# Patient Record
Sex: MALE | Race: BLACK OR AFRICAN AMERICAN | Employment: UNEMPLOYED | URBAN - METROPOLITAN AREA
[De-identification: names, ages, dates, MRNs, and addresses within clinical notes are randomized per-mention and may not be internally consistent; named-entity substitution may affect disease eponyms.]

---

## 2017-01-31 ENCOUNTER — TELEPHONE (OUTPATIENT)
Dept: FAMILY MEDICINE CLINIC | Age: 27
End: 2017-01-31

## 2017-01-31 NOTE — TELEPHONE ENCOUNTER
----- Message from [employer] sent at 1/31/2017  2:25 PM EST -----  Regarding: FAM Thacker/telephone  Pt's caregiver, Pinky Villar from [address] [place] [address] [phone].  Would like to know if Mr Marci Kwon and and his other client  patient  Mr Danae Bullard who is scheduled for his CPE on 2/20/17 can come in together for their CPE, like they have done in the past

## 2017-02-22 ENCOUNTER — OFFICE VISIT (OUTPATIENT)
Dept: FAMILY MEDICINE CLINIC | Age: 27
End: 2017-02-22

## 2017-02-22 VITALS
RESPIRATION RATE: 12 BRPM | WEIGHT: 286 LBS | HEIGHT: 71 IN | BODY MASS INDEX: 40.04 KG/M2 | TEMPERATURE: 97.6 F | HEART RATE: 54 BPM | DIASTOLIC BLOOD PRESSURE: 56 MMHG | SYSTOLIC BLOOD PRESSURE: 97 MMHG

## 2017-02-22 DIAGNOSIS — Z11.1 SCREENING-PULMONARY TB: ICD-10-CM

## 2017-02-22 DIAGNOSIS — E78.5 HYPERLIPIDEMIA, UNSPECIFIED HYPERLIPIDEMIA TYPE: ICD-10-CM

## 2017-02-22 DIAGNOSIS — E66.9 OBESITY (BMI 30-39.9): ICD-10-CM

## 2017-02-22 DIAGNOSIS — R73.02 IGT (IMPAIRED GLUCOSE TOLERANCE): ICD-10-CM

## 2017-02-22 DIAGNOSIS — I95.2 HYPOTENSION DUE TO DRUGS: ICD-10-CM

## 2017-02-22 DIAGNOSIS — Z00.00 ROUTINE GENERAL MEDICAL EXAMINATION AT A HEALTH CARE FACILITY: Primary | ICD-10-CM

## 2017-02-22 LAB — HBA1C MFR BLD HPLC: 5.2 %

## 2017-02-22 NOTE — PATIENT INSTRUCTIONS

## 2017-02-22 NOTE — PROGRESS NOTES
1. Have you been to the ER, urgent care clinic since your last visit? Hospitalized since your last visit? No    2. Have you seen or consulted any other health care providers outside of the [address] [place] [address] since your last visit? Include any pap smears or colon screening.  No     Chief Complaint   Patient presents with    Complete Physical

## 2017-02-22 NOTE — MR AVS SNAPSHOT
Visit Information Date & Time Provider Department Dept. Phone Encounter #  
 2/22/2017  2:00 PM Karen Lewis  [facility] [phone] [id number] Follow-up Instructions Return in about 6 months (around 8/22/2017) for routine labs. Upcoming Health Maintenance Date Due DTaP/Tdap/Td series (2 - Td) 9/3/2023 Allergies as of 2/22/2017  Review Complete On: 2/22/2017 By: Karen Lewis NP No Known Allergies Current Immunizations  Reviewed on 9/24/2014 Name Date Influenza Vaccine 12/3/2013 Influenza Vaccine (Quad) PF 10/7/2016 Influenza Vaccine Split 10/16/2012 TB Skin Test (PPD) Intradermal  Incomplete, 12/11/2015, 9/22/2014 Tdap 9/3/2013 Not reviewed this visit You Were Diagnosed With   
  
 Codes Comments Routine general medical examination at a health care facility    -  Primary ICD-10-CM: Z00.00 ICD-9-CM: V70.0 IGT (impaired glucose tolerance)     ICD-10-CM: R73.02 
ICD-9-CM: 790.22 Obesity (BMI 30-39. 9)     ICD-10-CM: E66.9 ICD-9-CM: 278.00 Hyperlipidemia, unspecified hyperlipidemia type     ICD-10-CM: E78.5 ICD-9-CM: 272.4 Screening-pulmonary TB     ICD-10-CM: Z11.1 ICD-9-CM: V74.1 Vitals BP  
  
  
  
  
  
 97/56 (BP 1 Location: Right arm, BP Patient Position: Sitting) Vitals History BMI and BSA Data Body Mass Index Body Surface Area  
 39.89 kg/m 2 2.55 m 2 [facility] [phone] Your Updated Medication List  
  
   
This list is accurate as of: 2/22/17  2:26 PM.  Always use your most recent med list.  
  
  
  
  
 aspirin delayed-release 81 mg tablet TAKE ONE TABLET BY MOUTH DAILY. cloNIDine HCl 0.2 mg tablet Commonly known as:  CATAPRES  
TAKE ONE TABLET BY MOUTH TWICE DAILY. divalproex  mg tablet Commonly known as:  DEPAKOTE Take  by mouth three (3) times daily. glucose blood VI test strips strip Commonly known as:  ACCU-CHEK ACTIVE TEST For glucose testing once daily. Dx: E11.9  
  
 hydrOXYzine pamoate 50 mg capsule Commonly known as:  VISTARIL Take 25 mg by mouth three (3) times daily. lancets 30 gauge Misc Commonly known as:  EASY TOUCH TWIST LANCETS For glucose testing once daily. Dx: E11.9 LORazepam 1 mg tablet Commonly known as:  ATIVAN Take 1 mg by mouth three (3) times daily. metFORMIN 500 mg tablet Commonly known as:  GLUCOPHAGE Take 1 Tab by mouth daily (with breakfast). RisperDAL 4 mg tablet Generic drug:  risperiDONE Take  by mouth. simvastatin 20 mg tablet Commonly known as:  ZOCOR  
TAKE 1 TABLET EACH EVENING FOR CHOLESTEROL. topiramate 100 mg tablet Commonly known as:  TOPAMAX Take  by mouth two (2) times a day. traZODone 100 mg tablet Commonly known as:  Cornelious Haymaker Take 100 mg by mouth nightly. ziprasidone 80 mg capsule Commonly known as:  Link Heir Take 80 mg by mouth two (2) times daily (with meals). We Performed the Following AMB POC HEMOGLOBIN A1C [46353 CPT(R)] AMB POC TUBERCULOSIS, INTRADERMAL (SKIN TEST) [35498 CPT(R)] Follow-up Instructions Return in about 6 months (around 8/22/2017) for routine labs. Patient Instructions Well Visit, Ages 25 to 48: Care Instructions Your Care Instructions Physical exams can help you stay healthy. Your doctor has checked your overall health and may have suggested ways to take good care of yourself. He or she also may have recommended tests. At home, you can help prevent illness with healthy eating, regular exercise, and other steps. Follow-up care is a key part of your treatment and safety.  Be sure to make and go to all appointments, and call your doctor if you are having problems. It's also a good idea to know your test results and keep a list of the medicines you take. How can you care for yourself at home? · Reach and stay at a healthy weight. This will lower your risk for many problems, such as obesity, diabetes, heart disease, and high blood pressure. · Get at least 30 minutes of physical activity on most days of the week. Walking is a good choice. You also may want to do other activities, such as running, swimming, cycling, or playing tennis or team sports. Discuss any changes in your exercise program with your doctor. · Do not smoke or allow others to smoke around you. If you need help quitting, talk to your doctor about stop-smoking programs and medicines. These can increase your chances of quitting for good. · Talk to your doctor about whether you have any risk factors for sexually transmitted infections (STIs). Having one sex partner (who does not have STIs and does not have sex with anyone else) is a good way to avoid these infections. · Use birth control if you do not want to have children at this time. Talk with your doctor about the choices available and what might be best for you. · Protect your skin from too much sun. When you're outdoors from 10 a.m. to 4 p.m., stay in the shade or cover up with clothing and a hat with a wide brim. Wear sunglasses that block UV rays. Even when it's cloudy, put broad-spectrum sunscreen (SPF 30 or higher) on any exposed skin. · See a dentist one or two times a year for checkups and to have your teeth cleaned. · Wear a seat belt in the car. · Drink alcohol in moderation, if at all. That means no more than 2 drinks a day for men and 1 drink a day for women. Follow your doctor's advice about when to have certain tests. These tests can spot problems early. For everyone · Cholesterol. Have the fat (cholesterol) in your blood tested after age 21.  Your doctor will tell you how often to have this done based on your age, family history, or other things that can increase your risk for heart disease. · Blood pressure. Have your blood pressure checked during a routine doctor visit. Your doctor will tell you how often to check your blood pressure based on your age, your blood pressure results, and other factors. · Vision. Talk with your doctor about how often to have a glaucoma test. 
· Diabetes. Ask your doctor whether you should have tests for diabetes. · Colon cancer. Have a test for colon cancer at age 48. You may have one of several tests. If you are younger than 48, you may need a test earlier if you have any risk factors. Risk factors include whether you already had a precancerous polyp removed from your colon or whether your parent, brother, sister, or child has had colon cancer. For women · Breast exam and mammogram. Talk to your doctor about when you should have a clinical breast exam and a mammogram. Medical experts differ on whether and how often women under 50 should have these tests. Your doctor can help you decide what is right for you. · Pap test and pelvic exam. Begin Pap tests at age 24. A Pap test is the best way to find cervical cancer. The test often is part of a pelvic exam. Ask how often to have this test. 
· Tests for sexually transmitted infections (STIs). Ask whether you should have tests for STIs. You may be at risk if you have sex with more than one person, especially if your partners do not wear condoms. For men · Tests for sexually transmitted infections (STIs). Ask whether you should have tests for STIs. You may be at risk if you have sex with more than one person, especially if you do not wear a condom. · Testicular cancer exam. Ask your doctor whether you should check your testicles regularly. · Prostate exam. Talk to your doctor about whether you should have a blood test (called a PSA test) for prostate cancer.  Experts differ on whether and when men should have this test. Some experts suggest it if you are older than 39 and are -American or have a father or brother who got prostate cancer when he was younger than 72. When should you call for help? Watch closely for changes in your health, and be sure to contact your doctor if you have any problems or symptoms that concern you. Where can you learn more? Go to http://virginia-rylan.info/. Enter P072 in the search box to learn more about \"Well Visit, Ages 25 to 48: Care Instructions. \" Current as of: July 19, 2016 Content Version: 11.1 © 6302-7459 [employer]. Care instructions adapted under license by [employer] (which disclaims liability or warranty for this information). If you have questions about a medical condition or this instruction, always ask your healthcare professional. [address] any warranty or liability for your use of this information. Introducing [facility]! [employer] introduces MoboTap patient portal. Now you can access parts of your medical record, email your doctor's office, and request medication refills online. 1. In your internet browser, go to [email or web address] 2. Click on the First Time User? Click Here link in the Sign In box. You will see the New Member Sign Up page. 3. Enter your MoboTap Access Code exactly as it appears below. You will not need to use this code after youve completed the sign-up process. If you do not sign up before the expiration date, you must request a new code. · MoboTap Access Code: [email or web address] Expires: 5/23/2017  2:26 PM 
 
4. Enter the last four digits of your Social Security Number (xxxx) and Date of Birth (mm/dd/yyyy) as indicated and click Submit. You will be taken to the next sign-up page. 5. Create a MoboTap ID.  This will be your MoboTap login ID and cannot be changed, so think of one that is secure and easy to remember. 6. Create a [employer] password. You can change your password at any time. 7. Enter your Password Reset Question and Answer. This can be used at a later time if you forget your password. 8. Enter your e-mail address. You will receive e-mail notification when new information is available in [address] 9. Click Sign Up. You can now view and download portions of your medical record. 10. Click the Download Summary menu link to download a portable copy of your medical information. If you have questions, please visit the Frequently Asked Questions section of the [employer] website. Remember, [employer] is NOT to be used for urgent needs. For medical emergencies, dial 911. Now available from your iPhone and Android! Please provide this summary of care documentation to your next provider. Your primary care clinician is listed as Gavino Andrade. If you have any questions after today's visit, please call [phone].

## 2017-02-22 NOTE — PROGRESS NOTES
HISTORY OF PRESENT ILLNESS  Margaux Ojeda is a 31 y.o. male. HPI  Cardiovascular Review:  The patient has pre-diabetes and hypertension. Diet and Lifestyle: generally follows a low fat low cholesterol diet, generally follows a low sodium diet, exercises sporadically, nonsmoker  Home BP Monitoring: is not measured at home. Pertinent ROS: taking medications as instructed, no medication side effects noted, no TIA's, no chest pain on exertion, no dyspnea on exertion, no swelling of ankles. Review of records indicates patient has lost 60 lbs over last 3 years. Diabetes Mellitus:  He has pre-diabetes. Patient taking Metformin BID. Diabetic ROS - medication compliance: compliant all of the time, diabetic diet compliance: noncompliant some of the time, home glucose monitoring: is performed sporadically, nonfasting values range . Psychiatry Review:  Patient is treated for bipolar disorder, MRAlberta Current treatment includes Geodon, Topamax, Ativan, Trazodone, Hydroxyzine, Clonidine and Depakote. Patient seen routinely by psychiatry, Dr Adriano Godoy. Ongoing symptoms include: none. Patient denies: psychomotor agitation, feelings of losing control, behavior disturbances. Reported side effects from the treatment: weight gain, fatigue. Current Outpatient Prescriptions   Medication Sig Dispense Refill    aspirin delayed-release 81 mg tablet TAKE ONE TABLET BY MOUTH DAILY. 30 Tab PRN    cloNIDine HCl (CATAPRES) 0.2 mg tablet TAKE ONE TABLET BY MOUTH TWICE DAILY. 60 Tab 5    simvastatin (ZOCOR) 20 mg tablet TAKE 1 TABLET EACH EVENING FOR CHOLESTEROL. 30 Tab 11    divalproex DR (DEPAKOTE) 500 mg tablet Take  by mouth three (3) times daily.  traZODone (DESYREL) 100 mg tablet Take 100 mg by mouth nightly.  lancets (EASY TOUCH TWIST LANCETS) 30 gauge misc For glucose testing once daily.  Dx: E11.9 100 Package 3    glucose blood VI test strips (ACCU-CHEK ACTIVE TEST) strip For glucose testing once daily. Dx: E11.9 50 Strip 11    risperiDONE (RISPERDAL) 4 mg tablet Take  by mouth.  LORazepam (ATIVAN) 1 mg tablet Take 1 mg by mouth three (3) times daily.  hydrOXYzine (VISTARIL) 50 mg capsule Take 25 mg by mouth three (3) times daily.  topiramate (TOPAMAX) 100 mg tablet Take  by mouth two (2) times a day.  ziprasidone (GEODON) 80 mg capsule Take 80 mg by mouth two (2) times daily (with meals). Past Medical History:   Diagnosis Date    Hyperlipidemia     Mental retardation     Obesity (BMI 30-39. 9)     Type II or unspecified type diabetes mellitus without mention of complication, not stated as uncontrolled       Lab Results   Component Value Date/Time    Hemoglobin A1c 5.5 10/07/2016 11:06 AM    Hemoglobin A1c 5.6 06/10/2016 11:23 AM    Hemoglobin A1c 5.4 12/11/2015 10:53 AM    Glucose 82 10/07/2016 11:06 AM    Microalb/Creat ratio (ug/mg creat.) 1.3 06/03/2013 11:48 AM    LDL, calculated 64 06/10/2016 11:23 AM    Creatinine 0.86 10/07/2016 11:06 AM      Lab Results   Component Value Date/Time    Cholesterol, total 119 06/10/2016 11:23 AM    HDL Cholesterol 42 06/10/2016 11:23 AM    LDL, calculated 64 06/10/2016 11:23 AM    Triglyceride 64 06/10/2016 11:23 AM      Review of Systems   Unable to perform ROS: mental acuity       Physical Exam   Constitutional: He appears well-developed and well-nourished. No distress. HENT:   Right Ear: Tympanic membrane and ear canal normal.   Left Ear: Tympanic membrane and ear canal normal.   Nose: No mucosal edema. Right sinus exhibits no maxillary sinus tenderness and no frontal sinus tenderness. Left sinus exhibits no maxillary sinus tenderness and no frontal sinus tenderness. Mouth/Throat: Oropharynx is clear and moist.   Eyes: Conjunctivae and EOM are normal. Pupils are equal, round, and reactive to light. Neck: Normal range of motion. Neck supple. No JVD present. Carotid bruit is not present. No thyromegaly present. Cardiovascular: Normal rate, regular rhythm, normal heart sounds and intact distal pulses. Exam reveals no gallop and no friction rub. No murmur heard. Pulmonary/Chest: Effort normal and breath sounds normal. No respiratory distress. He has no decreased breath sounds. He has no wheezes. He has no rhonchi. Abdominal: Normal appearance and bowel sounds are normal. There is no tenderness. There is no rigidity and no guarding. Hernia confirmed negative in the right inguinal area and confirmed negative in the left inguinal area. Genitourinary: Testes normal and penis normal.   Musculoskeletal: He exhibits no edema. Lymphadenopathy:     He has no cervical adenopathy. Neurological: He is alert. He has normal strength. No cranial nerve deficit. Gait normal.   Skin: No rash noted. Psychiatric: He has a normal mood and affect. His behavior is normal.   Nursing note and vitals reviewed. ASSESSMENT and PLAN  Luis was seen today for complete physical.    Diagnoses and all orders for this visit:    Routine general medical examination at a health care facility    IGT (impaired glucose tolerance)  AMB POC HEMOGLOBIN A1C: 5.2%. Stop Metformin. Will monitor. Hypotension due to drugs  Advised caretaker to discuss Clonidine with psychiatry. Obesity (BMI 30-39. 9)  Discussed need for weight loss through diet and exercise. Reviewed decreased caloric intake and increased activity. Hyperlipidemia, unspecified hyperlipidemia type  Total and LDL cholesterol to goal, HDL >40. No medication changes. Screening-pulmonary TB  -     AMB POC TUBERCULOSIS, INTRADERMAL (SKIN TEST)        I have discussed the diagnosis with the patient and the intended plan as seen in the above orders. The patient has received an after-visit summary along with patient information handout. I have discussed medication side effects and warnings with the patient as well.     Follow-up Disposition:  Return in about 6 months (around 8/22/2017) for routine labs.

## 2017-02-24 LAB
MM INDURATION POC: 0 MM (ref 0–5)
PPD POC: NEGATIVE NEGATIVE

## 2017-02-27 RX ORDER — CLONIDINE HYDROCHLORIDE 0.2 MG/1
TABLET ORAL
Qty: 60 TAB | Refills: 5 | OUTPATIENT
Start: 2017-02-27

## 2017-03-21 RX ORDER — SIMVASTATIN 20 MG/1
TABLET, FILM COATED ORAL
Qty: 30 TAB | Refills: 5 | Status: SHIPPED | OUTPATIENT
Start: 2017-03-21 | End: 2017-11-14 | Stop reason: SDUPTHER

## 2017-03-30 NOTE — TELEPHONE ENCOUNTER
Abby Sawant called and wanted to confirm how often [facility] be [facility] stopped. I informed him that per the order it says once daily but that I would confirm that with FAM Thacker and call back. He voiced understanding.

## 2017-03-31 DIAGNOSIS — E11.9 DIABETES MELLITUS TYPE II, CONTROLLED (HCC): ICD-10-CM

## 2017-04-02 RX ORDER — CALCIUM CITRATE/VITAMIN D3 200MG-6.25
TABLET ORAL
Qty: 50 STRIP | Refills: 5 | Status: ON HOLD | OUTPATIENT
Start: 2017-04-02 | End: 2020-09-04

## 2017-04-03 NOTE — TELEPHONE ENCOUNTER
[id number] [facility] notified patient need to check blood sugar 3 times per week     Per Wanita Leventhal needs new rx for lancets and test strips with 3 times a week

## 2017-04-05 RX ORDER — BLOOD-GLUCOSE CONTROL, NORMAL
EACH MISCELLANEOUS
Qty: 100 PACKAGE | Refills: 3 | Status: ON HOLD | OUTPATIENT
Start: 2017-04-05 | End: 2020-09-04

## 2017-11-14 ENCOUNTER — OFFICE VISIT (OUTPATIENT)
Dept: FAMILY MEDICINE CLINIC | Age: 27
End: 2017-11-14

## 2017-11-14 VITALS
TEMPERATURE: 97.4 F | RESPIRATION RATE: 16 BRPM | OXYGEN SATURATION: 99 % | HEART RATE: 62 BPM | HEIGHT: 71 IN | WEIGHT: 268.2 LBS | SYSTOLIC BLOOD PRESSURE: 108 MMHG | BODY MASS INDEX: 37.55 KG/M2 | DIASTOLIC BLOOD PRESSURE: 60 MMHG

## 2017-11-14 DIAGNOSIS — F79 MENTAL RETARDATION: ICD-10-CM

## 2017-11-14 DIAGNOSIS — R73.02 IGT (IMPAIRED GLUCOSE TOLERANCE): ICD-10-CM

## 2017-11-14 DIAGNOSIS — Z23 ENCOUNTER FOR IMMUNIZATION: ICD-10-CM

## 2017-11-14 DIAGNOSIS — E78.5 HYPERLIPIDEMIA, UNSPECIFIED HYPERLIPIDEMIA TYPE: ICD-10-CM

## 2017-11-14 DIAGNOSIS — E66.9 OBESITY (BMI 30-39.9): Primary | ICD-10-CM

## 2017-11-14 RX ORDER — LISINOPRIL 5 MG/1
TABLET ORAL
COMMUNITY
Start: 2017-10-17 | End: 2017-11-14 | Stop reason: ALTCHOICE

## 2017-11-14 RX ORDER — CLONIDINE HYDROCHLORIDE 0.1 MG/1
TABLET ORAL
COMMUNITY
Start: 2017-10-17 | End: 2019-11-15 | Stop reason: SDUPTHER

## 2017-11-14 NOTE — PROGRESS NOTES
Chief Complaint   Patient presents with    Cholesterol Problem     follow up     1. Have you been to the ER, urgent care clinic since your last visit? Hospitalized since your last visit? No    2. Have you seen or consulted any other health care providers outside of the [facility] since your last visit? Include any pap smears or colon screening. No       Received Verbal order per NP Imelda Crawford to administer Flu vaccine. Vaccine was given in Right Deltoid. Patient tolerated injection well with no adverse reactions while here in the office.

## 2017-11-14 NOTE — PROGRESS NOTES
Christiana Allen is a 28 y.o. male who was seen in clinic today (11/14/2017). Subjective:  Cardiovascular Review:  The patient has pre-diabetes and hypertension. Diet and Lifestyle: generally follows a low fat low cholesterol diet, generally follows a low sodium diet, exercises sporadically, nonsmoker  Home BP Monitoring: is not measured at home. Pertinent ROS: taking medications as instructed, no medication side effects noted, no TIA's, no chest pain on exertion, no dyspnea on exertion, no swelling of ankles. Review of records indicates patient has lost 75 lbs over last 4 years with diet changes. Diabetes Mellitus:  Last A1c: 5.2% (2/2017) Metformin and Lisinopril have been discontinued due to improved A1c. Home glucose monitoring: is performed sporadically, nonfasting values range . Psychiatry Review:  Patient is treated for bipolar disorder, MR. Current treatment includes Geodon, Topamax, Ativan, Trazodone, Hydroxyzine, Clonidine and Depakote. Patient seen routinely by psychiatry, Dr Karthik Wilson. Ongoing symptoms include: none. Patient denies: psychomotor agitation, feelings of losing control, behavior disturbances. Reported side effects from the treatment: weight gain, fatigue. Prior to Admission medications    Medication Sig Start Date End Date Taking? Authorizing Provider   cloNIDine HCl (CATAPRES) 0.1 mg tablet  10/17/17  Yes Historical Provider   glucose blood VI test strips (ACCU-CHEK ACTIVE TEST) strip For glucose testing 3 times a week . Dx: E11.9 4/5/17  Yes Dipti Solano NP   lancets (EASY TOUCH TWIST LANCETS) 30 gauge misc For glucose testing 3 times a week Dx: E11.9 4/5/17  Yes Dipti Solano NP   TRUE METRIX GLUCOSE TEST STRIP strip USE TO CHECK SUGAR DAILY. 4/2/17  Yes Dipti Solano NP   simvastatin (ZOCOR) 20 mg tablet TAKE 1 TABLET EACH EVENING FOR CHOLESTEROL.  3/21/17  Yes Dipti Solano NP   aspirin delayed-release 81 mg tablet TAKE ONE TABLET BY MOUTH DAILY. 9/29/16  Yes Maxim Coughlin, FAM   divalproex DR (DEPAKOTE) 500 mg tablet Take  by mouth three (3) times daily. Yes Historical Provider   traZODone (DESYREL) 100 mg tablet Take 100 mg by mouth nightly. Yes Historical Provider   risperiDONE (RISPERDAL) 4 mg tablet Take  by mouth. Yes Historical Provider   LORazepam (ATIVAN) 1 mg tablet Take 1 mg by mouth three (3) times daily. Yes Historical Provider   hydrOXYzine (VISTARIL) 50 mg capsule Take 25 mg by mouth three (3) times daily. Yes Historical Provider   topiramate (TOPAMAX) 100 mg tablet Take  by mouth two (2) times a day. Yes Historical Provider   ziprasidone (GEODON) 80 mg capsule Take 80 mg by mouth two (2) times daily (with meals). Yes Historical Provider          No Known Allergies        ROS  See HPI    Objective:   Physical Exam   Constitutional: He appears well-developed and well-nourished. Neck: Normal range of motion. Neck supple. No JVD present. Carotid bruit is not present. No thyromegaly present. Cardiovascular: Normal rate, regular rhythm and intact distal pulses. Exam reveals no gallop and no friction rub. No murmur heard. Pulmonary/Chest: Effort normal and breath sounds normal. No respiratory distress. Musculoskeletal: He exhibits no edema. Lymphadenopathy:     He has no cervical adenopathy. Neurological: He is alert. Psychiatric: He has a normal mood and affect. His behavior is normal.   Nursing note and vitals reviewed. Visit Vitals    /60 (BP 1 Location: Right arm, BP Patient Position: Sitting)    Pulse 62    Temp 97.4 °F (36.3 °C) (Oral)    Resp 16    Ht 5' 11\" (1.803 m)    Wt 268 lb 3.2 oz (121.7 kg)    SpO2 99%    BMI 37.41 kg/m2       Assessment & Plan:  Diagnoses and all orders for this visit:    1. Obesity (BMI 30-39. 9)  Encouraged continued weight loss through diet and exercise. Reviewed decreased caloric intake and increased activity.   -     CBC W/O DIFF  - TSH AND FREE T4    2. Hyperlipidemia, unspecified hyperlipidemia type  Total and LDL cholesterol to goal, HDL >40. No medication changes.  -     METABOLIC PANEL, COMPREHENSIVE  -     LIPID PANEL    3. IGT (impaired glucose tolerance)  No longer diabetic but will continue to monitor A1c.   -     HEMOGLOBIN A1C W/O EAG    4. Mental retardation  Stable, no changes to current therapy    5. Encounter for immunization  -     Influenza virus vaccine (QUADRIVALENT PRES FREE SYRINGE) IM ([id number])  -     NH IMMUNIZ ADMIN,1 SINGLE/COMB VAC/TOXOID      I have discussed the diagnosis with the patient and the intended plan as seen in the above orders. The patient has received an after-visit summary along with patient information handout. I have discussed medication side effects and warnings with the patient as well. Follow-up Disposition:  Return in about 6 months (around 5/14/2018) for disease management.         Lora Burkitt, NP

## 2017-11-15 LAB
ALBUMIN SERPL-MCNC: 4.1 G/DL (ref 3.5–5.5)
ALBUMIN/GLOB SERPL: 1.4 {RATIO} (ref 1.2–2.2)
ALP SERPL-CCNC: 48 IU/L (ref 39–117)
ALT SERPL-CCNC: 19 IU/L (ref 0–44)
AST SERPL-CCNC: 15 IU/L (ref 0–40)
BILIRUB SERPL-MCNC: 0.5 MG/DL (ref 0–1.2)
BUN SERPL-MCNC: 9 MG/DL (ref 6–20)
BUN/CREAT SERPL: 10 (ref 9–20)
CALCIUM SERPL-MCNC: 9.2 MG/DL (ref 8.7–10.2)
CHLORIDE SERPL-SCNC: 105 MMOL/L (ref 96–106)
CHOLEST SERPL-MCNC: 131 MG/DL (ref 100–199)
CO2 SERPL-SCNC: 24 MMOL/L (ref 18–29)
CREAT SERPL-MCNC: 0.92 MG/DL (ref 0.76–1.27)
ERYTHROCYTE [DISTWIDTH] IN BLOOD BY AUTOMATED COUNT: 14.5 % (ref 12.3–15.4)
GFR SERPLBLD CREATININE-BSD FMLA CKD-EPI: 113 ML/MIN/1.73
GFR SERPLBLD CREATININE-BSD FMLA CKD-EPI: 130 ML/MIN/1.73
GLOBULIN SER CALC-MCNC: 3 G/DL (ref 1.5–4.5)
GLUCOSE SERPL-MCNC: 79 MG/DL (ref 65–99)
HBA1C MFR BLD: 5.2 % (ref 4.8–5.6)
HCT VFR BLD AUTO: 39.3 % (ref 37.5–51)
HDLC SERPL-MCNC: 44 MG/DL
HGB BLD-MCNC: 13.2 G/DL (ref 12.6–17.7)
INTERPRETATION, 910389: NORMAL
LDLC SERPL CALC-MCNC: 74 MG/DL (ref 0–99)
MCH RBC QN AUTO: 28.6 PG (ref 26.6–33)
MCHC RBC AUTO-ENTMCNC: 33.6 G/DL (ref 31.5–35.7)
MCV RBC AUTO: 85 FL (ref 79–97)
PLATELET # BLD AUTO: 175 X10E3/UL (ref 150–379)
POTASSIUM SERPL-SCNC: 4.1 MMOL/L (ref 3.5–5.2)
PROT SERPL-MCNC: 7.1 G/DL (ref 6–8.5)
RBC # BLD AUTO: 4.61 X10E6/UL (ref 4.14–5.8)
SODIUM SERPL-SCNC: 142 MMOL/L (ref 134–144)
T4 FREE SERPL-MCNC: 1.42 NG/DL (ref 0.82–1.77)
TRIGL SERPL-MCNC: 64 MG/DL (ref 0–149)
TSH SERPL DL<=0.005 MIU/L-ACNC: 0.54 UIU/ML (ref 0.45–4.5)
VLDLC SERPL CALC-MCNC: 13 MG/DL (ref 5–40)
WBC # BLD AUTO: 4 X10E3/UL (ref 3.4–10.8)

## 2018-01-15 ENCOUNTER — TELEPHONE (OUTPATIENT)
Dept: FAMILY MEDICINE CLINIC | Age: 28
End: 2018-01-15

## 2018-05-15 ENCOUNTER — OFFICE VISIT (OUTPATIENT)
Dept: FAMILY MEDICINE CLINIC | Age: 28
End: 2018-05-15

## 2018-05-15 VITALS
BODY MASS INDEX: 36.4 KG/M2 | OXYGEN SATURATION: 100 % | WEIGHT: 260 LBS | HEIGHT: 71 IN | DIASTOLIC BLOOD PRESSURE: 72 MMHG | RESPIRATION RATE: 18 BRPM | SYSTOLIC BLOOD PRESSURE: 113 MMHG | TEMPERATURE: 97.3 F | HEART RATE: 53 BPM

## 2018-05-15 DIAGNOSIS — E78.5 HYPERLIPIDEMIA, UNSPECIFIED HYPERLIPIDEMIA TYPE: ICD-10-CM

## 2018-05-15 DIAGNOSIS — E66.9 OBESITY (BMI 30-39.9): Primary | ICD-10-CM

## 2018-05-15 DIAGNOSIS — F99 PSYCHIATRIC DIAGNOSIS: ICD-10-CM

## 2018-05-15 DIAGNOSIS — R73.02 IGT (IMPAIRED GLUCOSE TOLERANCE): ICD-10-CM

## 2018-05-15 PROBLEM — E66.01 SEVERE OBESITY (BMI 35.0-39.9) WITH COMORBIDITY (HCC): Status: ACTIVE | Noted: 2018-05-15

## 2018-05-15 RX ORDER — DIVALPROEX SODIUM 500 MG/1
500 TABLET, EXTENDED RELEASE ORAL 2 TIMES DAILY
Status: ON HOLD | COMMUNITY
Start: 2018-04-17 | End: 2020-09-04

## 2018-05-15 RX ORDER — HYDROXYZINE PAMOATE 25 MG/1
CAPSULE ORAL
Status: ON HOLD | COMMUNITY
Start: 2018-04-17 | End: 2020-09-04

## 2018-05-15 NOTE — MR AVS SNAPSHOT
[address] Christina Bragg 13 
[phone] Patient: Moses Logan MRN: [id number] LZE:[id number] Visit Information Date & Time Provider Department Dept. Phone Encounter #  
 5/15/2018 10:45 AM Constance Jesus  [facility] [phone] [id number] Upcoming Health Maintenance Date Due Influenza Age 5 to Adult 8/1/2018 DTaP/Tdap/Td series (2 - Td) 9/3/2023 Allergies as of 5/15/2018  Review Complete On: 5/15/2018 By: Sindi Ring LPN No Known Allergies Current Immunizations  Reviewed on 9/24/2014 Name Date Influenza Vaccine 12/3/2013 Influenza Vaccine (Quad) PF 11/14/2017, 10/7/2016 Influenza Vaccine Split 10/16/2012 TB Skin Test (PPD) Intradermal 2/22/2017, 12/11/2015, 9/22/2014 Tdap 9/3/2013 Not reviewed this visit You Were Diagnosed With   
  
 Codes Comments Obesity (BMI 30-39.9)    -  Primary ICD-10-CM: E39.7 ICD-9-CM: 278.00 Hyperlipidemia, unspecified hyperlipidemia type     ICD-10-CM: E78.5 ICD-9-CM: 272.4 Psychiatric diagnosis     ICD-10-CM: F99 
ICD-9-CM: 300.9 IGT (impaired glucose tolerance)     ICD-10-CM: R73.02 
ICD-9-CM: 790.22 Vitals BP Pulse Temp Resp Height(growth percentile) Weight(growth percentile) 113/72 (BP 1 Location: Right arm, BP Patient Position: Sitting) (!) 53 97.3 °F (36.3 °C) (Oral) 18 5' 11\" (1.803 m) 260 lb (117.9 kg) SpO2 BMI Smoking Status 100% 36.26 kg/m2 Never Smoker Vitals History BMI and BSA Data Body Mass Index Body Surface Area  
 36.26 kg/m 2 2.43 m 2 Preferred Pharmacy Pharmacy Name Phone [phone] Your Updated Medication List  
  
   
This list is accurate as of 5/15/18 11:27 AM.  Always use your most recent med list.  
  
  
  
  
 aspirin delayed-release 81 mg tablet TAKE ONE TABLET BY MOUTH DAILY. cloNIDine HCl 0.1 mg tablet Commonly known as:  CATAPRES  
  
 divalproex  mg ER tablet Commonly known as:  DEPAKOTE ER Take 500 mg by mouth two (2) times a day.  
  
 hydrOXYzine pamoate 25 mg capsule Commonly known as:  VISTARIL  
  
 lancets 30 gauge Misc Commonly known as:  EASY TOUCH TWIST LANCETS For glucose testing 3 times a week Dx: E11.9 LORazepam 1 mg tablet Commonly known as:  ATIVAN Take 1 mg by mouth three (3) times daily. RisperDAL 4 mg tablet Generic drug:  risperiDONE Take  by mouth. simvastatin 20 mg tablet Commonly known as:  ZOCOR  
TAKE 1 TABLET BY MOUTH AT BEDTIME FOR CHOLESTEROL. topiramate 100 mg tablet Commonly known as:  TOPAMAX Take  by mouth two (2) times a day. traZODone 100 mg tablet Commonly known as:  [place] Take 100 mg by mouth nightly. * TRUE METRIX GLUCOSE TEST STRIP strip Generic drug:  glucose blood VI test strips USE TO CHECK SUGAR DAILY. * glucose blood VI test strips strip Commonly known as:  ACCU-CHEK ACTIVE TEST For glucose testing 3 times a week . Dx: E11.9  
  
 ziprasidone 80 mg capsule Commonly known as:  Nel Batsheva Take 80 mg by mouth two (2) times daily (with meals). * Notice: This list has 2 medication(s) that are the same as other medications prescribed for you. Read the directions carefully, and ask your doctor or other care provider to review them with you. We Performed the Following CBC W/O DIFF [92150 CPT(R)] HEMOGLOBIN A1C W/O EAG [15150 CPT(R)] LIPID PANEL [93376 CPT(R)] METABOLIC PANEL, COMPREHENSIVE [92079 CPT(R)] TSH AND FREE T4 [98823 CPT(R)] Introducing [facility] & HEALTH SERVICES! [facility] introduces ServiceTitan patient portal. Now you can access parts of your medical record, email your doctor's office, and request medication refills online. 1. In your internet browser, go to [email or web address] 2. Click on the First Time User? Click Here link in the Sign In box. You will see the New Member Sign Up page. 3. Enter your Dental Kidz Access Code exactly as it appears below. You will not need to use this code after youve completed the sign-up process. If you do not sign up before the expiration date, you must request a new code. · Dental Kidz Access Code: [email or web address] Expires: 8/13/2018 11:27 AM 
 
4. Enter the last four digits of your Social Security Number (xxxx) and Date of Birth (mm/dd/yyyy) as indicated and click Submit. You will be taken to the next sign-up page. 5. Create a GiftMet ID. This will be your Dental Kidz login ID and cannot be changed, so think of one that is secure and easy to remember. 6. Create a Dental Kidz password. You can change your password at any time. 7. Enter your Password Reset Question and Answer. This can be used at a later time if you forget your password. 8. Enter your e-mail address. You will receive e-mail notification when new information is available in [address] 9. Click Sign Up. You can now view and download portions of your medical record. 10. Click the Download Summary menu link to download a portable copy of your medical information. If you have questions, please visit the Frequently Asked Questions section of the Dental Kidz website. Remember, Dental Kidz is NOT to be used for urgent needs. For medical emergencies, dial 911. Now available from your iPhone and Android! Please provide this summary of care documentation to your next provider. Your primary care clinician is listed as Tee Licona. If you have any questions after today's visit, please call [phone].

## 2018-05-15 NOTE — PROGRESS NOTES
Chief Complaint   Patient presents with    Follow-up     6 month follw up for disease mangement     1. Have you been to the ER, urgent care clinic since your last visit? Hospitalized since your last visit? No    2. Have you seen or consulted any other health care providers outside of the [address] [place] [address] since your last visit? Include any pap smears or colon screening.  No

## 2018-05-15 NOTE — PROGRESS NOTES
[facility] Note    Sejal Kaye is a 33 y.o. male who was seen in clinic today (5/15/2018). Subjective:  Cardiovascular Review:  The patient has hypertension and obesity. Diet and Lifestyle: generally follows a low fat low cholesterol diet, generally follows a low sodium diet, exercises sporadically, nonsmoker  Home BP Monitoring: is not measured at home. Pertinent ROS: taking medications as instructed, no medication side effects noted, no TIA's, no chest pain on exertion, no dyspnea on exertion, no swelling of ankles. Review of records indicates patient has lost 75 lbs over last 4 years with diet changes. Last A1c: 5.2% (11/2017). Diabetic medication have been discontinued. Psychiatry Review:  Patient is treated for bipolar disorder, MR. Patient seen routinely by psychiatry, Dr Vickey Gannon. Prior to Admission medications    Medication Sig Start Date End Date Taking? Authorizing Provider   divalproex ER (DEPAKOTE ER) 500 mg ER tablet Take 500 mg by mouth two (2) times a day. 4/17/18  Yes Historical Provider   hydrOXYzine pamoate (VISTARIL) 25 mg capsule  4/17/18  Yes Historical Provider   cloNIDine HCl (CATAPRES) 0.1 mg tablet  10/17/17  Yes Historical Provider   aspirin delayed-release 81 mg tablet TAKE ONE TABLET BY MOUTH DAILY. 11/14/17  Yes Choco Cotter NP   simvastatin (ZOCOR) 20 mg tablet TAKE 1 TABLET BY MOUTH AT BEDTIME FOR CHOLESTEROL. 11/14/17  Yes Choco Cotter NP   glucose blood VI test strips (ACCU-CHEK ACTIVE TEST) strip For glucose testing 3 times a week . Dx: E11.9 4/5/17  Yes Choco Cotter NP   lancets (EASY TOUCH TWIST LANCETS) 30 gauge misc For glucose testing 3 times a week Dx: E11.9 4/5/17  Yes Choco Cotter NP   TRUE METRIX GLUCOSE TEST STRIP strip USE TO CHECK SUGAR DAILY. 4/2/17  Yes Choco Cotter NP   traZODone (DESYREL) 100 mg tablet Take 100 mg by mouth nightly.    Yes Historical Provider   risperiDONE (RISPERDAL) 4 mg tablet Take  by mouth. Yes Historical Provider   LORazepam (ATIVAN) 1 mg tablet Take 1 mg by mouth three (3) times daily. Yes Historical Provider   topiramate (TOPAMAX) 100 mg tablet Take  by mouth two (2) times a day. Yes Historical Provider   ziprasidone (GEODON) 80 mg capsule Take 80 mg by mouth two (2) times daily (with meals). Yes Historical Provider          No Known Allergies        ROS  See HPI    Objective:   Physical Exam   Constitutional: He appears well-developed and well-nourished. Neck: Normal range of motion. Neck supple. No JVD present. Carotid bruit is not present. No thyromegaly present. Cardiovascular: Normal rate, regular rhythm and intact distal pulses. Exam reveals no gallop and no friction rub. No murmur heard. Pulmonary/Chest: Effort normal and breath sounds normal. No respiratory distress. Musculoskeletal: He exhibits no edema. Lymphadenopathy:     He has no cervical adenopathy. Neurological: He is alert. Psychiatric: He has a normal mood and affect. His behavior is normal.   Nursing note and vitals reviewed. Visit Vitals    /72 (BP 1 Location: Right arm, BP Patient Position: Sitting)    Pulse (!) 53    Temp 97.3 °F (36.3 °C) (Oral)    Resp 18    Ht 5' 11\" (1.803 m)    Wt 260 lb (117.9 kg)    SpO2 100%    BMI 36.26 kg/m2       Assessment & Plan:  Diagnoses and all orders for this visit:    1. Obesity (BMI 30-39. 9)  Encouraged continued weight loss through diet and exercise. Reviewed decreased caloric intake and increased activity.  -     TSH AND FREE T4    2. Hyperlipidemia, unspecified hyperlipidemia type  Total and LDL cholesterol to goal, HDL >40. No medication changes. -     CBC W/O DIFF  -     METABOLIC PANEL, COMPREHENSIVE  -     LIPID PANEL    3. Psychiatric diagnosis  Stable, managed by psychiatry. No changes to current therapy    4.  IGT (impaired glucose tolerance)  -     HEMOGLOBIN A1C W/O EAG      I have discussed the diagnosis with the patient and the intended plan as seen in the above orders. The patient has received an after-visit summary along with patient information handout. I have discussed medication side effects and warnings with the patient as well. Follow-up Disposition:  Return in about 6 months (around 11/15/2018).         Pascual Means NP

## 2018-05-16 LAB
ALBUMIN SERPL-MCNC: 4.2 G/DL (ref 3.5–5.5)
ALBUMIN/GLOB SERPL: 1.4 {RATIO} (ref 1.2–2.2)
ALP SERPL-CCNC: 49 IU/L (ref 39–117)
ALT SERPL-CCNC: 15 IU/L (ref 0–44)
AST SERPL-CCNC: 15 IU/L (ref 0–40)
BILIRUB SERPL-MCNC: 0.5 MG/DL (ref 0–1.2)
BUN SERPL-MCNC: 11 MG/DL (ref 6–20)
BUN/CREAT SERPL: 13 (ref 9–20)
CALCIUM SERPL-MCNC: 9.1 MG/DL (ref 8.7–10.2)
CHLORIDE SERPL-SCNC: 102 MMOL/L (ref 96–106)
CHOLEST SERPL-MCNC: 130 MG/DL (ref 100–199)
CO2 SERPL-SCNC: 24 MMOL/L (ref 18–29)
CREAT SERPL-MCNC: 0.87 MG/DL (ref 0.76–1.27)
ERYTHROCYTE [DISTWIDTH] IN BLOOD BY AUTOMATED COUNT: 13.6 % (ref 12.3–15.4)
GFR SERPLBLD CREATININE-BSD FMLA CKD-EPI: 117 ML/MIN/1.73
GFR SERPLBLD CREATININE-BSD FMLA CKD-EPI: 135 ML/MIN/1.73
GLOBULIN SER CALC-MCNC: 3 G/DL (ref 1.5–4.5)
GLUCOSE SERPL-MCNC: 73 MG/DL (ref 65–99)
HBA1C MFR BLD: 4.9 % (ref 4.8–5.6)
HCT VFR BLD AUTO: 42.3 % (ref 37.5–51)
HDLC SERPL-MCNC: 39 MG/DL
HGB BLD-MCNC: 13.8 G/DL (ref 13–17.7)
INTERPRETATION, 910389: NORMAL
LDLC SERPL CALC-MCNC: 80 MG/DL (ref 0–99)
MCH RBC QN AUTO: 28.3 PG (ref 26.6–33)
MCHC RBC AUTO-ENTMCNC: 32.6 G/DL (ref 31.5–35.7)
MCV RBC AUTO: 87 FL (ref 79–97)
PLATELET # BLD AUTO: 215 X10E3/UL (ref 150–379)
POTASSIUM SERPL-SCNC: 4.4 MMOL/L (ref 3.5–5.2)
PROT SERPL-MCNC: 7.2 G/DL (ref 6–8.5)
RBC # BLD AUTO: 4.87 X10E6/UL (ref 4.14–5.8)
SODIUM SERPL-SCNC: 142 MMOL/L (ref 134–144)
T4 FREE SERPL-MCNC: 1.44 NG/DL (ref 0.82–1.77)
TRIGL SERPL-MCNC: 56 MG/DL (ref 0–149)
TSH SERPL DL<=0.005 MIU/L-ACNC: 0.59 UIU/ML (ref 0.45–4.5)
VLDLC SERPL CALC-MCNC: 11 MG/DL (ref 5–40)
WBC # BLD AUTO: 3.5 X10E3/UL (ref 3.4–10.8)

## 2019-01-14 NOTE — MR AVS SNAPSHOT
Called and spoke to Meron at [facility] and she informed me that a new prescription was needed for the patients diagnostic strips. I could not give a verbal due to medicare requirements. Informed I would send to another provider for approval.    Visit Information Date & Time Provider Department Dept. Phone Encounter #  
 11/14/2017 10:00 AM Governor Alvarado  [facility] [phone] [id number] Follow-up Instructions Return in about 6 months (around 5/14/2018) for disease management. Upcoming Health Maintenance Date Due Influenza Age 5 to Adult 8/1/2017 DTaP/Tdap/Td series (2 - Td) 9/3/2023 Allergies as of 11/14/2017  Review Complete On: 11/14/2017 By: Governor Alvarado NP No Known Allergies Current Immunizations  Reviewed on 9/24/2014 Name Date Influenza Vaccine 12/3/2013 Influenza Vaccine (Quad) PF  Incomplete, 10/7/2016 Influenza Vaccine Split 10/16/2012 TB Skin Test (PPD) Intradermal 2/22/2017, 12/11/2015, 9/22/2014 Tdap 9/3/2013 Not reviewed this visit You Were Diagnosed With   
  
 Codes Comments Obesity (BMI 30-39.9)    -  Primary ICD-10-CM: M35.4 ICD-9-CM: 278.00 Hyperlipidemia, unspecified hyperlipidemia type     ICD-10-CM: E78.5 ICD-9-CM: 272.4 IGT (impaired glucose tolerance)     ICD-10-CM: R73.02 
ICD-9-CM: 790.22 Mental retardation     ICD-10-CM: F79 
ICD-9-CM: 270 Encounter for immunization     ICD-10-CM: M45 ICD-9-CM: V03.89 Vitals BP Pulse Temp Resp Height(growth percentile) Weight(growth percentile) 108/60 (BP 1 Location: Right arm, BP Patient Position: Sitting) 62 97.4 °F (36.3 °C) (Oral) 16 5' 11\" (1.803 m) 268 lb 3.2 oz (121.7 kg) SpO2 BMI Smoking Status 99% 37.41 kg/m2 Never Smoker Vitals History BMI and BSA Data Body Mass Index Body Surface Area  
 37.41 kg/m 2 2.47 m 2 Preferred Pharmacy Pharmacy Name Phone [facility] [phone] Your Updated Medication List  
  
   
This list is accurate as of: 11/14/17 10:46 AM.  Always use your most recent med list.  
  
  
  
  
 aspirin delayed-release 81 mg tablet TAKE ONE TABLET BY MOUTH DAILY. cloNIDine HCl 0.1 mg tablet Commonly known as:  CATAPRES  
  
 divalproex  mg tablet Commonly known as:  DEPAKOTE Take  by mouth three (3) times daily. hydrOXYzine pamoate 50 mg capsule Commonly known as:  VISTARIL Take 25 mg by mouth three (3) times daily. lancets 30 gauge Misc Commonly known as:  EASY TOUCH TWIST LANCETS For glucose testing 3 times a week Dx: E11.9  
  
 lisinopril 5 mg tablet Commonly known as:  PRINIVIL, ZESTRIL  
  
 LORazepam 1 mg tablet Commonly known as:  ATIVAN Take 1 mg by mouth three (3) times daily. RisperDAL 4 mg tablet Generic drug:  risperiDONE Take  by mouth. simvastatin 20 mg tablet Commonly known as:  ZOCOR  
TAKE 1 TABLET EACH EVENING FOR CHOLESTEROL. topiramate 100 mg tablet Commonly known as:  TOPAMAX Take  by mouth two (2) times a day. traZODone 100 mg tablet Commonly known as:  [place] Take 100 mg by mouth nightly. * TRUE METRIX GLUCOSE TEST STRIP strip Generic drug:  glucose blood VI test strips USE TO CHECK SUGAR DAILY. * glucose blood VI test strips strip Commonly known as:  ACCU-CHEK ACTIVE TEST For glucose testing 3 times a week . Dx: E11.9  
  
 ziprasidone 80 mg capsule Commonly known as:  Ismael Torres Take 80 mg by mouth two (2) times daily (with meals). * Notice: This list has 2 medication(s) that are the same as other medications prescribed for you. Read the directions carefully, and ask your doctor or other care provider to review them with you. We Performed the Following CBC W/O DIFF [25005 CPT(R)] HEMOGLOBIN A1C W/O EAG [93483 CPT(R)] INFLUENZA VIRUS VAC QUAD,SPLIT,PRESV FREE SYRINGE IM [id number] CPT(R)] LIPID PANEL [22418 CPT(R)] METABOLIC PANEL, COMPREHENSIVE [15046 CPT(R)] AL IMMUNIZ ADMIN,1 SINGLE/COMB VAC/TOXOID [id number] CPT(R)] TSH AND FREE T4 [26974 CPT(R)] Follow-up Instructions Return in about 6 months (around 5/14/2018) for disease management. Patient Instructions Prediabetes: Care Instructions Your Care Instructions Prediabetes is a warning sign that you are at risk for getting type 2 diabetes. It means that your blood sugar is higher than it should be. The food you eat turns into sugar, which your body uses for energy. Normally, an organ called the pancreas makes insulin, which allows the sugar in your blood to get into your body's cells. But when your body can't use insulin the right way, the sugar doesn't move into cells. It stays in your blood instead. This is called insulin resistance. The buildup of sugar in the blood causes prediabetes. The good news is that lifestyle changes may help you get your blood sugar back to normal and help you avoid or delay diabetes. Follow-up care is a key part of your treatment and safety. Be sure to make and go to all appointments, and call your doctor if you are having problems. It's also a good idea to know your test results and keep a list of the medicines you take. How can you care for yourself at home? · Watch your weight. A healthy weight helps your body use insulin properly. · Limit the amount of calories, sweets, and unhealthy fat you eat. Ask your doctor if you should see a dietitian. A registered dietitian can help you create meal plans that fit your lifestyle. · Get at least 30 minutes of exercise on most days of the week. Exercise helps control your blood sugar. It also helps you maintain a healthy weight. Walking is a good choice. You also may want to do other activities, such as running, swimming, cycling, or playing tennis or team sports. · Do not smoke. Smoking can make prediabetes worse. If you need help quitting, talk to your doctor about stop-smoking programs and medicines. These can increase your chances of quitting for good. · If your doctor prescribed medicines, take them exactly as prescribed. Call your doctor if you think you are having a problem with your medicine. You will get more details on the specific medicines your doctor prescribes. When should you call for help? Watch closely for changes in your health, and be sure to contact your doctor if: 
? · You have any symptoms of diabetes. These may include: ¨ Being thirsty more often. ¨ Urinating more. ¨ Being hungrier. ¨ Losing weight. ¨ Being very tired. ¨ Having blurry vision. ? · You have a wound that will not heal.  
? · You have an infection that will not go away. ? · You have problems with your blood pressure. ? · You want more information about diabetes and how you can keep from getting it. Where can you learn more? Go to http://virginia-rylan.info/. Enter I222 in the search box to learn more about \"Prediabetes: Care Instructions. \" Current as of: March 13, 2017 Content Version: 11.4 © 9369-3709 [employer]. Care instructions adapted under license by [employer] (which disclaims liability or warranty for this information). If you have questions about a medical condition or this instruction, always ask your healthcare professional. [address] any warranty or liability for your use of this information. Introducing [facility] & HEALTH [facility]! Yonny Van introduces "[employer]" patient portal. Now you can access parts of your medical record, email your doctor's office, and request medication refills online. 1. In your internet browser, go to [email or web address] 2. Click on the First Time User? Click Here link in the Sign In box. You will see the New Member Sign Up page. 3. Enter your "[employer]" Access Code exactly as it appears below. You will not need to use this code after youve completed the sign-up process.  If you do not sign up before the expiration date, you must request a new code. · Pins Access Code: [email or web address] Expires: 2/12/2018 10:46 AM 
 
4. Enter the last four digits of your Social Security Number (xxxx) and Date of Birth (mm/dd/yyyy) as indicated and click Submit. You will be taken to the next sign-up page. 5. Create a Pins ID. This will be your Pins login ID and cannot be changed, so think of one that is secure and easy to remember. 6. Create a Pins password. You can change your password at any time. 7. Enter your Password Reset Question and Answer. This can be used at a later time if you forget your password. 8. Enter your e-mail address. You will receive e-mail notification when new information is available in [address] 9. Click Sign Up. You can now view and download portions of your medical record. 10. Click the Download Summary menu link to download a portable copy of your medical information. If you have questions, please visit the Frequently Asked Questions section of the Pins website. Remember, Pins is NOT to be used for urgent needs. For medical emergencies, dial 911. Now available from your iPhone and Android! Please provide this summary of care documentation to your next provider. Your primary care clinician is listed as Maxim Coughlin. If you have any questions after today's visit, please call [phone].

## 2019-03-12 RX ORDER — ASPIRIN 81 MG/1
TABLET ORAL
Qty: 30 TAB | Refills: 5 | Status: SHIPPED | OUTPATIENT
Start: 2019-03-12 | End: 2019-11-15 | Stop reason: SDUPTHER

## 2019-03-12 NOTE — TELEPHONE ENCOUNTER
Pharmacy requesting medication refill     Requested Prescriptions     Pending Prescriptions Disp Refills    aspirin delayed-release 81 mg tablet 30 Tab 11     Pharmacy on file verified  [facility] [phone])    [address] [place] [address]  Tuesday, May 15, 2018 10:45 AM  Follow-up Disposition:  Return in about 6 months (around 11/15/2018).

## 2019-05-24 ENCOUNTER — HOSPITAL ENCOUNTER (EMERGENCY)
Age: 29
Discharge: OTHER INSTITUTION | End: 2019-05-25
Attending: EMERGENCY MEDICINE | Admitting: EMERGENCY MEDICINE
Payer: MEDICAID

## 2019-05-24 DIAGNOSIS — F79 INTELLECTUAL DISABILITY: ICD-10-CM

## 2019-05-24 DIAGNOSIS — R46.89 AGGRESSIVE BEHAVIOR: ICD-10-CM

## 2019-05-24 DIAGNOSIS — F39 MOOD DISORDER (HCC): Primary | ICD-10-CM

## 2019-05-24 LAB
AMPHET UR QL SCN: NEGATIVE
BARBITURATES UR QL SCN: NEGATIVE
BENZODIAZ UR QL: NEGATIVE
CANNABINOIDS UR QL SCN: NEGATIVE
COCAINE UR QL SCN: NEGATIVE
DRUG SCRN COMMENT,DRGCM: NORMAL
ETHANOL SERPL-MCNC: <10 MG/DL
METHADONE UR QL: NEGATIVE
OPIATES UR QL: NEGATIVE
PCP UR QL: NEGATIVE

## 2019-05-24 PROCEDURE — 74011250636 HC RX REV CODE- 250/636: Performed by: EMERGENCY MEDICINE

## 2019-05-24 PROCEDURE — 96372 THER/PROPH/DIAG INJ SC/IM: CPT

## 2019-05-24 PROCEDURE — 99283 EMERGENCY DEPT VISIT LOW MDM: CPT

## 2019-05-24 PROCEDURE — 80307 DRUG TEST PRSMV CHEM ANLYZR: CPT

## 2019-05-24 PROCEDURE — 36415 COLL VENOUS BLD VENIPUNCTURE: CPT

## 2019-05-24 RX ORDER — HALOPERIDOL 5 MG/ML
5 INJECTION INTRAMUSCULAR
Status: COMPLETED | OUTPATIENT
Start: 2019-05-24 | End: 2019-05-24

## 2019-05-24 RX ADMIN — HALOPERIDOL LACTATE 5 MG: 5 INJECTION, SOLUTION INTRAMUSCULAR at 22:44
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
## 2019-05-24 NOTE — ED NOTES
Pt is at his baseline. Pt has history of intellectual disability. Pt is presently cooperative, smiling and pleasant.

## 2019-05-25 VITALS
DIASTOLIC BLOOD PRESSURE: 88 MMHG | HEART RATE: 83 BPM | WEIGHT: 260 LBS | OXYGEN SATURATION: 98 % | SYSTOLIC BLOOD PRESSURE: 145 MMHG | RESPIRATION RATE: 20 BRPM | BODY MASS INDEX: 32.33 KG/M2 | TEMPERATURE: 97.6 F | HEIGHT: 75 IN
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
## 2019-05-25 NOTE — ED NOTES
Attempted to leave a message for legal [facility] [facility] unable to leave message voice mail not set up [phone]

## 2019-05-25 NOTE — ED NOTES
TRANSFER - OUT REPORT:    Verbal report given to Hemal RN(name) on [facility]  being transferred to [facility]) for routine progression of care       Report consisted of patients Situation, Background, Assessment and   Recommendations(SBAR). Information from the following report(s) SBAR, Kardex, [facility] and Recent Results was reviewed with the receiving nurse. Lines:       Opportunity for questions and clarification was provided.       Patient transported with:   bárbara, ronald, tdo, [place]

## 2019-11-21 ENCOUNTER — OFFICE VISIT (OUTPATIENT)
Dept: FAMILY MEDICINE CLINIC | Age: 29
End: 2019-11-21

## 2019-11-21 VITALS
OXYGEN SATURATION: 98 % | HEIGHT: 75 IN | WEIGHT: 269 LBS | TEMPERATURE: 97.8 F | HEART RATE: 77 BPM | BODY MASS INDEX: 33.45 KG/M2 | RESPIRATION RATE: 18 BRPM | SYSTOLIC BLOOD PRESSURE: 119 MMHG | DIASTOLIC BLOOD PRESSURE: 77 MMHG

## 2019-11-21 DIAGNOSIS — Z23 ENCOUNTER FOR IMMUNIZATION: ICD-10-CM

## 2019-11-21 DIAGNOSIS — E66.9 OBESITY (BMI 30-39.9): Primary | ICD-10-CM

## 2019-11-21 DIAGNOSIS — F99 PSYCHIATRIC DIAGNOSIS: ICD-10-CM

## 2019-11-21 DIAGNOSIS — R73.02 IGT (IMPAIRED GLUCOSE TOLERANCE): ICD-10-CM

## 2019-11-21 DIAGNOSIS — Z11.1 SCREENING-PULMONARY TB: ICD-10-CM

## 2019-11-21 DIAGNOSIS — F79 INTELLECTUAL DISABILITY: ICD-10-CM

## 2019-11-21 DIAGNOSIS — E78.2 MIXED HYPERLIPIDEMIA: ICD-10-CM

## 2019-11-21 DIAGNOSIS — I10 ESSENTIAL HYPERTENSION: ICD-10-CM

## 2019-11-21 RX ORDER — QUETIAPINE FUMARATE 200 MG/1
200 TABLET, FILM COATED ORAL 3 TIMES DAILY
COMMUNITY

## 2019-11-21 RX ORDER — DIPHENHYDRAMINE HCL 50 MG
50 CAPSULE ORAL
COMMUNITY
End: 2020-09-14

## 2019-11-21 NOTE — PROGRESS NOTES
No chief complaint on file. Reviewed Record in preparation for visit and have obtained necessary documentation. Identified pt with two pt identifiers (Name @ )    Health Maintenance Due   Topic    Influenza Age 5 to Adult          1. Have you been to the ER, urgent care clinic since your last visit? Hospitalized since your last visit? No      2. Have you seen or consulted any other health care providers outside of the [address] [place] [address] since your last visit? Include any pap smears or colon screening.  no

## 2019-11-21 NOTE — PATIENT INSTRUCTIONS
Starting a Weight Loss Plan: Care Instructions Your Care Instructions If you are thinking about losing weight, it can be hard to know where to start. Your doctor can help you set up a weight loss plan that best meets your needs. You may want to take a class on nutrition or exercise, or join a weight loss support group. If you have questions about how to make changes to your eating or exercise habits, ask your doctor about seeing a registered dietitian or an exercise specialist. 
It can be a big challenge to lose weight. But you do not have to make huge changes at once. Make small changes, and stick with them. When those changes become habit, add a few more changes. If you do not think you are ready to make changes right now, try to pick a date in the future. Make an appointment to see your doctor to discuss whether the time is right for you to start a plan. Follow-up care is a key part of your treatment and safety. Be sure to make and go to all appointments, and call your doctor if you are having problems. It's also a good idea to know your test results and keep a list of the medicines you take. How can you care for yourself at home? · Set realistic goals. Many people expect to lose much more weight than is likely. A weight loss of 5% to 10% of your body weight may be enough to improve your health. · Get family and friends involved to provide support. Talk to them about why you are trying to lose weight, and ask them to help. They can help by participating in exercise and having meals with you, even if they may be eating something different. · Find what works best for you. If you do not have time or do not like to cook, a program that offers meal replacement bars or shakes may be better for you. Or if you like to prepare meals, finding a plan that includes daily menus and recipes may be best. 
· Ask your doctor about other health professionals who can help you achieve your weight loss goals. ? A dietitian can help you make healthy changes in your diet. ? An exercise specialist or  can help you develop a safe and effective exercise program. 
? A counselor or psychiatrist can help you cope with issues such as depression, anxiety, or family problems that can make it hard to focus on weight loss. · Consider joining a support group for people who are trying to lose weight. Your doctor can suggest groups in your area. Where can you learn more? Go to http://virginia-rylan.info/. Enter D992 in the search box to learn more about \"Starting a Weight Loss Plan: Care Instructions. \" Current as of: March 28, 2019 Content Version: 12.2 © 8932-1116 Immun[employer]. Care instructions adapted under license by [employer] (which disclaims liability or warranty for this information). If you have questions about a medical condition or this instruction, always ask your healthcare professional. [place] [address] any warranty or liability for your use of this information. Vaccine Information Statement Influenza (Flu) Vaccine (Inactivated or Recombinant): What You Need to Know Many Vaccine Information Statements are available in [place] and other languages. See www.immunize.org/vis Hojas de información sobre vacunas están disponibles en español y en muchos otros idiomas. Visite www.immunize.org/vis 1. Why get vaccinated? Influenza vaccine can prevent influenza (flu). Flu is a contagious disease that spreads around the [place] every year, usually between October and May. Anyone can get the flu, but it is more dangerous for some people. Infants and young children, people 72years of age and older, pregnant women, and people with certain health conditions or a weakened immune system are at greatest risk of flu complications.  
 
Pneumonia, bronchitis, sinus infections and ear infections are examples of flu-related complications. If you have a medical condition, such as heart disease, cancer or diabetes, flu can make it worse. Flu can cause fever and chills, sore throat, muscle aches, fatigue, cough, headache, and runny or stuffy nose. Some people may have vomiting and diarrhea, though this is more common in children than adults. Each year thousands of people in the [facility] die from flu, and many more are hospitalized. Flu vaccine prevents millions of illnesses and flu-related visits to the doctor each year. 2. Influenza vaccines CDC recommends everyone 10months of age and older get vaccinated every flu season. Children 6 months through 6years of age may need 2 doses during a single flu season. Everyone else needs only 1 dose each flu season. It takes about 2 weeks for protection to develop after vaccination. There are many flu viruses, and they are always changing. Each year a new flu vaccine is made to protect against three or four viruses that are likely to cause disease in the upcoming flu season. Even when the vaccine doesnt exactly match these viruses, it may still provide some protection. Influenza vaccine does not cause flu. Influenza vaccine may be given at the same time as other vaccines. 3. Talk with your health care provider Tell your vaccine provider if the person getting the vaccine: 
 Has had an allergic reaction after a previous dose of influenza vaccine, or has any severe, life-threatening allergies.  Has ever had Guillain-Barré Syndrome (also called GBS). In some cases, your health care provider may decide to postpone influenza vaccination to a future visit. People with minor illnesses, such as a cold, may be vaccinated. People who are moderately or severely ill should usually wait until they recover before getting influenza vaccine. Your health care provider can give you more information. 4. Risks of a reaction  Soreness, redness, and swelling where shot is given, fever, muscle aches, and headache can happen after influenza vaccine.  There may be a very small increased risk of Guillain-Barré Syndrome (GBS) after inactivated influenza vaccine (the flu shot). Suleman Kicks children who get the flu shot along with pneumococcal vaccine (PCV13), and/or DTaP vaccine at the same time might be slightly more likely to have a seizure caused by fever. Tell your health care provider if a child who is getting flu vaccine has ever had a seizure. People sometimes faint after medical procedures, including vaccination. Tell your provider if you feel dizzy or have vision changes or ringing in the ears. As with any medicine, there is a very remote chance of a vaccine causing a severe allergic reaction, other serious injury, or death. 5. What if there is a serious problem? An allergic reaction could occur after the vaccinated person leaves the clinic. If you see signs of a severe allergic reaction (hives, swelling of the face and throat, difficulty breathing, a fast heartbeat, dizziness, or weakness), call 9-1-1 and get the person to the nearest hospital. 
 
For other signs that concern you, call your health care provider. Adverse reactions should be reported to the Vaccine Adverse Event Reporting System (VAERS). Your health care provider will usually file this report, or you can do it yourself. Visit the VAERS website at www.vaers. hhs.gov or call 8-834.320.7213. VAERS is only for reporting reactions, and VAERS staff do not give medical advice. 6. The National Vaccine Injury Compensation Program 
 
The [facility] Vaccine Injury Compensation Program (VICP) is a federal program that was created to compensate people who may have been injured by certain vaccines.  Visit the VICP website at www.hrsa.gov/vaccinecompensation or call 1-249.438.6889 to learn about the program and about filing a claim. There is a time limit to file a claim for compensation. 7. How can I learn more?  Ask your health care provider.  Call your local or state health department.  Contact the Centers for Disease Control and Prevention (CDC): 
- Call 3-462.318.3367 (1-800-CDC-INFO) or 
- Visit CDCs influenza website at www.cdc.gov/flu Vaccine Information Statement (Interim) Inactivated Influenza Vaccine 8/15/2019 
42 TELLO Villanueva [id number]-[id number] Department of Health and [employer] Centers for Disease Control and Prevention Office Use Only

## 2019-11-21 NOTE — PROGRESS NOTES
[facility] Note    Julian Coleman is a 26 y.o. male who was seen in clinic today (11/21/2019). Subjective:  Cardiovascular Review:  The patient has hypertension and obesity. Diet and Lifestyle: generally follows a low fat low cholesterol diet, generally follows a low sodium diet, exercises sporadically, nonsmoker  Home BP Monitoring: is not measured at home. Pertinent ROS: taking medications as instructed, no medication side effects noted, no TIA's, no chest pain on exertion, no dyspnea on exertion, no swelling of ankles. Last Hemoglobin A1c: 4.9% (5/2018). Diabetic medication have been discontinued. Psychiatry Review:  Patient is treated for bipolar disorder, MR. Patient seen routinely by psychiatry, Dr Dar Paula. Prior to Admission medications    Medication Sig Start Date End Date Taking? Authorizing Provider   lactulose (CHRONULAC) 10 gram/15 mL solution Take  by mouth daily. Yes Provider, Historical   QUEtiapine (SEROQUEL) 200 mg tablet Take 200 mg by mouth two (2) times a day. Yes Provider, Historical   diphenhydrAMINE (BANOPHEN) 50 mg capsule Take 25 mg by mouth three (3) times daily as needed. Yes Provider, Historical   aspirin delayed-release 81 mg tablet TAKE 1 TABLET DAILY. 11/15/19  Yes Talbert, Duane Lundborg, NP   cloNIDine HCl (CATAPRES) 0.1 mg tablet TAKE 1 TABLET BY MOUTH TWICE A DAY 11/15/19  Yes Talbert, Duane Lundborg, NP   hydroCHLOROthiazide (HYDRODIURIL) 25 mg tablet TAKE 1/2 TO 1 TABLET BY MOUTH DAILY 11/15/19  Yes Talbert, Duane Lundborg, NP   potassium chloride SR (KLOR-CON 10) 10 mEq tablet TAKE 1 TABLET BY MOUTH DAILY 11/15/19  Yes Talbert, Duane Lundborg, NP   divalproex ER (DEPAKOTE ER) 500 mg ER tablet Take 500 mg by mouth two (2) times a day. 4/17/18  Yes Provider, Historical   risperiDONE (RISPERDAL) 4 mg tablet Take 2 mg by mouth. Yes Provider, Historical   LORazepam (ATIVAN) 1 mg tablet Take 1 mg by mouth three (3) times daily.      Yes Provider, Historical   topiramate (TOPAMAX) 100 mg tablet Take  by mouth two (2) times a day. Yes Provider, Historical   simvastatin (ZOCOR) 20 mg tablet TAKE 1 TABLET BY MOUTH AT BEDTIME FOR CHOLESTEROL. 12/11/18   Rebecca Thompson NP   hydrOXYzine pamoate (VISTARIL) 25 mg capsule  4/17/18   Provider, Historical   glucose blood VI test strips (ACCU-CHEK ACTIVE TEST) strip For glucose testing 3 times a week . Dx: E11.9 4/5/17   Rebecca Thompson NP   lancets (EASY TOUCH TWIST LANCETS) 30 gauge misc For glucose testing 3 times a week Dx: E11.9 4/5/17   Rebecca Thompson NP   TRUE METRIX GLUCOSE TEST STRIP strip USE TO CHECK SUGAR DAILY. 4/2/17   Rebecca Thompson NP   traZODone (DESYREL) 100 mg tablet Take 100 mg by mouth nightly. Provider, Historical   ziprasidone (GEODON) 80 mg capsule Take 80 mg by mouth two (2) times daily (with meals). Provider, Historical          No Known Allergies        Review of Systems   Unable to perform ROS: Mental acuity         Objective:   Physical Exam  Vitals signs and nursing note reviewed. Constitutional:       Appearance: He is well-developed. Neck:      Musculoskeletal: Normal range of motion and neck supple. Thyroid: No thyromegaly. Vascular: No carotid bruit or JVD. Cardiovascular:      Rate and Rhythm: Normal rate and regular rhythm. Heart sounds: No murmur. No friction rub. No gallop. Pulmonary:      Effort: Pulmonary effort is normal. No respiratory distress. Breath sounds: Normal breath sounds. Lymphadenopathy:      Cervical: No cervical adenopathy. Neurological:      Mental Status: He is alert and oriented to person, place, and time.    Psychiatric:         Behavior: Behavior normal.           Visit Vitals  /77 (BP 1 Location: Left arm, BP Patient Position: Sitting)   Pulse 77   Temp 97.8 °F (36.6 °C) (Oral)   Resp 18   Ht 6' 3\" (1.905 m)   Wt 269 lb (122 kg)   SpO2 98%   BMI 33.62 kg/m²       Assessment & Plan:  Diagnoses and all orders for this visit:    1. Obesity (BMI 30-39. 9)  Discussed need for weight loss through diet and exercise. Reviewed decreased caloric intake and increased activity.  -     TSH AND FREE T4    2. Essential hypertension  Well controlled, no medication changes. -     CBC W/O DIFF  -     METABOLIC PANEL, COMPREHENSIVE  -     TSH AND FREE T4    3. Mixed hyperlipidemia  Recheck labs - add statin as needed. -     LIPID PANEL    4. IGT (impaired glucose tolerance)  -     Recheck HEMOGLOBIN A1C W/O EAG    5. Intellectual disability  Managed by psychiatry. 6. Psychiatric diagnosis  Managed by psychiatry. 7. Screening-pulmonary TB  -     QUANTIFERON-TB GOLD PLUS    8. Encounter for immunization  -     INFLUENZA VIRUS VAC QUAD,SPLIT,PRESV FREE SYRINGE IM  -     NC IMMUNIZ ADMIN,1 SINGLE/COMB VAC/TOXOID      I have discussed the diagnosis with the patient and the intended plan as seen in the above orders. The patient has received an after-visit summary along with patient information handout. I have discussed medication side effects and warnings with the patient as well. Follow-up and Dispositions    · Return in about 6 months (around 5/21/2020) for disease management, weight management.            Emanuel Licea, FAM

## 2019-11-26 LAB
ALBUMIN SERPL-MCNC: 3.8 G/DL (ref 3.5–5.5)
ALBUMIN/GLOB SERPL: 1.2 {RATIO} (ref 1.2–2.2)
ALP SERPL-CCNC: 49 IU/L (ref 39–117)
ALT SERPL-CCNC: 22 IU/L (ref 0–44)
AST SERPL-CCNC: 41 IU/L (ref 0–40)
BILIRUB SERPL-MCNC: 0.3 MG/DL (ref 0–1.2)
BUN SERPL-MCNC: 14 MG/DL (ref 6–20)
BUN/CREAT SERPL: 16 (ref 9–20)
CALCIUM SERPL-MCNC: 8.9 MG/DL (ref 8.7–10.2)
CHLORIDE SERPL-SCNC: 107 MMOL/L (ref 96–106)
CHOLEST SERPL-MCNC: 157 MG/DL (ref 100–199)
CO2 SERPL-SCNC: 26 MMOL/L (ref 20–29)
CREAT SERPL-MCNC: 0.89 MG/DL (ref 0.76–1.27)
ERYTHROCYTE [DISTWIDTH] IN BLOOD BY AUTOMATED COUNT: 12.3 % (ref 12.3–15.4)
GAMMA INTERFERON BACKGROUND BLD IA-ACNC: 0.04 IU/ML
GLOBULIN SER CALC-MCNC: 3.1 G/DL (ref 1.5–4.5)
GLUCOSE SERPL-MCNC: 77 MG/DL (ref 65–99)
HBA1C MFR BLD: 4.4 % (ref 4.8–5.6)
HCT VFR BLD AUTO: 38.2 % (ref 37.5–51)
HDLC SERPL-MCNC: 44 MG/DL
HGB BLD-MCNC: 12.5 G/DL (ref 13–17.7)
INTERPRETATION, 910389: NORMAL
LDLC SERPL CALC-MCNC: 97 MG/DL (ref 0–99)
M TB IFN-G BLD-IMP: NEGATIVE
M TB IFN-G CD4+ BCKGRND COR BLD-ACNC: 0.05 IU/ML
MCH RBC QN AUTO: 31.6 PG (ref 26.6–33)
MCHC RBC AUTO-ENTMCNC: 32.7 G/DL (ref 31.5–35.7)
MCV RBC AUTO: 97 FL (ref 79–97)
MITOGEN IGNF BLD-ACNC: >10 IU/ML
PLATELET # BLD AUTO: 207 X10E3/UL (ref 150–450)
POTASSIUM SERPL-SCNC: 4.5 MMOL/L (ref 3.5–5.2)
PROT SERPL-MCNC: 6.9 G/DL (ref 6–8.5)
QUANTIFERON INCUBATION, QF1T: NORMAL
QUANTIFERON TB2 AG: 0.04 IU/ML
RBC # BLD AUTO: 3.96 X10E6/UL (ref 4.14–5.8)
SERVICE CMNT-IMP: NORMAL
SODIUM SERPL-SCNC: 145 MMOL/L (ref 134–144)
T4 FREE SERPL-MCNC: 1.03 NG/DL (ref 0.82–1.77)
TRIGL SERPL-MCNC: 79 MG/DL (ref 0–149)
TSH SERPL DL<=0.005 MIU/L-ACNC: 1.83 UIU/ML (ref 0.45–4.5)
VLDLC SERPL CALC-MCNC: 16 MG/DL (ref 5–40)
WBC # BLD AUTO: 4.2 X10E3/UL (ref 3.4–10.8)

## 2019-11-29 ENCOUNTER — TELEPHONE (OUTPATIENT)
Dept: FAMILY MEDICINE CLINIC | Age: 29
End: 2019-11-29

## 2019-11-29 NOTE — TELEPHONE ENCOUNTER
[phone] spoke to [facility] notified we have not received the fax and if she can refax it

## 2019-11-29 NOTE — TELEPHONE ENCOUNTER
----- Message from Zoilanatachaselvin Deanna sent at 11/29/2019 11:29 AM EST -----  Regarding: Kirstie/ Telephone  Contact: [phone]  Caller's first and last name: Mercy Reyna with [facility]  Reason for call: Needed to know if we received some faxes for this pt for incontinence supplies that were faxed 11/11/19.  Please verify   Callback required yes/no and why: yes   Best contact number(s): [phone]  Details to clarify the request: n/a

## 2019-12-03 RX ORDER — POTASSIUM CHLORIDE 750 MG/1
TABLET, FILM COATED, EXTENDED RELEASE ORAL
Qty: 30 TAB | Status: SHIPPED | OUTPATIENT
Start: 2019-12-03 | End: 2020-10-13 | Stop reason: SDUPTHER

## 2019-12-03 RX ORDER — HYDROCHLOROTHIAZIDE 25 MG/1
25 TABLET ORAL DAILY
Qty: 30 TAB | OUTPATIENT
Start: 2019-12-03

## 2019-12-03 RX ORDER — HYDROCHLOROTHIAZIDE 12.5 MG/1
12.5 TABLET ORAL DAILY
Qty: 90 TAB | Refills: 1 | Status: SHIPPED | OUTPATIENT
Start: 2019-12-03 | End: 2020-06-02

## 2019-12-05 ENCOUNTER — TELEPHONE (OUTPATIENT)
Dept: FAMILY MEDICINE CLINIC | Age: 29
End: 2019-12-05

## 2019-12-05 NOTE — TELEPHONE ENCOUNTER
----- Message from Faizan Jorge sent at 12/5/2019 12:25 PM EST -----  Regarding: FAM Thacker/telephone  Contact: [phone]  Caller's first and last name: [facility] with [facility]   Reason for call: Form Follow up   Callback required yes/no and why: Yes, rep is needing to check the status of the medical necessity form sent for pt's incontinence supplies on 11/29/19.   Best contact number(s):  [phone]  Details to clarify the request:

## 2020-01-07 RX ORDER — ASPIRIN 81 MG/1
TABLET ORAL
Qty: 31 TAB | Refills: 0 | OUTPATIENT
Start: 2020-01-07

## 2020-01-13 ENCOUNTER — TELEPHONE (OUTPATIENT)
Dept: FAMILY MEDICINE CLINIC | Age: 30
End: 2020-01-13

## 2020-01-21 ENCOUNTER — TELEPHONE (OUTPATIENT)
Dept: FAMILY MEDICINE CLINIC | Age: 30
End: 2020-01-21

## 2020-01-21 NOTE — TELEPHONE ENCOUNTER
----- Message from Emily Connor sent at 1/21/2020  2:30 PM EST -----  Regarding: FAM Ortiz / Telephone  General Message/Vendor Calls    Caller's first and last name: Marzena Fox with [employer]       Reason for call: fall risk protocol      Callback required yes/no and why:yes      Best contact number(s): [phone]      Details to clarify the request: Marzena Fox with [employer] requested the fall risk protocol faxed to [phone].       Emily Connor

## 2020-01-22 NOTE — TELEPHONE ENCOUNTER
Called and spoke to Raina العراقي. Patient has had no recent falls. Walks on own , at times gets little of balance. He states Director of facility needs something in writing regarding what to do if falls.

## 2020-01-28 ENCOUNTER — OFFICE VISIT (OUTPATIENT)
Dept: FAMILY MEDICINE CLINIC | Age: 30
End: 2020-01-28

## 2020-01-28 VITALS
SYSTOLIC BLOOD PRESSURE: 137 MMHG | HEIGHT: 75 IN | DIASTOLIC BLOOD PRESSURE: 75 MMHG | RESPIRATION RATE: 18 BRPM | WEIGHT: 291 LBS | BODY MASS INDEX: 36.18 KG/M2 | HEART RATE: 84 BPM | TEMPERATURE: 97.9 F | OXYGEN SATURATION: 99 %

## 2020-01-28 DIAGNOSIS — E66.01 SEVERE OBESITY (BMI 35.0-39.9) WITH COMORBIDITY (HCC): Primary | ICD-10-CM

## 2020-01-28 DIAGNOSIS — J06.9 VIRAL UPPER RESPIRATORY TRACT INFECTION: ICD-10-CM

## 2020-01-28 NOTE — PROGRESS NOTES
[facility] Note    Jenn Ribera is a 26 y.o. male who was seen in clinic today (1/28/2020). Subjective:  Cardiovascular Review:  The patient has hypertension and obesity. Diet and Lifestyle: generally follows a low fat low cholesterol diet, generally follows a low sodium diet, exercises sporadically, nonsmoker  Home BP Monitoring: is not measured at home. Pertinent ROS: taking medications as instructed, no medication side effects noted, no TIA's, no chest pain on exertion, no dyspnea on exertion, no swelling of ankles. Last Hemoglobin A1c: 4.4 (11/2019). Diabetic medications have been discontinued. Weight up to 291 lbs from 267 lbs. Caretaker reports patient is drinking a lot of juice. Psychiatry Review:  Patient is treated for bipolar disorder, MR. Patient seen routinely by psychiatry, Dr Maria Ines Arriaza. Upper Respiratory Infection  Patient complains of symptoms of a URI. Symptoms include congestion and cough. Onset of symptoms was 1 week ago, gradually improving since that time. He is drinking plenty of fluids. . Evaluation to date: none. Treatment to date: none. Prior to Admission medications    Medication Sig Start Date End Date Taking? Authorizing Provider   potassium chloride SR (KLOR-CON 10) 10 mEq tablet TAKE 1 TABLET BY MOUTH DAILY 12/3/19  Yes Chanda Thacker NP   hydroCHLOROthiazide (HYDRODIURIL) 12.5 mg tablet Take 1 Tab by mouth daily. For blood pressure 12/3/19  Yes Chanda Thacker NP   lactulose (CHRONULAC) 10 gram/15 mL solution Take  by mouth daily. Yes Provider, Historical   QUEtiapine (SEROQUEL) 200 mg tablet Take 200 mg by mouth two (2) times a day. Yes Provider, Historical   diphenhydrAMINE (BANOPHEN) 50 mg capsule Take 25 mg by mouth three (3) times daily as needed. Yes Provider, Historical   aspirin delayed-release 81 mg tablet TAKE 1 TABLET DAILY.  11/15/19  Yes Enriqueta Lucero NP   cloNIDine HCl (CATAPRES) 0.1 mg tablet TAKE 1 TABLET BY MOUTH TWICE A DAY 11/15/19  Yes Lyle Thacker NP   simvastatin (ZOCOR) 20 mg tablet TAKE 1 TABLET BY MOUTH AT BEDTIME FOR CHOLESTEROL. 12/11/18  Yes Lyle Thacker NP   divalproex ER (DEPAKOTE ER) 500 mg ER tablet Take 500 mg by mouth two (2) times a day. 4/17/18  Yes Provider, Historical   hydrOXYzine pamoate (VISTARIL) 25 mg capsule  4/17/18  Yes Provider, Historical   glucose blood VI test strips (ACCU-CHEK ACTIVE TEST) strip For glucose testing 3 times a week . Dx: E11.9 4/5/17  Yes Quinton Hinton NP   lancets (EASY TOUCH TWIST LANCETS) 30 gauge misc For glucose testing 3 times a week Dx: E11.9 4/5/17  Yes Lyle Thacker NP   TRUE METRIX GLUCOSE TEST STRIP strip USE TO CHECK SUGAR DAILY. 4/2/17  Yes Lyle Thacker NP   traZODone (DESYREL) 100 mg tablet Take 100 mg by mouth nightly. Yes Provider, Historical   risperiDONE (RISPERDAL) 4 mg tablet Take 2 mg by mouth. Yes Provider, Historical   LORazepam (ATIVAN) 1 mg tablet Take 1 mg by mouth three (3) times daily. Yes Provider, Historical   topiramate (TOPAMAX) 100 mg tablet Take  by mouth two (2) times a day. Yes Provider, Historical   ziprasidone (GEODON) 80 mg capsule Take 80 mg by mouth two (2) times daily (with meals). Yes Provider, Historical   hydroCHLOROthiazide (HYDRODIURIL) 25 mg tablet TAKE 1/2 TO 1 TABLET BY MOUTH DAILY 11/15/19   Quinton Hinton NP          No Known Allergies        ROS  See HPI    Objective:   Physical Exam  Vitals signs and nursing note reviewed. Constitutional:       General: He is not in acute distress. Appearance: He is well-developed. HENT:      Right Ear: Tympanic membrane and ear canal normal.      Left Ear: Tympanic membrane and ear canal normal.      Nose: Mucosal edema present. Right Sinus: No maxillary sinus tenderness or frontal sinus tenderness. Left Sinus: No maxillary sinus tenderness or frontal sinus tenderness.    Cardiovascular: Rate and Rhythm: Normal rate and regular rhythm. Heart sounds: Normal heart sounds. No murmur. Pulmonary:      Effort: Pulmonary effort is normal.      Breath sounds: Normal breath sounds. No decreased breath sounds, wheezing or rhonchi. Lymphadenopathy:      Cervical: No cervical adenopathy. Neurological:      Mental Status: He is alert and oriented to person, place, and time. Psychiatric:         Behavior: Behavior normal.           Visit Vitals  /75 (BP 1 Location: Left arm, BP Patient Position: Sitting)   Pulse 84   Temp 97.9 °F (36.6 °C) (Oral)   Resp 18   Ht 6' 3\" (1.905 m)   Wt 291 lb (132 kg)   SpO2 99%   BMI 36.37 kg/m²       Assessment & Plan:  Diagnoses and all orders for this visit:    1. Severe obesity (BMI 35.0-39. 9) with comorbidity ([address]  Discussed need for weight loss through diet and exercise. Reviewed decreased caloric intake and increased activity. 2. Viral upper respiratory tract infection  Discussed that symptoms were viral and recommended treating symptoms. Increase fluids and rest. Reviewed OTC products that patient could take. I have discussed the diagnosis with the patient and the intended plan as seen in the above orders. The patient has received an after-visit summary along with patient information handout. I have discussed medication side effects and warnings with the patient as well. Follow-up and Dispositions    · Return in about 6 months (around 7/28/2020) for weight management.            Arnulfo Meyer NP

## 2020-01-28 NOTE — PROGRESS NOTES
Anna Walsh is a 26 y.o. male      Chief Complaint   Patient presents with    Cold       1. Have you been to the ER, urgent care clinic since your last visit? Hospitalized since your last visit? No      2. Have you seen or consulted any other health care providers outside of the [address] [place] [address] since your last visit? Include any pap smears or colon screening.   No

## 2020-01-28 NOTE — PATIENT INSTRUCTIONS
Starting a Weight Loss Plan: Care Instructions Your Care Instructions If you are thinking about losing weight, it can be hard to know where to start. Your doctor can help you set up a weight loss plan that best meets your needs. You may want to take a class on nutrition or exercise, or join a weight loss support group. If you have questions about how to make changes to your eating or exercise habits, ask your doctor about seeing a registered dietitian or an exercise specialist. 
It can be a big challenge to lose weight. But you do not have to make huge changes at once. Make small changes, and stick with them. When those changes become habit, add a few more changes. If you do not think you are ready to make changes right now, try to pick a date in the future. Make an appointment to see your doctor to discuss whether the time is right for you to start a plan. Follow-up care is a key part of your treatment and safety. Be sure to make and go to all appointments, and call your doctor if you are having problems. It's also a good idea to know your test results and keep a list of the medicines you take. How can you care for yourself at home? · Set realistic goals. Many people expect to lose much more weight than is likely. A weight loss of 5% to 10% of your body weight may be enough to improve your health. · Get family and friends involved to provide support. Talk to them about why you are trying to lose weight, and ask them to help. They can help by participating in exercise and having meals with you, even if they may be eating something different. · Find what works best for you. If you do not have time or do not like to cook, a program that offers meal replacement bars or shakes may be better for you. Or if you like to prepare meals, finding a plan that includes daily menus and recipes may be best. 
· Ask your doctor about other health professionals who can help you achieve your weight loss goals. ? A dietitian can help you make healthy changes in your diet. ? An exercise specialist or  can help you develop a safe and effective exercise program. 
? A counselor or psychiatrist can help you cope with issues such as depression, anxiety, or family problems that can make it hard to focus on weight loss. · Consider joining a support group for people who are trying to lose weight. Your doctor can suggest groups in your area. Where can you learn more? Go to http://[place]-rylan.info/. Enter J922 in the search box to learn more about \"Starting a Weight Loss Plan: Care Instructions. \" Current as of: March 28, 2019 Content Version: 12.2 © 6110-3510 [employer]. Care instructions adapted under license by [employer] (which disclaims liability or warranty for this information). If you have questions about a medical condition or this instruction, always ask your healthcare professional. [address] any warranty or liability for your use of this information.

## 2020-01-28 NOTE — LETTER
NOTIFICATION RETURN TO WORK / SCHOOL 
 
1/28/2020 10:28 AM 
 
Mr. Robert Mckinney 
[address] [facility] [address] To Whom It May Concern: 
 
Robert Mckinney is currently under the care of EVAN Lawrence. He will return to work/school on: 1/28/20 If there are questions or concerns please have the patient contact our office. Sincerely, Raleigh Richardson NP

## 2020-04-23 ENCOUNTER — TELEPHONE (OUTPATIENT)
Dept: FAMILY MEDICINE CLINIC | Age: 30
End: 2020-04-23

## 2020-04-23 NOTE — TELEPHONE ENCOUNTER
----- Message from Marlene Jacobsen sent at 4/23/2020  3:35 PM EDT -----  Regarding: NP stephanie/larry  Contact: [phone]  Caller: Agustin Dupont ([employer])    Reason for call: Celso Donovan is faxing a seizure protocol form that he would like completed and returned.      Fax Number: [phone]

## 2020-04-24 NOTE — TELEPHONE ENCOUNTER
Call placed to [facility]. Patients name and  verified. Advised I have not received the fax provided with direct fax line.

## 2020-05-08 ENCOUNTER — DOCUMENTATION ONLY (OUTPATIENT)
Dept: FAMILY MEDICINE CLINIC | Age: 30
End: 2020-05-08

## 2020-05-08 NOTE — PROGRESS NOTES
Forms received from [address] [address] [facility]  for seizure protocol. Forms completed by provider and signed. Faxed to [phone]. Received confirmation.

## 2020-05-18 ENCOUNTER — TELEPHONE (OUTPATIENT)
Dept: FAMILY MEDICINE CLINIC | Age: 30
End: 2020-05-18

## 2020-05-18 NOTE — TELEPHONE ENCOUNTER
Call placed to Mr. Zunilda Soto. He stated that he is no longer the care giver for patient. Requested that I delete his information from his chart. Called [employer]. Was provided with number for Mr Dimple Solis [phone] called this number which went straight to voicemail and requested to enter password. Patients appt need to be rescheduled.

## 2020-05-26 ENCOUNTER — VIRTUAL VISIT (OUTPATIENT)
Dept: FAMILY MEDICINE CLINIC | Age: 30
End: 2020-05-26

## 2020-05-26 DIAGNOSIS — E66.01 SEVERE OBESITY (BMI 35.0-39.9) WITH COMORBIDITY (HCC): Primary | ICD-10-CM

## 2020-05-26 DIAGNOSIS — F79 INTELLECTUAL DISABILITY: ICD-10-CM

## 2020-05-26 NOTE — PROGRESS NOTES
Lucian Leiva is a 31 y.o. male who was seen by synchronous (real-time) audio-video technology on 5/26/2020. Consent: Lucian Leiva, who was seen by synchronous (real-time) audio-video technology, and/or his healthcare decision maker, is aware that this patient-initiated, Telehealth encounter on 5/26/2020 is a billable service, with coverage as determined by his insurance carrier. He is aware that he may receive a bill and has provided verbal consent to proceed: Yes. Assessment & Plan:   Diagnoses and all orders for this visit:    1. Severe obesity (BMI 35.0-39. 9) with comorbidity ([address]  Discussed need for weight loss through diet and exercise. Reviewed decreased caloric intake and increased activity. 2. Intellectual disability  Discussed that patient's psychiatric medications could be causing him to be hungry. Recommended he discuss theses symptoms with psychiatry. I spent at least 23 minutes on this visit with this established patient. [phone]  Subjective:   Lucian Leiva is a 31 y.o. male who was seen for No chief complaint on file. Cardiovascular Review:  The patient has hypertension and obesity. Diet and Lifestyle: generally follows a low fat low cholesterol diet, generally follows a low sodium diet, exercises sporadically, nonsmoker  Home BP Monitoring: is not measured at home. Pertinent ROS: taking medications as instructed, no medication side effects noted, no TIA's, no chest pain on exertion, no dyspnea on exertion, no swelling of ankles.      Last Hemoglobin A1c: 4.4 (11/2019). Diabetic medications have been discontinued.      Caretaker reports patient frequently requests to eat and has behavior issues if he is not given food. Weight up to 291 lbs from 267 lbs. .      Psychiatry Review:  Patient is treated for bipolar disorder, MR. Patient seen routinely by psychiatry, Dr Nj Landin. Prior to Admission medications    Medication Sig Start Date End Date Taking?  Authorizing Provider   potassium chloride SR (KLOR-CON 10) 10 mEq tablet TAKE 1 TABLET BY MOUTH DAILY 12/3/19   Kassandra Johnson NP   hydroCHLOROthiazide (HYDRODIURIL) 12.5 mg tablet Take 1 Tab by mouth daily. For blood pressure 12/3/19   Kassandra Johnson NP   lactulose (CHRONULAC) 10 gram/15 mL solution Take  by mouth daily. Provider, Historical   QUEtiapine (SEROQUEL) 200 mg tablet Take 200 mg by mouth two (2) times a day. Provider, Historical   diphenhydrAMINE (BANOPHEN) 50 mg capsule Take 25 mg by mouth three (3) times daily as needed. Provider, Historical   aspirin delayed-release 81 mg tablet TAKE 1 TABLET DAILY. 11/15/19   Kassandra Johnson NP   cloNIDine HCl (CATAPRES) 0.1 mg tablet TAKE 1 TABLET BY MOUTH TWICE A DAY 11/15/19   Kassandra Johnson NP   hydroCHLOROthiazide (HYDRODIURIL) 25 mg tablet TAKE 1/2 TO 1 TABLET BY MOUTH DAILY 11/15/19   Kassandra Johnson NP   simvastatin (ZOCOR) 20 mg tablet TAKE 1 TABLET BY MOUTH AT BEDTIME FOR CHOLESTEROL. 12/11/18   Kassandra Johnson NP   divalproex ER (DEPAKOTE ER) 500 mg ER tablet Take 500 mg by mouth two (2) times a day. 4/17/18   Provider, Historical   hydrOXYzine pamoate (VISTARIL) 25 mg capsule  4/17/18   Provider, Historical   glucose blood VI test strips (ACCU-CHEK ACTIVE TEST) strip For glucose testing 3 times a week . Dx: E11.9 4/5/17   Kassandra Johnson NP   lancets (EASY TOUCH TWIST LANCETS) 30 gauge misc For glucose testing 3 times a week Dx: E11.9 4/5/17   Kassandra Johnson NP   TRUE METRIX GLUCOSE TEST STRIP strip USE TO CHECK SUGAR DAILY. 4/2/17   Kassandra Johnson NP   traZODone (DESYREL) 100 mg tablet Take 100 mg by mouth nightly. Provider, Historical   risperiDONE (RISPERDAL) 4 mg tablet Take 2 mg by mouth. Provider, Historical   LORazepam (ATIVAN) 1 mg tablet Take 1 mg by mouth three (3) times daily. Provider, Historical   topiramate (TOPAMAX) 100 mg tablet Take  by mouth two (2) times a day.       Provider, Historical ziprasidone (GEODON) 80 mg capsule Take 80 mg by mouth two (2) times daily (with meals). Provider, Historical     No Known Allergies    Past Medical History:   Diagnosis Date    Hyperlipidemia     Mental retardation     Obesity (BMI 30-39. 9)     Type II or unspecified type diabetes mellitus without mention of complication, not stated as uncontrolled      No past surgical history on file. ROS  See HPI    Objective: There were no vitals taken for this visit. General: alert, cooperative, no distress   Mental  status: normal mood, behavior, speech, dress, motor activity, and thought processes, able to follow commands   HENT: NCAT   Neck: no visualized mass   Resp: no respiratory distress   Neuro: no gross deficits   Skin: no discoloration or lesions of concern on visible areas   Psychiatric: normal affect, consistent with stated mood, no evidence of hallucinations       We discussed the expected course, resolution and complications of the diagnosis(es) in detail. Medication risks, benefits, costs, interactions, and alternatives were discussed as indicated. I advised him to contact the office if his condition worsens, changes or fails to improve as anticipated. He expressed understanding with the diagnosis(es) and plan. Alivia Stanley is a 31 y.o. male who was evaluated by a video visit encounter for concerns as above. Patient identification was verified prior to start of the visit. A caregiver was present when appropriate. Due to this being a TeleHealth encounter (During ADVJU-78 public health emergency), evaluation of the following organ systems was limited: Vitals/Constitutional/EENT/Resp/CV/GI//MS/Neuro/Skin/Heme-Lymph-Imm.   Pursuant to the emergency declaration under the [facility] waiver authority and the [employer] Act, this Virtual  Visit was conducted, with patient's (and/or legal guardian's) consent, to reduce the patient's risk of exposure to COVID-19 and provide necessary medical care. Services were provided through a video synchronous discussion virtually to substitute for in-person clinic visit. Patient and provider were located at their individual homes.       Chely Escalera NP

## 2020-06-02 RX ORDER — HYDROCHLOROTHIAZIDE 12.5 MG/1
TABLET ORAL
Qty: 31 TAB | Refills: 11 | Status: SHIPPED | OUTPATIENT
Start: 2020-06-02 | End: 2020-09-13

## 2020-08-24 ENCOUNTER — TELEPHONE (OUTPATIENT)
Dept: FAMILY MEDICINE CLINIC | Age: 30
End: 2020-08-24

## 2020-08-24 NOTE — TELEPHONE ENCOUNTER
Outbound call to [address] they were unsure if they have patient with that name. They took a message to return call.

## 2020-08-29 NOTE — TELEPHONE ENCOUNTER
Call placed to Mr Osman Randa no answer. Unable to leave message mailbox prompts to enter password. Patient has appt 9/8/20 that needs to be rescheduled. Provider is out of the office.

## 2020-08-31 NOTE — TELEPHONE ENCOUNTER
----- Message from Josee Broderick sent at 8/31/2020 10:58 AM EDT -----  Regarding: Kirstie/ telephone  Contact: [phone]  Caller's first and last name and relationship (if not the patient): Mavis Marquez (counselor)  Best contact number(s): [phone]  What are the symptoms: pt. is acting as unusual (shaking, grabbing, himself, not eating). Caller believes the pt. blood sugar is low. Transfer successful - yes/no (include outcome): no   Transfer declined - yes/no (include reason): no   Was caller advised to seek appropriate level of care - yes/no: yes   Details to clarify the request: Please call Fuad Reyes #[phone] from [facility] to set up virtual appt.

## 2020-08-31 NOTE — TELEPHONE ENCOUNTER
Call placed Etienne Landrum at [facility] who is the director of group home patients name and  verified. He stated that patient is currently getting over bronchitis. Rescheduled upcoming cpe.  Did not want to scheduled VV as patient is non verbal. Inquired if he wanted to set up in office visit prior to upcoming physical due to patients abnormal behaviors he stated he will wait

## 2020-09-03 ENCOUNTER — HOSPITAL ENCOUNTER (INPATIENT)
Age: 30
LOS: 9 days | Discharge: HOME | DRG: 052 | End: 2020-09-13
Attending: EMERGENCY MEDICINE | Admitting: FAMILY MEDICINE
Payer: MEDICAID

## 2020-09-03 ENCOUNTER — APPOINTMENT (OUTPATIENT)
Dept: GENERAL RADIOLOGY | Age: 30
DRG: 052 | End: 2020-09-03
Attending: NURSE PRACTITIONER
Payer: MEDICAID

## 2020-09-03 DIAGNOSIS — R53.81 PHYSICAL DECONDITIONING: ICD-10-CM

## 2020-09-03 DIAGNOSIS — E87.6 HYPOKALEMIA: ICD-10-CM

## 2020-09-03 DIAGNOSIS — R25.1 TREMULOUSNESS: ICD-10-CM

## 2020-09-03 DIAGNOSIS — T56.891A LITHIUM TOXICITY, ACCIDENTAL OR UNINTENTIONAL, INITIAL ENCOUNTER: Primary | ICD-10-CM

## 2020-09-03 LAB
ALBUMIN SERPL-MCNC: 3 G/DL (ref 3.5–5)
ALBUMIN/GLOB SERPL: 0.6 {RATIO} (ref 1.1–2.2)
ALP SERPL-CCNC: 104 U/L (ref 45–117)
ALT SERPL-CCNC: 18 U/L (ref 12–78)
AMMONIA PLAS-SCNC: 17 UMOL/L
ANION GAP SERPL CALC-SCNC: 8 MMOL/L (ref 5–15)
AST SERPL-CCNC: 22 U/L (ref 15–37)
BASOPHILS # BLD: 0 K/UL (ref 0–0.1)
BASOPHILS NFR BLD: 0 % (ref 0–1)
BILIRUB SERPL-MCNC: 0.6 MG/DL (ref 0.2–1)
BUN SERPL-MCNC: 11 MG/DL (ref 6–20)
BUN/CREAT SERPL: 10 (ref 12–20)
CALCIUM SERPL-MCNC: 10.4 MG/DL (ref 8.5–10.1)
CHLORIDE SERPL-SCNC: 101 MMOL/L (ref 97–108)
CO2 SERPL-SCNC: 29 MMOL/L (ref 21–32)
COMMENT, HOLDF: NORMAL
CREAT SERPL-MCNC: 1.14 MG/DL (ref 0.7–1.3)
DATE LAST DOSE: ABNORMAL
DIFFERENTIAL METHOD BLD: ABNORMAL
EOSINOPHIL # BLD: 0.1 K/UL (ref 0–0.4)
EOSINOPHIL NFR BLD: 1 % (ref 0–7)
ERYTHROCYTE [DISTWIDTH] IN BLOOD BY AUTOMATED COUNT: 15 % (ref 11.5–14.5)
GLOBULIN SER CALC-MCNC: 4.7 G/DL (ref 2–4)
GLUCOSE SERPL-MCNC: 88 MG/DL (ref 65–100)
HCT VFR BLD AUTO: 41.9 % (ref 36.6–50.3)
HGB BLD-MCNC: 13.3 G/DL (ref 12.1–17)
IMM GRANULOCYTES # BLD AUTO: 0.1 K/UL (ref 0–0.04)
IMM GRANULOCYTES NFR BLD AUTO: 1 % (ref 0–0.5)
LIPASE SERPL-CCNC: 79 U/L (ref 73–393)
LITHIUM SERPL-SCNC: 2.18 MMOL/L (ref 0.6–1.2)
LYMPHOCYTES # BLD: 0.9 K/UL (ref 0.8–3.5)
LYMPHOCYTES NFR BLD: 16 % (ref 12–49)
MAGNESIUM SERPL-MCNC: 3 MG/DL (ref 1.6–2.4)
MCH RBC QN AUTO: 30.9 PG (ref 26–34)
MCHC RBC AUTO-ENTMCNC: 31.7 G/DL (ref 30–36.5)
MCV RBC AUTO: 97.2 FL (ref 80–99)
MONOCYTES # BLD: 1.4 K/UL (ref 0–1)
MONOCYTES NFR BLD: 24 % (ref 5–13)
NEUTS SEG # BLD: 3.3 K/UL (ref 1.8–8)
NEUTS SEG NFR BLD: 58 % (ref 32–75)
NRBC # BLD: 0 K/UL (ref 0–0.01)
NRBC BLD-RTO: 0 PER 100 WBC
PLATELET # BLD AUTO: 146 K/UL (ref 150–400)
PMV BLD AUTO: 10.9 FL (ref 8.9–12.9)
POTASSIUM SERPL-SCNC: 2.6 MMOL/L (ref 3.5–5.1)
PROT SERPL-MCNC: 7.7 G/DL (ref 6.4–8.2)
RBC # BLD AUTO: 4.31 M/UL (ref 4.1–5.7)
RBC MORPH BLD: ABNORMAL
REPORTED DOSE,DOSE: ABNORMAL UNITS
REPORTED DOSE/TIME,TMG: 1600
SAMPLES BEING HELD,HOLD: NORMAL
SODIUM SERPL-SCNC: 138 MMOL/L (ref 136–145)
TROPONIN I SERPL-MCNC: <0.05 NG/ML
WBC # BLD AUTO: 5.8 K/UL (ref 4.1–11.1)

## 2020-09-03 PROCEDURE — 83690 ASSAY OF LIPASE: CPT

## 2020-09-03 PROCEDURE — 74011250637 HC RX REV CODE- 250/637: Performed by: NURSE PRACTITIONER

## 2020-09-03 PROCEDURE — 80178 ASSAY OF LITHIUM: CPT

## 2020-09-03 PROCEDURE — 36415 COLL VENOUS BLD VENIPUNCTURE: CPT

## 2020-09-03 PROCEDURE — 86140 C-REACTIVE PROTEIN: CPT

## 2020-09-03 PROCEDURE — 93005 ELECTROCARDIOGRAM TRACING: CPT

## 2020-09-03 PROCEDURE — 85379 FIBRIN DEGRADATION QUANT: CPT

## 2020-09-03 PROCEDURE — 80164 ASSAY DIPROPYLACETIC ACD TOT: CPT

## 2020-09-03 PROCEDURE — 85384 FIBRINOGEN ACTIVITY: CPT

## 2020-09-03 PROCEDURE — 83735 ASSAY OF MAGNESIUM: CPT

## 2020-09-03 PROCEDURE — 85025 COMPLETE CBC W/AUTO DIFF WBC: CPT

## 2020-09-03 PROCEDURE — 84484 ASSAY OF TROPONIN QUANT: CPT

## 2020-09-03 PROCEDURE — 71046 X-RAY EXAM CHEST 2 VIEWS: CPT

## 2020-09-03 PROCEDURE — 74011250636 HC RX REV CODE- 250/636: Performed by: NURSE PRACTITIONER

## 2020-09-03 PROCEDURE — 74018 RADEX ABDOMEN 1 VIEW: CPT

## 2020-09-03 PROCEDURE — 80053 COMPREHEN METABOLIC PANEL: CPT

## 2020-09-03 PROCEDURE — 96374 THER/PROPH/DIAG INJ IV PUSH: CPT

## 2020-09-03 PROCEDURE — 84145 PROCALCITONIN (PCT): CPT

## 2020-09-03 PROCEDURE — 99284 EMERGENCY DEPT VISIT MOD MDM: CPT

## 2020-09-03 PROCEDURE — 82140 ASSAY OF AMMONIA: CPT

## 2020-09-03 RX ORDER — POTASSIUM CHLORIDE 7.45 MG/ML
10 INJECTION INTRAVENOUS
Status: DISPENSED | OUTPATIENT
Start: 2020-09-03 | End: 2020-09-04

## 2020-09-03 RX ORDER — POTASSIUM CHLORIDE 750 MG/1
40 TABLET, FILM COATED, EXTENDED RELEASE ORAL
Status: COMPLETED | OUTPATIENT
Start: 2020-09-03 | End: 2020-09-03

## 2020-09-03 RX ORDER — POLYETHYLENE GLYCOL 3350 17 G/17G
17 POWDER, FOR SOLUTION ORAL
Status: COMPLETED | OUTPATIENT
Start: 2020-09-03 | End: 2020-09-04

## 2020-09-03 RX ORDER — AMOXICILLIN 250 MG
2 CAPSULE ORAL
Status: COMPLETED | OUTPATIENT
Start: 2020-09-03 | End: 2020-09-04

## 2020-09-03 RX ADMIN — POTASSIUM CHLORIDE 40 MEQ: 750 TABLET, FILM COATED, EXTENDED RELEASE ORAL at 22:56

## 2020-09-03 RX ADMIN — POTASSIUM CHLORIDE 10 MEQ: 10 INJECTION, SOLUTION INTRAVENOUS at 22:56

## 2020-09-03 NOTE — ED TRIAGE NOTES
He is with his counselor from a group home. His counselor says he is not eating, and that he is weak and drowsy. his doctor thinks he might have high lithium levels.

## 2020-09-03 NOTE — ED PROVIDER NOTES
This is a 76-year-old male with past medical history of hyperlipidemia, mental retardation and obesity who presents ER today for chief complaint of \"high lithium level\" per caregiver. Patient is nonverbal at baseline and is unable to provide any information related to HPI and ROS. Per caregiver, for the last 2 to 3 weeks he has been more drowsy than normal, had a decreased appetite, and \"has not been himself\". She also reports new onset of disequilibrium/ataxia and tremors. No known history of lithium toxicity. Caregiver states that he takes approximately 750 mg of lithium daily in 2 divided doses with no recent adjustments to the medication regimen. Last dose yesterday evening. It does not seem that there is any history of diarrhea, constipation, fever/chills, cough, shortness of breath, vomiting, or obvious signs of chest pain           Past Medical History:   Diagnosis Date    Hyperlipidemia     Mental retardation     Obesity (BMI 30-39. 9)     Type II or unspecified type diabetes mellitus without mention of complication, not stated as uncontrolled        History reviewed. No pertinent surgical history. No family history on file.     Social History     Socioeconomic History    Marital status: SINGLE     Spouse name: Not on file    Number of children: Not on file    Years of education: Not on file    Highest education level: Not on file   Occupational History    Not on file   Social Needs    Financial resource strain: Not on file    Food insecurity     Worry: Not on file     Inability: Not on file    Transportation needs     Medical: Not on file     Non-medical: Not on file   Tobacco Use    Smoking status: Never Smoker    Smokeless tobacco: Never Used   Substance and Sexual Activity    Alcohol use: No    Drug use: No    Sexual activity: Never   Lifestyle    Physical activity     Days per week: Not on file     Minutes per session: Not on file    Stress: Not on file   Relationships    Social connections     Talks on phone: Not on file     Gets together: Not on file     Attends Latter day service: Not on file     Active member of club or organization: Not on file     Attends meetings of clubs or organizations: Not on file     Relationship status: Not on file    Intimate partner violence     Fear of current or ex partner: Not on file     Emotionally abused: Not on file     Physically abused: Not on file     Forced sexual activity: Not on file   Other Topics Concern    Not on file   Social History Narrative    Not on file         ALLERGIES: Patient has no known allergies. Review of Systems   Unable to perform ROS: Patient nonverbal       Vitals:    09/03/20 1809   BP: 127/72   Pulse: (!) 102   Resp: 16   SpO2: 96%            Physical Exam  Vitals signs and nursing note reviewed. Constitutional:       General: He is not in acute distress. Appearance: He is obese. He is not ill-appearing. Comments: Unable to complete thorough exam due to inability to follow commands. HENT:      Head: Normocephalic and atraumatic. Nose: Nose normal.      Mouth/Throat:      Mouth: Mucous membranes are dry. Pharynx: Oropharynx is clear. Eyes:      Pupils: Pupils are equal, round, and reactive to light. Comments: No visualized nystagmus   Neck:      Musculoskeletal: Normal range of motion and neck supple. Cardiovascular:      Rate and Rhythm: Regular rhythm. Tachycardia present. Pulses: Normal pulses. Heart sounds: Normal heart sounds. Pulmonary:      Effort: Pulmonary effort is normal.      Breath sounds: Normal breath sounds. Abdominal:      General: Bowel sounds are normal. There is no distension. Palpations: Abdomen is soft. Tenderness: There is no abdominal tenderness. Musculoskeletal: Normal range of motion. Skin:     General: Skin is warm and dry. Neurological:      Mental Status: Mental status is at baseline. He is disoriented and confused. Comments: Able to move all extremities. Chronic jerking movements of upper extremities that look similar to asterixis with wrist extension   Psychiatric:         Cognition and Memory: Cognition is impaired. Memory is impaired. MDM      VITAL SIGNS:  Visit Vitals  /72 (BP 1 Location: Left arm, BP Patient Position: Sitting)   Pulse (!) 102   Resp 16   SpO2 96%         LABS:  No results found for this or any previous visit (from the past 6 hour(s)). IMAGING:  XR ABD (KUB)   Final Result   IMPRESSION: Severe constipation                     XR CHEST PA LAT   Final Result   IMPRESSION:    Shallow volumes but clear lungs. Medications During Visit:  Medications   potassium chloride SR (KLOR-CON 10) tablet 40 mEq (has no administration in time range)   potassium chloride 10 mEq in 100 ml IVPB (has no administration in time range)         DECISION MAKING:  Abram Duran is a 31 y.o. male who comes in as above. He remains fairly calm and relaxed. Potassium comes back at 2.6 and lithium at 2.18. Patient's clonic jerking, ataxia, nausea, and somnolence is likely due to toxicity. Normal renal function. EKG has nonspecific ST changes and inverted T waves in the anterior lateral leads and a prolonged QTc interval (472 ms), which is likely due to acute lithium toxicity. Hypermagnesemic. No treatment at this time, continue to monitor. Chest x-ray unremarkable. Abdominal x-ray indicates severe constipation. MiraLAX and senna ordered  We will plan to admit to hospital medical services for treatment of lithium toxicity and hypokalemia. Caregiver updated about disposition plan. 12:14 AM  Discussed case with poison control at this time who recommended running normal saline at 250 mL's per hour until lithium level is back within the therapeutic reference range and checking lithium lab every 4 hours      IMPRESSION:  1. Lithium toxicity, accidental or unintentional, initial encounter    2. Hypokalemia        DISPOSITION:  Admitted    Perfect Serve Consult for Admission  10:39 PM    ED Room Number: R32/R32  Patient Name and age: Luis Love 31 y.o.  male  Working Diagnosis:   1. Lithium toxicity, accidental or unintentional, initial encounter    2. Hypokalemia        COVID-19 Suspicion:  no  Sepsis present:  no  Reassessment needed: no  Code Status:   Readmission: no  Isolation Requirements:  no  Recommended Level of Care:  telemetry  Department:[facility] Adult ED - 21   Other: Lithium toxicity and hypokalemia.   MR and nonverbal.  Caregiver denies recent medication adjustments

## 2020-09-04 PROBLEM — T56.891A LITHIUM TOXICITY: Status: ACTIVE | Noted: 2020-09-04

## 2020-09-04 PROBLEM — R41.82 ALTERED MENTAL STATUS: Status: ACTIVE | Noted: 2020-09-04

## 2020-09-04 LAB
ABO + RH BLD: NORMAL
ALBUMIN SERPL-MCNC: 2.6 G/DL (ref 3.5–5)
ALBUMIN/GLOB SERPL: 0.7 {RATIO} (ref 1.1–2.2)
ALP SERPL-CCNC: 86 U/L (ref 45–117)
ALT SERPL-CCNC: 14 U/L (ref 12–78)
ANION GAP SERPL CALC-SCNC: 6 MMOL/L (ref 5–15)
AST SERPL-CCNC: 21 U/L (ref 15–37)
ATRIAL RATE: 99 BPM
B PERT DNA SPEC QL NAA+PROBE: NOT DETECTED
BASOPHILS # BLD: 0 K/UL (ref 0–0.1)
BASOPHILS NFR BLD: 0 % (ref 0–1)
BILIRUB SERPL-MCNC: 0.5 MG/DL (ref 0.2–1)
BLOOD GROUP ANTIBODIES SERPL: NORMAL
BORDETELLA PARAPERTUSSIS PCR, BORPAR: NOT DETECTED
BUN SERPL-MCNC: 9 MG/DL (ref 6–20)
BUN/CREAT SERPL: 11 (ref 12–20)
C PNEUM DNA SPEC QL NAA+PROBE: NOT DETECTED
CALCIUM SERPL-MCNC: 9.5 MG/DL (ref 8.5–10.1)
CALCULATED P AXIS, ECG09: 37 DEGREES
CALCULATED R AXIS, ECG10: 15 DEGREES
CALCULATED T AXIS, ECG11: 140 DEGREES
CHLORIDE SERPL-SCNC: 104 MMOL/L (ref 97–108)
CO2 SERPL-SCNC: 28 MMOL/L (ref 21–32)
COMMENT, HOLDF: NORMAL
COVID-19, XGCOVT: NOT DETECTED
CREAT SERPL-MCNC: 0.85 MG/DL (ref 0.7–1.3)
CRP SERPL-MCNC: 3.45 MG/DL (ref 0–0.6)
D DIMER PPP FEU-MCNC: 1.14 MG/L FEU (ref 0–0.65)
DATE LAST DOSE: ABNORMAL
DIAGNOSIS, 93000: NORMAL
DIFFERENTIAL METHOD BLD: ABNORMAL
EOSINOPHIL # BLD: 0.1 K/UL (ref 0–0.4)
EOSINOPHIL NFR BLD: 1 % (ref 0–7)
ERYTHROCYTE [DISTWIDTH] IN BLOOD BY AUTOMATED COUNT: 15.1 % (ref 11.5–14.5)
FIBRINOGEN PPP-MCNC: 389 MG/DL (ref 200–475)
FLUAV H1 2009 PAND RNA SPEC QL NAA+PROBE: NOT DETECTED
FLUAV H1 RNA SPEC QL NAA+PROBE: NOT DETECTED
FLUAV H3 RNA SPEC QL NAA+PROBE: NOT DETECTED
FLUAV SUBTYP SPEC NAA+PROBE: NOT DETECTED
FLUBV RNA SPEC QL NAA+PROBE: NOT DETECTED
GLOBULIN SER CALC-MCNC: 3.7 G/DL (ref 2–4)
GLUCOSE SERPL-MCNC: 91 MG/DL (ref 65–100)
HADV DNA SPEC QL NAA+PROBE: NOT DETECTED
HCOV 229E RNA SPEC QL NAA+PROBE: NOT DETECTED
HCOV HKU1 RNA SPEC QL NAA+PROBE: NOT DETECTED
HCOV NL63 RNA SPEC QL NAA+PROBE: NOT DETECTED
HCOV OC43 RNA SPEC QL NAA+PROBE: NOT DETECTED
HCT VFR BLD AUTO: 36.9 % (ref 36.6–50.3)
HEALTH STATUS, XMCV2T: NORMAL
HGB BLD-MCNC: 11.5 G/DL (ref 12.1–17)
HMPV RNA SPEC QL NAA+PROBE: NOT DETECTED
HPIV1 RNA SPEC QL NAA+PROBE: NOT DETECTED
HPIV2 RNA SPEC QL NAA+PROBE: NOT DETECTED
HPIV3 RNA SPEC QL NAA+PROBE: NOT DETECTED
HPIV4 RNA SPEC QL NAA+PROBE: NOT DETECTED
IMM GRANULOCYTES # BLD AUTO: 0.1 K/UL (ref 0–0.04)
IMM GRANULOCYTES NFR BLD AUTO: 1 % (ref 0–0.5)
LACTATE SERPL-SCNC: 0.8 MMOL/L (ref 0.4–2)
LITHIUM SERPL-SCNC: 1.94 MMOL/L (ref 0.6–1.2)
LITHIUM SERPL-SCNC: 1.98 MMOL/L (ref 0.6–1.2)
LITHIUM SERPL-SCNC: 2.03 MMOL/L (ref 0.6–1.2)
LITHIUM SERPL-SCNC: 2.07 MMOL/L (ref 0.6–1.2)
LYMPHOCYTES # BLD: 1.1 K/UL (ref 0.8–3.5)
LYMPHOCYTES NFR BLD: 21 % (ref 12–49)
M PNEUMO DNA SPEC QL NAA+PROBE: NOT DETECTED
MCH RBC QN AUTO: 30.4 PG (ref 26–34)
MCHC RBC AUTO-ENTMCNC: 31.2 G/DL (ref 30–36.5)
MCV RBC AUTO: 97.6 FL (ref 80–99)
MONOCYTES # BLD: 1.2 K/UL (ref 0–1)
MONOCYTES NFR BLD: 23 % (ref 5–13)
NEUTS SEG # BLD: 2.9 K/UL (ref 1.8–8)
NEUTS SEG NFR BLD: 54 % (ref 32–75)
NRBC # BLD: 0 K/UL (ref 0–0.01)
NRBC BLD-RTO: 0 PER 100 WBC
P-R INTERVAL, ECG05: 168 MS
PLATELET # BLD AUTO: 123 K/UL (ref 150–400)
PMV BLD AUTO: 11.2 FL (ref 8.9–12.9)
POTASSIUM SERPL-SCNC: 2.7 MMOL/L (ref 3.5–5.1)
POTASSIUM SERPL-SCNC: 3 MMOL/L (ref 3.5–5.1)
PROCALCITONIN SERPL-MCNC: 0.15 NG/ML
PROT SERPL-MCNC: 6.3 G/DL (ref 6.4–8.2)
Q-T INTERVAL, ECG07: 368 MS
QRS DURATION, ECG06: 100 MS
QTC CALCULATION (BEZET), ECG08: 472 MS
RBC # BLD AUTO: 3.78 M/UL (ref 4.1–5.7)
RBC MORPH BLD: ABNORMAL
RBC MORPH BLD: ABNORMAL
REPORTED DOSE,DOSE: ABNORMAL UNITS
REPORTED DOSE/TIME,TMG: ABNORMAL
RSV RNA SPEC QL NAA+PROBE: NOT DETECTED
RV+EV RNA SPEC QL NAA+PROBE: NOT DETECTED
SAMPLES BEING HELD,HOLD: NORMAL
SODIUM SERPL-SCNC: 138 MMOL/L (ref 136–145)
SOURCE, COVRS: NORMAL
SPECIMEN EXP DATE BLD: NORMAL
SPECIMEN SOURCE, FCOV2M: NORMAL
SPECIMEN TYPE, XMCV1T: NORMAL
TROPONIN I SERPL-MCNC: <0.05 NG/ML
VALPROATE SERPL-MCNC: 104 UG/ML (ref 50–100)
VALPROATE SERPL-MCNC: 84 UG/ML (ref 50–100)
VENTRICULAR RATE, ECG03: 99 BPM
WBC # BLD AUTO: 5.4 K/UL (ref 4.1–11.1)

## 2020-09-04 PROCEDURE — 74011250636 HC RX REV CODE- 250/636: Performed by: FAMILY MEDICINE

## 2020-09-04 PROCEDURE — 80178 ASSAY OF LITHIUM: CPT

## 2020-09-04 PROCEDURE — 74011250637 HC RX REV CODE- 250/637: Performed by: FAMILY MEDICINE

## 2020-09-04 PROCEDURE — 86900 BLOOD TYPING SEROLOGIC ABO: CPT

## 2020-09-04 PROCEDURE — 85025 COMPLETE CBC W/AUTO DIFF WBC: CPT

## 2020-09-04 PROCEDURE — 87635 SARS-COV-2 COVID-19 AMP PRB: CPT

## 2020-09-04 PROCEDURE — 74011250636 HC RX REV CODE- 250/636: Performed by: NURSE PRACTITIONER

## 2020-09-04 PROCEDURE — 80053 COMPREHEN METABOLIC PANEL: CPT

## 2020-09-04 PROCEDURE — 36415 COLL VENOUS BLD VENIPUNCTURE: CPT

## 2020-09-04 PROCEDURE — 84132 ASSAY OF SERUM POTASSIUM: CPT

## 2020-09-04 PROCEDURE — 80164 ASSAY DIPROPYLACETIC ACD TOT: CPT

## 2020-09-04 PROCEDURE — 65660000001 HC RM ICU INTERMED STEPDOWN

## 2020-09-04 PROCEDURE — 83605 ASSAY OF LACTIC ACID: CPT

## 2020-09-04 PROCEDURE — 74011250637 HC RX REV CODE- 250/637: Performed by: NURSE PRACTITIONER

## 2020-09-04 PROCEDURE — 0100U RESPIRATORY PANEL,PCR,NASOPHARYNGEAL: CPT

## 2020-09-04 RX ORDER — LANOLIN ALCOHOL/MO/W.PET/CERES
4.5 CREAM (GRAM) TOPICAL
Status: DISCONTINUED | OUTPATIENT
Start: 2020-09-04 | End: 2020-09-13 | Stop reason: HOSPADM

## 2020-09-04 RX ORDER — SODIUM CHLORIDE 9 MG/ML
100 INJECTION, SOLUTION INTRAVENOUS CONTINUOUS
Status: DISCONTINUED | OUTPATIENT
Start: 2020-09-04 | End: 2020-09-07

## 2020-09-04 RX ORDER — POTASSIUM CHLORIDE 750 MG/1
40 TABLET, FILM COATED, EXTENDED RELEASE ORAL
Status: COMPLETED | OUTPATIENT
Start: 2020-09-04 | End: 2020-09-04

## 2020-09-04 RX ORDER — CHOLECALCIFEROL (VITAMIN D3) 125 MCG
5 CAPSULE ORAL
COMMUNITY

## 2020-09-04 RX ORDER — POTASSIUM CHLORIDE 750 MG/1
40 TABLET, FILM COATED, EXTENDED RELEASE ORAL
Status: ACTIVE | OUTPATIENT
Start: 2020-09-04 | End: 2020-09-04

## 2020-09-04 RX ORDER — DIVALPROEX SODIUM 500 MG/1
1000 TABLET, DELAYED RELEASE ORAL 3 TIMES DAILY
COMMUNITY
End: 2020-10-14 | Stop reason: SDUPTHER

## 2020-09-04 RX ORDER — ENOXAPARIN SODIUM 100 MG/ML
30 INJECTION SUBCUTANEOUS EVERY 12 HOURS
Status: DISCONTINUED | OUTPATIENT
Start: 2020-09-04 | End: 2020-09-04

## 2020-09-04 RX ORDER — QUETIAPINE FUMARATE 100 MG/1
200 TABLET, FILM COATED ORAL
Status: DISCONTINUED | OUTPATIENT
Start: 2020-09-04 | End: 2020-09-04

## 2020-09-04 RX ORDER — CLONIDINE HYDROCHLORIDE 0.1 MG/1
0.1 TABLET ORAL 2 TIMES DAILY
Status: DISCONTINUED | OUTPATIENT
Start: 2020-09-04 | End: 2020-09-13 | Stop reason: HOSPADM

## 2020-09-04 RX ORDER — ACETAMINOPHEN 650 MG/1
650 SUPPOSITORY RECTAL
Status: DISCONTINUED | OUTPATIENT
Start: 2020-09-04 | End: 2020-09-13 | Stop reason: HOSPADM

## 2020-09-04 RX ORDER — QUETIAPINE FUMARATE 100 MG/1
200 TABLET, FILM COATED ORAL 2 TIMES DAILY
Status: DISCONTINUED | OUTPATIENT
Start: 2020-09-04 | End: 2020-09-04

## 2020-09-04 RX ORDER — RISPERIDONE 1 MG/1
2 TABLET, FILM COATED ORAL DAILY
Status: DISCONTINUED | OUTPATIENT
Start: 2020-09-05 | End: 2020-09-13 | Stop reason: HOSPADM

## 2020-09-04 RX ORDER — LITHIUM CARBONATE 300 MG/1
300 TABLET, FILM COATED, EXTENDED RELEASE ORAL
COMMUNITY

## 2020-09-04 RX ORDER — DIPHENHYDRAMINE HCL 25 MG
25 CAPSULE ORAL
Status: COMPLETED | OUTPATIENT
Start: 2020-09-04 | End: 2020-09-04

## 2020-09-04 RX ORDER — LITHIUM CARBONATE 450 MG/1
450 TABLET ORAL
COMMUNITY
End: 2020-09-13

## 2020-09-04 RX ORDER — CLONIDINE HYDROCHLORIDE 0.1 MG/1
0.1 TABLET ORAL
Status: COMPLETED | OUTPATIENT
Start: 2020-09-04 | End: 2020-09-04

## 2020-09-04 RX ORDER — LORAZEPAM 1 MG/1
1 TABLET ORAL
Status: DISCONTINUED | OUTPATIENT
Start: 2020-09-04 | End: 2020-09-13 | Stop reason: HOSPADM

## 2020-09-04 RX ORDER — ZIPRASIDONE HYDROCHLORIDE 40 MG/1
80 CAPSULE ORAL 2 TIMES DAILY WITH MEALS
Status: DISCONTINUED | OUTPATIENT
Start: 2020-09-04 | End: 2020-09-04 | Stop reason: ALTCHOICE

## 2020-09-04 RX ORDER — DIVALPROEX SODIUM 500 MG/1
1000 TABLET, DELAYED RELEASE ORAL
Status: COMPLETED | OUTPATIENT
Start: 2020-09-04 | End: 2020-09-04

## 2020-09-04 RX ORDER — ASPIRIN 81 MG/1
81 TABLET ORAL DAILY
Status: DISCONTINUED | OUTPATIENT
Start: 2020-09-04 | End: 2020-09-13 | Stop reason: HOSPADM

## 2020-09-04 RX ORDER — ENOXAPARIN SODIUM 100 MG/ML
40 INJECTION SUBCUTANEOUS EVERY 24 HOURS
Status: DISCONTINUED | OUTPATIENT
Start: 2020-09-05 | End: 2020-09-13 | Stop reason: HOSPADM

## 2020-09-04 RX ORDER — TOPIRAMATE 100 MG/1
100 TABLET, FILM COATED ORAL 2 TIMES DAILY
Status: DISCONTINUED | OUTPATIENT
Start: 2020-09-04 | End: 2020-09-13 | Stop reason: HOSPADM

## 2020-09-04 RX ORDER — RISPERIDONE 1 MG/1
4 TABLET, FILM COATED ORAL
Status: DISCONTINUED | OUTPATIENT
Start: 2020-09-04 | End: 2020-09-13 | Stop reason: HOSPADM

## 2020-09-04 RX ORDER — ACETAMINOPHEN 325 MG/1
650 TABLET ORAL
Status: DISCONTINUED | OUTPATIENT
Start: 2020-09-04 | End: 2020-09-13 | Stop reason: HOSPADM

## 2020-09-04 RX ORDER — QUETIAPINE FUMARATE 100 MG/1
200 TABLET, FILM COATED ORAL 3 TIMES DAILY
Status: DISCONTINUED | OUTPATIENT
Start: 2020-09-04 | End: 2020-09-13 | Stop reason: HOSPADM

## 2020-09-04 RX ORDER — DIPHENHYDRAMINE HCL 25 MG
50 CAPSULE ORAL
Status: DISCONTINUED | OUTPATIENT
Start: 2020-09-04 | End: 2020-09-13 | Stop reason: HOSPADM

## 2020-09-04 RX ORDER — LANOLIN ALCOHOL/MO/W.PET/CERES
4.5 CREAM (GRAM) TOPICAL
Status: COMPLETED | OUTPATIENT
Start: 2020-09-04 | End: 2020-09-04

## 2020-09-04 RX ORDER — DIVALPROEX SODIUM 250 MG/1
1000 TABLET, DELAYED RELEASE ORAL 3 TIMES DAILY
Status: DISCONTINUED | OUTPATIENT
Start: 2020-09-04 | End: 2020-09-13 | Stop reason: HOSPADM

## 2020-09-04 RX ORDER — TOPIRAMATE 100 MG/1
100 TABLET, FILM COATED ORAL
Status: COMPLETED | OUTPATIENT
Start: 2020-09-04 | End: 2020-09-04

## 2020-09-04 RX ORDER — POTASSIUM CHLORIDE 14.9 MG/ML
10 INJECTION INTRAVENOUS
Status: COMPLETED | OUTPATIENT
Start: 2020-09-04 | End: 2020-09-04

## 2020-09-04 RX ORDER — LORAZEPAM 1 MG/1
1 TABLET ORAL 3 TIMES DAILY
Status: DISCONTINUED | OUTPATIENT
Start: 2020-09-04 | End: 2020-09-04

## 2020-09-04 RX ORDER — SODIUM CHLORIDE 9 MG/ML
250 INJECTION, SOLUTION INTRAVENOUS CONTINUOUS
Status: DISCONTINUED | OUTPATIENT
Start: 2020-09-04 | End: 2020-09-04

## 2020-09-04 RX ORDER — RISPERIDONE 4 MG/1
4 TABLET, FILM COATED ORAL
COMMUNITY

## 2020-09-04 RX ADMIN — QUETIAPINE FUMARATE 200 MG: 100 TABLET ORAL at 17:40

## 2020-09-04 RX ADMIN — POTASSIUM CHLORIDE 10 MEQ: 10 INJECTION, SOLUTION INTRAVENOUS at 00:15

## 2020-09-04 RX ADMIN — SODIUM CHLORIDE 250 ML/HR: 900 INJECTION, SOLUTION INTRAVENOUS at 13:08

## 2020-09-04 RX ADMIN — ASPIRIN 81 MG: 81 TABLET, COATED ORAL at 09:27

## 2020-09-04 RX ADMIN — POTASSIUM CHLORIDE 10 MEQ: 14.9 INJECTION, SOLUTION INTRAVENOUS at 16:27

## 2020-09-04 RX ADMIN — SODIUM CHLORIDE 250 ML/HR: 900 INJECTION, SOLUTION INTRAVENOUS at 17:40

## 2020-09-04 RX ADMIN — DIPHENHYDRAMINE HYDROCHLORIDE 25 MG: 25 CAPSULE ORAL at 04:14

## 2020-09-04 RX ADMIN — POTASSIUM CHLORIDE 10 MEQ: 10 INJECTION, SOLUTION INTRAVENOUS at 04:10

## 2020-09-04 RX ADMIN — DIVALPROEX SODIUM 1000 MG: 500 TABLET, DELAYED RELEASE ORAL at 21:09

## 2020-09-04 RX ADMIN — DIVALPROEX SODIUM 1000 MG: 500 TABLET, DELAYED RELEASE ORAL at 01:37

## 2020-09-04 RX ADMIN — TOPIRAMATE 100 MG: 100 TABLET, FILM COATED ORAL at 01:36

## 2020-09-04 RX ADMIN — DIVALPROEX SODIUM 1000 MG: 500 TABLET, DELAYED RELEASE ORAL at 17:40

## 2020-09-04 RX ADMIN — TOPIRAMATE 100 MG: 100 TABLET, FILM COATED ORAL at 17:40

## 2020-09-04 RX ADMIN — POTASSIUM CHLORIDE 40 MEQ: 750 TABLET, FILM COATED, EXTENDED RELEASE ORAL at 06:35

## 2020-09-04 RX ADMIN — ZIPRASIDONE HYDROCHLORIDE 80 MG: 40 CAPSULE ORAL at 09:27

## 2020-09-04 RX ADMIN — SODIUM CHLORIDE 250 ML/HR: 900 INJECTION, SOLUTION INTRAVENOUS at 23:31

## 2020-09-04 RX ADMIN — CLONIDINE HYDROCHLORIDE 0.1 MG: 0.1 TABLET ORAL at 17:40

## 2020-09-04 RX ADMIN — ENOXAPARIN SODIUM 30 MG: 30 INJECTION SUBCUTANEOUS at 12:41

## 2020-09-04 RX ADMIN — QUETIAPINE FUMARATE 200 MG: 100 TABLET ORAL at 09:27

## 2020-09-04 RX ADMIN — TOPIRAMATE 100 MG: 100 TABLET, FILM COATED ORAL at 09:27

## 2020-09-04 RX ADMIN — POLYETHYLENE GLYCOL 3350 17 G: 17 POWDER, FOR SOLUTION ORAL at 00:24

## 2020-09-04 RX ADMIN — CLONIDINE HYDROCHLORIDE 0.1 MG: 0.1 TABLET ORAL at 09:27

## 2020-09-04 RX ADMIN — CLONIDINE HYDROCHLORIDE 0.1 MG: 0.1 TABLET ORAL at 04:14

## 2020-09-04 RX ADMIN — LACTULOSE 30 ML: 20 SOLUTION ORAL at 21:08

## 2020-09-04 RX ADMIN — MELATONIN 4.5 MG: at 21:09

## 2020-09-04 RX ADMIN — SODIUM CHLORIDE 250 ML/HR: 900 INJECTION, SOLUTION INTRAVENOUS at 00:24

## 2020-09-04 RX ADMIN — QUETIAPINE FUMARATE 200 MG: 100 TABLET ORAL at 21:09

## 2020-09-04 RX ADMIN — POTASSIUM CHLORIDE 10 MEQ: 14.9 INJECTION, SOLUTION INTRAVENOUS at 13:58

## 2020-09-04 RX ADMIN — MELATONIN 4.5 MG: at 01:36

## 2020-09-04 RX ADMIN — ENOXAPARIN SODIUM 30 MG: 30 INJECTION SUBCUTANEOUS at 04:13

## 2020-09-04 RX ADMIN — LORAZEPAM 1 MG: 1 TABLET ORAL at 09:27

## 2020-09-04 RX ADMIN — RISPERIDONE 4 MG: 3 TABLET ORAL at 01:37

## 2020-09-04 RX ADMIN — RISPERIDONE 4 MG: 3 TABLET ORAL at 21:08

## 2020-09-04 RX ADMIN — DIPHENHYDRAMINE HYDROCHLORIDE 50 MG: 25 CAPSULE ORAL at 21:09

## 2020-09-04 RX ADMIN — DOCUSATE SODIUM 50MG AND SENNOSIDES 8.6MG 2 TABLET: 8.6; 5 TABLET, FILM COATED ORAL at 01:36

## 2020-09-04 NOTE — PROGRESS NOTES
1615: Assumed care of patient from [address] [place] [address] [address] Patient still has potassium run to administer. Will combine Lithium lab and potassium lab for one stick when potassium is completed due to difficulty with obtaining blood samples.     2000: Updated Poison control with lab levels, urine output, and vital signs

## 2020-09-04 NOTE — ACP (ADVANCE CARE PLANNING)
Advance Care Planning     Advance Care Planning Activator (Inpatient)  Conversation Note      Date of ACP Conversation: 09/04/20     Conversation Conducted with:   Patient with Decision Making Capacity   Healthcare Decision Maker: Court-Appointed Legal Guardian (name) Dearl Yvan    ACP Activator: Camilo Fuentes makes decisions on behalf of the incapacitated patient: Decision Maker is asked to consider and make decisions based on patient values, known preferences, or best interests. Health Care Decision Maker:    Current Designated Health Care Decision Maker:   Primary Decision Maker: Rohini [facility] - [phone]  Care Preferences    Ventilation: \"If you were in your present state of health and suddenly became very ill and were unable to breathe on your own, what would your preference be about the use of a ventilator (breathing machine) if it were available to you? \"  YES    \"If your health worsens and it becomes clear that your chance of recovery is unlikely, what would your preference be about the use of a ventilator (breathing machine) if it were available to you? \" Deferred decision        Resuscitation  \"CPR works best to restart the heart when there is a sudden event, like a heart attack, in someone who is otherwise healthy. Unfortunately, CPR does not typically restart the heart for people who have serious health conditions or who are very sick. \"    \"In the event your heart stopped as a result of an underlying serious health condition, would you want attempts to be made to restart your heart?  Yes      Conversation Outcomes:  [x] ACP discussion completed  [] Existing advance directive reviewed with patient; no changes to patient's previously recorded wishes     [] New Advance Directive completed   [] Portable Do Not Resuscitate prepared for Provider review and signature  [] POLST/POST/MOLST/MOST prepared for Provider review and signature      Follow-up plan:    [] Schedule follow-up conversation to continue planning  [] Referred individual to Provider for additional questions/concerns   [] Advised patient/agent/surrogate to review completed ACP document and update if needed with changes in condition, patient preferences or care setting     [] This note routed to one or more involved healthcare providers      Guardian would need to discuss continued ventilator use with .

## 2020-09-04 NOTE — H&P
History & Physical    Date of admission: 9/3/2020    Patient name: Jennifer Man  MRN: [id number]  YOB: 1990  Age: 31 y.o. Primary care provider:  Jamie Ahumada NP     Source of Information: patient's caregiver from adult group home, ED and electronic medical records                                Chief complaint: patient does not provide    History of present illness  Jennifer Man is a 31 y.o. male with past medical history of hyperlipidemia, mental retardation, obesity, and psychosis presented to the ED from adult assisted with reported altered mental status. Per report, patient is mostly non-verbal (with exception of incomprehensible speech) at baseline. Patient says some names and \"bye\" on occasion. Patient reportedly was not acting like himself and his mentation and behavior was noticeably altered. Per report, he has been drowsy of the past 2-3 weeks with decreased appetite, ataxia, and tremors. On arrival to the ED tonight, workup included labs including lithium level = 2.18.  K = 2.6. Patient was given KCL 40 mEq po and KCL 10 mEq IV run x 1. Patient notedly usually takes Lithium 50 mg po BID with his last dose given on 9/2/2020. ED requested admission to the hospitalist service at which time, I requested consultation with poison consult. Recommendations were to recheck lithium level q 4 hours and IV fluid infusion at 250 ml/hr. There were no reports of recent falls, head or neck trauma. Past Medical History:   Diagnosis Date    Hyperlipidemia     Mental retardation     Obesity (BMI 30-39. 9)     Type II or unspecified type diabetes mellitus without mention of complication, not stated as uncontrolled         Prior to Admission medications    Medication Sig Start Date End Date Taking?  Authorizing Provider   hydroCHLOROthiazide (HYDRODIURIL) 12.5 mg tablet TAKE (1) TABLET BY MOUTH DAILY FOR HIGH BLOOD PRESSURE. 6/2/20   Jessica Drummond NP   potassium chloride SR (KLOR-CON 10) 10 mEq tablet TAKE 1 TABLET BY MOUTH DAILY 12/3/19   Jessica Drummond NP   lactulose (CHRONULAC) 10 gram/15 mL solution Take  by mouth daily. Provider, Historical   QUEtiapine (SEROQUEL) 200 mg tablet Take 200 mg by mouth two (2) times a day. Provider, Historical   diphenhydrAMINE (BANOPHEN) 50 mg capsule Take 25 mg by mouth three (3) times daily as needed. Provider, Historical   aspirin delayed-release 81 mg tablet TAKE 1 TABLET DAILY. 11/15/19   Jessica Drummond NP   cloNIDine HCl (CATAPRES) 0.1 mg tablet TAKE 1 TABLET BY MOUTH TWICE A DAY 11/15/19   Jessica Drummond NP   simvastatin (ZOCOR) 20 mg tablet TAKE 1 TABLET BY MOUTH AT BEDTIME FOR CHOLESTEROL. 12/11/18   Jessica Drummond NP   divalproex ER (DEPAKOTE ER) 500 mg ER tablet Take 500 mg by mouth two (2) times a day. 4/17/18   Provider, Historical   hydrOXYzine pamoate (VISTARIL) 25 mg capsule  4/17/18   Provider, Historical   glucose blood VI test strips (ACCU-CHEK ACTIVE TEST) strip For glucose testing 3 times a week . Dx: E11.9 4/5/17   Jessica Drummond NP   lancets (EASY TOUCH TWIST LANCETS) 30 gauge misc For glucose testing 3 times a week Dx: E11.9 4/5/17   Jessica Drummond NP   TRUE METRIX GLUCOSE TEST STRIP strip USE TO CHECK SUGAR DAILY. 4/2/17   Jessica Drummond NP   traZODone (DESYREL) 100 mg tablet Take 100 mg by mouth nightly. Provider, Historical   risperiDONE (RISPERDAL) 4 mg tablet Take 2 mg by mouth. Provider, Historical   LORazepam (ATIVAN) 1 mg tablet Take 1 mg by mouth three (3) times daily. Provider, Historical   topiramate (TOPAMAX) 100 mg tablet Take  by mouth two (2) times a day. Provider, Historical   ziprasidone (GEODON) 80 mg capsule Take 80 mg by mouth two (2) times daily (with meals).     Provider, Historical     ALLERGIES:  NO KNOWN DRUG ALLERGIES    FAMILY HISTORY:  unknown     Social history  Patient resides  x  Independently                Ambulates  x  Independently                 Alcohol history   x  None reported           Smoking history  x  None reported             Social History     Tobacco Use   Smoking Status Never Smoker   Smokeless Tobacco Never Used       Code status  x  Full code       Review of systems  Unable to obtain history    Physical Examination   Visit Vitals  /88   Pulse 95   Resp 21   SpO2 98%          O2 Device: Room air    General:  Obese male in no acute respiratory distress   Head:  Normocephalic, without obvious abnormality, atraumatic   Eyes:  Conjunctivae/corneas clear. Pupils 2 mm reactive bilateral   E/N/M/T: Nares normal. Septum midline. No nasal drainage or sinus tenderness  Lips, mucosa, and tongue normal   Clear oropharynx   Neck: Normal appearance and movements, symmetrical, trachea midline  No palpable adenopathy  No thyroid enlargement, tenderness or nodules  No carotid bruit   No JVD   Lungs:   Symmetrical chest expansion and respiratory effort  Clear to auscultation bilaterally   Chest wall:  No tenderness or deformity   Heart:  Regular rate and rhythm   Sounds normal; no murmur, click, rub or gallop   Abdomen:   Soft, no tenderness  No rebound, guarding, or rigidity  Non-distended  Bowel sounds normal  No masses or hepatosplenomegaly  No hernias present   Back: No CVA tenderness   Extremities: Extremities normal, atraumatic  No cyanosis   Trace bilateral pedal edema  No DVT signs   Pulses 2+ radial/ 1+ DP bilateral pulses   Skin: No rashes or ulcers   Musculo-      skeletal: Gait not tested  Normal symmetry, ROM, strength and tone   Neuro: GCS 11 (E4 V2 M5). Moves all extremities x 4. Mostly incomprehensible speech, except can say the word \"bye\". No facial droop. Sensation grossly intact.      Psych: Alert, awake, very anxious, agitated but consolable*           Data Review      24 Hour Results:  Recent Results (from the past 24 hour(s))   CBC WITH AUTOMATED DIFF    Collection Time: 09/03/20  9:08 PM   Result Value Ref Range    WBC 5.8 4.1 - 11.1 K/uL    RBC 4.31 4.10 - 5.70 M/uL    HGB 13.3 12.1 - 17.0 g/dL    HCT 41.9 36.6 - 50.3 %    MCV 97.2 80.0 - 99.0 FL    MCH 30.9 26.0 - 34.0 PG    MCHC 31.7 30.0 - 36.5 g/dL    RDW 15.0 (H) 11.5 - 14.5 %    PLATELET 739 (L) 433 - 400 K/uL    MPV 10.9 8.9 - 12.9 FL    NRBC 0.0 0  WBC    ABSOLUTE NRBC 0.00 0.00 - 0.01 K/uL    NEUTROPHILS 58 32 - 75 %    LYMPHOCYTES 16 12 - 49 %    MONOCYTES 24 (H) 5 - 13 %    EOSINOPHILS 1 0 - 7 %    BASOPHILS 0 0 - 1 %    IMMATURE GRANULOCYTES 1 (H) 0.0 - 0.5 %    ABS. NEUTROPHILS 3.3 1.8 - 8.0 K/UL    ABS. LYMPHOCYTES 0.9 0.8 - 3.5 K/UL    ABS. MONOCYTES 1.4 (H) 0.0 - 1.0 K/UL    ABS. EOSINOPHILS 0.1 0.0 - 0.4 K/UL    ABS. BASOPHILS 0.0 0.0 - 0.1 K/UL    ABS. IMM. GRANS. 0.1 (H) 0.00 - 0.04 K/UL    DF SMEAR SCANNED      RBC COMMENTS NORMOCYTIC, NORMOCHROMIC     METABOLIC PANEL, COMPREHENSIVE    Collection Time: 09/03/20  9:08 PM   Result Value Ref Range    Sodium 138 136 - 145 mmol/L    Potassium 2.6 (LL) 3.5 - 5.1 mmol/L    Chloride 101 97 - 108 mmol/L    CO2 29 21 - 32 mmol/L    Anion gap 8 5 - 15 mmol/L    Glucose 88 65 - 100 mg/dL    BUN 11 6 - 20 MG/DL    Creatinine 1.14 0.70 - 1.30 MG/DL    BUN/Creatinine ratio 10 (L) 12 - 20      GFR est AA >60 >60 ml/min/1.73m2    GFR est non-AA >60 >60 ml/min/1.73m2    Calcium 10.4 (H) 8.5 - 10.1 MG/DL    Bilirubin, total 0.6 0.2 - 1.0 MG/DL    ALT (SGPT) 18 12 - 78 U/L    AST (SGOT) 22 15 - 37 U/L    Alk.  phosphatase 104 45 - 117 U/L    Protein, total 7.7 6.4 - 8.2 g/dL    Albumin 3.0 (L) 3.5 - 5.0 g/dL    Globulin 4.7 (H) 2.0 - 4.0 g/dL    A-G Ratio 0.6 (L) 1.1 - 2.2     SAMPLES BEING HELD    Collection Time: 09/03/20  9:08 PM   Result Value Ref Range    SAMPLES BEING HELD 1BLUE     COMMENT        Add-on orders for these samples will be processed based on acceptable specimen integrity and analyte stability, which may vary by analyte.    LITHIUM    Collection Time: 09/03/20  9:08 PM   Result Value Ref Range    Lithium level 2.18 (HH) 0.60 - 1.20 MMOL/L    Reported dose date: 20200903      Reported dose time: 1600      Reported dose: 750MG UNITS   MAGNESIUM    Collection Time: 09/03/20  9:08 PM   Result Value Ref Range    Magnesium 3.0 (H) 1.6 - 2.4 mg/dL   LIPASE    Collection Time: 09/03/20  9:08 PM   Result Value Ref Range    Lipase 79 73 - 393 U/L   AMMONIA    Collection Time: 09/03/20  9:08 PM   Result Value Ref Range    Ammonia 17 <32 UMOL/L   TROPONIN I    Collection Time: 09/03/20  9:08 PM   Result Value Ref Range    Troponin-I, Qt. <0.05 <0.05 ng/mL   FIBRINOGEN    Collection Time: 09/03/20  9:08 PM   Result Value Ref Range    Fibrinogen 389 200 - 475 mg/dL   C REACTIVE PROTEIN, QT    Collection Time: 09/03/20  9:08 PM   Result Value Ref Range    C-Reactive protein 3.45 (H) 0.00 - 0.60 mg/dL   D DIMER    Collection Time: 09/03/20  9:08 PM   Result Value Ref Range    D-dimer 1.14 (H) 0.00 - 0.65 mg/L FEU   TROPONIN I    Collection Time: 09/03/20  9:08 PM   Result Value Ref Range    Troponin-I, Qt. <0.05 <0.05 ng/mL   EKG, 12 LEAD, INITIAL    Collection Time: 09/03/20  9:24 PM   Result Value Ref Range    Ventricular Rate 99 BPM    Atrial Rate 99 BPM    P-R Interval 168 ms    QRS Duration 100 ms    Q-T Interval 368 ms    QTC Calculation (Bezet) 472 ms    Calculated P Axis 37 degrees    Calculated R Axis 15 degrees    Calculated T Axis 140 degrees    Diagnosis       Normal sinus rhythm  ST & T wave abnormality, consider anterior ischemia  Prolonged QT  No previous ECGs available       Recent Labs     09/03/20  2108   WBC 5.8   HGB 13.3   HCT 41.9   *     Recent Labs     09/03/20  2108      K 2.6*      CO2 29   GLU 88   BUN 11   CREA 1.14   CA 10.4*   MG 3.0*   ALB 3.0*   TBILI 0.6   ALT 18       Imaging  PA AND LATERAL CHEST RADIOGRAPHS: 9/3/2020 10:49 PM     INDICATION: Poor appetite, tachycardia, nonverbal.     COMPARISON: None.     TECHNIQUE: Frontal and lateral radiographs of the chest.     FINDINGS:  The lungs are hypoinflated, but clear. The central airways are patent. The heart  size is normal. No pneumothorax and no large pleural effusion.     IMPRESSION:   Shallow volumes but clear lungs. XR ABD (KUB)     INDICATION: Poor appetite, non-verbal     COMPARISON: None.     FINDINGS: A supine radiograph of the abdomen shows marked constipation. No soft  tissue masses or pathologic calcifications are identified. Bilateral hip  dysplasia and arthritis is seen.       IMPRESSION: Severe constipation     Assessment and Plan   1. Lithium toxicity       - critical level       - admit to telemetry       - per poison control recommendations, will place order to recheck lithium level q 4 hours and continue IV fluids at 250 ml/hr pending lithium correction       - check Depakote level       - check ammonia, acetaminophen, and salicylate levels as well      2. Altered mental status        - plan as above; continue neurovascular checks and fall precautions    3. Acute hypokalemia       - order K repeat level after K replacement; continue telemetry monitoring    4. Cognitive impairment with psychosis unspecified       - as patient is now awake, if he passes his dysphagia screen, then restart other home medications except for Lithium    VTE prophylaxis.   SCDs to BLEs         Signed by: Priyank Rahman MD    September 4, 2020 at 1:24 AM

## 2020-09-04 NOTE — PROGRESS NOTES
Problem: Risk for Spread of Infection  Goal: Prevent transmission of infectious organism to others  Description: Prevent the transmission of infectious organisms to other patients, staff members, and visitors. Outcome: Progressing Towards Goal     Problem: Patient Education:  Go to Education Activity  Goal: Patient/Family Education  9/4/2020 1113 by Chyrel Cranker, RN  Outcome: Progressing Towards Goal  9/4/2020 1111 by Chyrel Cranker, RN  Outcome: Progressing Towards Goal     Problem: Pressure Injury - Risk of  Goal: *Prevention of pressure injury  Description: Document Ajay Scale and appropriate interventions in the flowsheet. Outcome: Progressing Towards Goal  Note: Pressure Injury Interventions:  Sensory Interventions: Assess need for specialty bed, Avoid rigorous massage over bony prominences, Discuss PT/OT consult with provider, Float heels, Keep linens dry and wrinkle-free, Minimize linen layers, Monitor skin under medical devices, Pad between skin to skin    Moisture Interventions: Apply protective barrier, creams and emollients, Assess need for specialty bed, Check for incontinence Q2 hours and as needed, Limit adult briefs, Maintain skin hydration (lotion/cream), Minimize layers, Moisture barrier    Activity Interventions: Assess need for specialty bed, Increase time out of bed, PT/OT evaluation    Mobility Interventions: Assess need for specialty bed, PT/OT evaluation, Turn and reposition approx.  every two hours(pillow and wedges)    Nutrition Interventions: Document food/fluid/supplement intake, Discuss nutritional consult with provider    Friction and Shear Interventions: Apply protective barrier, creams and emollients, Lift sheet, Lift team/patient mobility team, Transfer aides:transfer board/Quin lift/ceiling lift, Transferring/repositioning devices  Problem: Falls - Risk of  Goal: *Absence of Falls  Description: Document Antonio Silva Fall Risk and appropriate interventions in the flowsheet. Outcome: Progressing Towards Goal  Note: Fall Risk Interventions:       Mentation Interventions: Adequate sleep, hydration, pain control, Door open when patient unattended, Familiar objects from home, Increase mobility, More frequent rounding, Toileting rounds    Medication Interventions: Assess postural VS orthostatic hypotension, Evaluate medications/consider consulting pharmacy, Patient to call before getting OOB    Elimination Interventions: Call light in reach, Patient to call for help with toileting needs, Stay With Me (per policy), Toilet paper/wipes in reach     Bedside and Verbal shift change report given to MIGDALIA Swift (oncoming nurse) by Donal Cerrato (offgoing nurse). Report included the following information SBAR, Kardex, MAR, Recent Results and Cardiac Rhythm NSR.      Patient Vitals for the past 12 hrs:   Temp Pulse Resp BP SpO2   09/04/20 1546 97.8 °F (36.6 °C) 72 14 143/85 100 %   09/04/20 1322     97 %   09/04/20 1100 98.5 °F (36.9 °C) 82 16 122/59 95 %   09/04/20 0927  90  152/84 99 %   09/04/20 0714 98.2 °F (36.8 °C) 82 22 130/88 100 %   09/04/20 0621 98.4 °F (36.9 °C) 85 24 112/80 98 %

## 2020-09-04 NOTE — PROGRESS NOTES
[facility] Adult  Hospitalist Group                                                                                          Hospitalist Progress Note  Jose Berger MD  Answering service: [phone] from in house phone        Date of Service:  2020  NAME:  Theo Sweet  :  1990  MRN:  [id number]      Admission Summary:     Patient presented to the ED from adult long-term with reported altered mental status. Per report, patient is mostly non-verbal (with exception of incomprehensible speech) at baseline. Patient says some names and \"bye\" on occasion. Patient reportedly was not acting like himself and his mentation and behavior was noticeably altered. Per report, he has been drowsy of the past 2-3 weeks with decreased appetite, ataxia, and tremors. On arrival to the ED tonight, workup included labs including lithium level = 2.18.  K = 2.6. Patient was given KCL 40 mEq po and KCL 10 mEq IV run x 1. Patient notedly usually takes Lithium 50 mg po BID with his last dose given on 2020. ED requested admission to the hospitalist service at which time, I requested consultation with poison consult. Recommendations were to recheck lithium level q 4 hours and IV fluid infusion at 250 ml/hr. There were no reports of recent falls, head or neck trauma. Interval history / Subjective:       Patient has baseline mental retardation and cannot be communicated.      Assessment & Plan:     Lithium toxicity  -Holding lithium, IV fluid per recommendation from poison control  -Monitor lithium level every 4 hours    Altered mental status  -Secondary to lithium toxicity and electrolyte derangements  -Continue monitor    Hypertension  -Continue clonidine    History of psychosis  -On multiple psych medications, resume home meds  -Holding lithium    Obesity  -Patient follow-up for weight management    Code status: FULL  DVT prophylaxis: Lovenox    Care Plan discussed with: Patient/Family  Anticipated Disposition: SNF/LTC  Anticipated Discharge: 24 hours to 48 hours     Hospital Problems  Date Reviewed: 1/28/2020          Codes Class Noted POA    Altered mental status ICD-10-CM: R41.82  ICD-9-CM: 780.97  9/4/2020 Unknown        Lithium toxicity ICD-10-CM: Z78.810W  ICD-9-CM: 985.8, E980.9  9/4/2020 Unknown                Review of Systems:   A comprehensive review of systems was negative except for that written in the HPI. Vital Signs:    Last 24hrs VS reviewed since prior progress note. Most recent are:  Visit Vitals  /59 (BP 1 Location: Right arm, BP Patient Position: At rest)   Pulse 82   Temp 98.5 °F (36.9 °C)   Resp 16   Ht 6' 3\" (1.905 m)   Wt 121.4 kg (267 lb 11.2 oz)   SpO2 97%   BMI 33.46 kg/m²         Intake/Output Summary (Last 24 hours) at 9/4/2020 1353  Last data filed at 9/4/2020 4680  Gross per 24 hour   Intake 1887 ml   Output    Net 1887 ml        Physical Examination:             Constitutional:  No acute distress   ENT:  Oral mucosa moist, oropharynx benign. Resp:  CTA bilaterally. No wheezing/rhonchi/rales. No accessory muscle use   CV:  Regular rhythm, normal rate, no murmurs, gallops, rubs    GI:  Soft, non distended, non tender. normoactive bowel sounds, no hepatosplenomegaly     Musculoskeletal:  No edema, warm, 2+ pulses throughout    Neurologic:  Moves all extremities.   Awake and alert     Skin:  Good turgor, no rashes or ulcers       Data Review:    Review and/or order of clinical lab test      Labs:     Recent Labs     09/04/20 0407 09/03/20 2108   WBC 5.4 5.8   HGB 11.5* 13.3   HCT 36.9 41.9   * 146*     Recent Labs     09/04/20 0359 09/03/20 2108    138   K 2.7* 2.6*    101   CO2 28 29   BUN 9 11   CREA 0.85 1.14   GLU 91 88   CA 9.5 10.4*   MG  --  3.0*     Recent Labs     09/04/20 0359 09/03/20 2108   ALT 14 18   AP 86 104   TBILI 0.5 0.6   TP 6.3* 7.7   ALB 2.6* 3.0*   GLOB 3.7 4.7*   LPSE  --  79     No results for input(s): INR, PTP, APTT, INREXT in the last 72 hours. No results for input(s): FE, TIBC, PSAT, FERR in the last 72 hours. No results found for: FOL, RBCF   No results for input(s): PH, PCO2, PO2 in the last 72 hours.   Recent Labs     09/03/20  2108   TROIQ <0.05  <0.05     Lab Results   Component Value Date/Time    Cholesterol, total 157 11/21/2019 10:30 AM    HDL Cholesterol 44 11/21/2019 10:30 AM    LDL, calculated 97 11/21/2019 10:30 AM    Triglyceride 79 11/21/2019 10:30 AM     No results found for: GLUCPOC  No results found for: COLOR, APPRN, SPGRU, REFSG, KLUDEEP, PROTU, GLUCU, KETU, BILU, UROU, ONIEL, LEUKU, GLUKE, EPSU, BACTU, WBCU, RBCU, CASTS, UCRY      Medications Reviewed:     Current Facility-Administered Medications   Medication Dose Route Frequency    enoxaparin (LOVENOX) injection 30 mg  30 mg SubCUTAneous Q12H    acetaminophen (TYLENOL) tablet 650 mg  650 mg Oral Q6H PRN    Or    acetaminophen (TYLENOL) suppository 650 mg  650 mg Rectal Q6H PRN    0.9% sodium chloride infusion  250 mL/hr IntraVENous CONTINUOUS    cloNIDine HCL (CATAPRES) tablet 0.1 mg  0.1 mg Oral BID    aspirin delayed-release tablet 81 mg  81 mg Oral DAILY    topiramate (TOPAMAX) tablet 100 mg  100 mg Oral BID    LORazepam (ATIVAN) tablet 1 mg  1 mg Oral BID PRN    QUEtiapine (SEROquel) tablet 200 mg  200 mg Oral TID    [START ON 9/5/2020] lactulose (CHRONULAC) 10 gram/15 mL solution 45 mL  45 mL Oral DAILY    divalproex DR (DEPAKOTE) tablet 1,000 mg  1,000 mg Oral TID    risperiDONE (RisperDAL) tablet 4 mg  4 mg Oral QHS    [START ON 9/5/2020] risperiDONE (RisperDAL) tablet 2 mg  2 mg Oral DAILY    diphenhydrAMINE (BENADRYL) capsule 50 mg  50 mg Oral QHS    lactulose (CHRONULAC) 10 gram/15 mL solution 30 mL  30 mL Oral QHS    melatonin tablet 4.5 mg  4.5 mg Oral QHS    potassium chloride 10 mEq in 50 ml IVPB  10 mEq IntraVENous Q1H ______________________________________________________________________  EXPECTED LENGTH OF STAY: - - -  ACTUAL LENGTH OF STAY:          0                 Anjelica Solano MD

## 2020-09-04 NOTE — PROGRESS NOTES
9/4/2020; 09:15 -   VENECIA:  - RUR: 13%  - Disposition is TBD    - IVF Continues  - Lithium Checks q 4 hours  - Speech Eval pending for dysphagia screening    CM attempted to contact all listed phone numbers for patient and emergency contacts. None of the on file numbers are currently working. CM performed chart review and phone a phone number of [phone] for listed emergency contact Blanca Mcbride. CM attempted to reach [facility] and left  requesting a return call. Patient is not appropriate for assessment process due to baseline mental handicaps. CRM: Martell Buerger, MPH, [facility]; Z: [phone]    10:00 -   CM received a return call from [facility]. Patient is no longer in the group home that was managed by [facility]; patient has not been with [facility] in 2 years. Per nursing, patient's caregiver is Kim Andrews: [phone]. CM attempted to contact Kim Andrews and left a  requesting at return call. CRM: Martell Buerger, MPH, CHES; Z: [phone]    15:25 -   TRANSITIONS OF CARE PLAN:   1. DESTINATION: [facility]  2. TRANSPORT: [facility]  3. [place] [address] [facility]  4. DME: None  5. HOME HEALTH: TBD  6. CODE STATUS/AMD STATUS: Full Code  7. FOLLOW UP APPOINTMENTS: PCP  8.  STILL NEEDS: IVF, Lithium Checks q4 hours,     Reason for Admission:   Lithium Toxicity, AMS                   RUR Score:     13%                Plan for utilizing home health:      TBD    PCP: First and Last name:  Twan Canada   Name of Practice: [facility]   Are you a current patient: Yes/No: Yes   Approximate date of last visit: 1/28, one is scheduled for 9/18   Can you participate in a virtual visit with your PCP: no                    Current Advanced Directive/Advance Care Plan: Full Code; Guardianship paperwork received; ACP Completed                         Transition of Care Plan:          CM spoke with patient's  Sharri Gosselin: [phone]) and legal guardian Velvet Candida: [phone]) by phone. Patient is primarily independent in ADLs. Patient has been a resident of [address] [place] [address] for approximately 1 year. Pharmacy preference is [address] Patient's group home is 2 story, 2nd floor bedroom, 3 exterior steps, 16 interior steps. Patient has 24/7 support at home. At baseline, patient is primarily non-verbal and mentally handicapped. Care Management Interventions  PCP Verified by CM: Yes(NP Wyatt Smith)  Last Visit to PCP: 01/28/20  Palliative Care Criteria Met (RRAT>21 & CHF Dx)?: No  Mode of Transport at Discharge: Other (see comment)(DSP from group home to transport)  Transition of Care Consult (CM Consult): Discharge Planning  MyChart Signup: No  Discharge Durable Medical Equipment: No(none)  Health Maintenance Reviewed: Yes(CM spoke with patient's  and guardian)  Physical Therapy Consult: No  Occupational Therapy Consult: No  Speech Therapy Consult: No  Current Support Network:  Adult Group Home([facility])  Confirm Follow Up Transport: Other (see comment)(group home)  The Plan for Transition of Care is Related to the Following Treatment Goals : Return to [address] [place] [address]  The Patient and/or Patient Representative was Provided with a Choice of Provider and Agrees with the Discharge Plan?: Yes  Name of the Patient Representative Who was Provided with a Choice of Provider and Agrees with the Discharge Plan: legal guardianRashid Tangela of Choice List was Provided with Basic Dialogue that Supports the Patient's Individualized Plan of Care/Goals, Treatment Preferences and Shares the Quality Data Associated with the Providers?: Yes   Resource Information Provided?: No  Discharge Location  Discharge Placement: Unable to determine at this time(lives in an adult group home: 2 story, 3 exterior steps, 2nd floor bedroom, 16 interior steps)  CRM: Rome Tripathi, MPH, [address] [place] [address]; Z: [phone]

## 2020-09-04 NOTE — PROGRESS NOTES
Admission Medication Reconciliation:    Information obtained from:  Transfer papers from [address]:  YES    Comments/Recommendations: Updated PTA meds/reviewed patient's allergies. 1)  All information obtained from transfer papers from [address]    2)  Medication changes (since last review): Added  - melatonin 5 mg QHS  -lactulose 30 mL QHS (in addition to 45 mL daily)  -risperidone 4 mg QHS (in addition to 2 mg daily)  -lithium carbonate  mg and 450 mg - takes total of 750 mg QHS    Adjusted  - seroquel to TID (from BID)  -ativan to PRN  -divalproex sodium to 1000 mg TID (from 500 mg BID)    Removed  - ziprasidone  -simvastatin       ¹[employer] pharmacy benefit data reflects medications filled and processed through the patient's insurance, however   this data does NOT capture whether the medication was picked up or is currently being taken by the patient. Allergies:  Patient has no known allergies. Significant PMH/Disease States:   Past Medical History:   Diagnosis Date    Hyperlipidemia     Mental retardation     Obesity (BMI 30-39. 9)     Type II or unspecified type diabetes mellitus without mention of complication, not stated as uncontrolled      Chief Complaint for this Admission:    Chief Complaint   Patient presents with    Extremity Weakness     Prior to Admission Medications:   Prior to Admission Medications   Prescriptions Last Dose Informant Taking? LORazepam (ATIVAN) 1 mg tablet   Yes   Sig: Take 1 mg by mouth two (2) times daily as needed. QUEtiapine (SEROQUEL) 200 mg tablet   Yes   Sig: Take 200 mg by mouth three (3) times daily. aspirin delayed-release 81 mg tablet   Yes   Sig: TAKE 1 TABLET DAILY. cloNIDine HCl (CATAPRES) 0.1 mg tablet   Yes   Sig: TAKE 1 TABLET BY MOUTH TWICE A DAY   diphenhydrAMINE (BANOPHEN) 50 mg capsule   Yes   Sig: Take 50 mg by mouth nightly.    divalproex DR (Depakote) 500 mg tablet   Yes   Sig: Take 1,000 mg by mouth three (3) times daily. hydrOXYzine pamoate (VISTARIL) 25 mg capsule   No   hydroCHLOROthiazide (HYDRODIURIL) 12.5 mg tablet   Yes   Sig: TAKE (1) TABLET BY MOUTH DAILY FOR HIGH BLOOD PRESSURE.   lactulose (CHRONULAC) 10 gram/15 mL solution   Yes   Sig: Take 45 mL by mouth daily. lactulose (CHRONULAC) 10 gram/15 mL solution   Yes   Sig: Take 30 mL by mouth nightly. lithium carbonate CR (ESKALITH CR) 450 mg CR tablet   Yes   Sig: Take 450 mg by mouth nightly. Takes with 300 mg tab for total of 750 mg   lithium carbonate SR (LITHOBID) 300 mg CR tablet   Yes   Sig: Take 300 mg by mouth nightly. Takes with 450 mg tab for total of 750 mg   melatonin 5 mg tablet   Yes   Sig: Take 5 mg by mouth nightly. potassium chloride SR (KLOR-CON 10) 10 mEq tablet   Yes   Sig: TAKE 1 TABLET BY MOUTH DAILY   risperiDONE (RisperDAL) 2 mg tablet   Yes   Sig: Take 2 mg by mouth daily. risperiDONE (RisperDAL) 4 mg tablet   Yes   Sig: Take 4 mg by mouth nightly. topiramate (TOPAMAX) 100 mg tablet   Yes   Sig: Take 100 mg by mouth two (2) times a day. traZODone (DESYREL) 100 mg tablet   No   Sig: Take 100 mg by mouth nightly. Facility-Administered Medications: None       Please contact the main inpatient pharmacy with any questions or concerns at [phone] and we will direct you to the clinical pharmacist covering this patient's care while in-house.    Chinedu Alvarez, PIETROD

## 2020-09-04 NOTE — PROGRESS NOTES
Bedside shift change report given to Yaneth (oncoming nurse) by Ashwini Negrete (offgoing nurse). Report included the following information SBAR, Kardex, MAR, Recent Results, Med Rec Status and Cardiac Rhythm NSR. Problem: Risk for Spread of Infection  Goal: Prevent transmission of infectious organism to others  Outcome: Progressing Towards Goal  Note- droplet plus precautions in place. Staff wearing appropriate PPE    Pt has lithium toxicity.  q4 lithium checks

## 2020-09-04 NOTE — ROUTINE PROCESS
TRANSFER - OUT REPORT: 
 
Verbal report given to Guillermo Kingsley RN(name) on [facility]  being transferred to [facility]) for routine progression of care Report consisted of patients Situation, Background, Assessment and  
Recommendations(SBAR). Information from the following report(s) SBAR, ED Summary, [facility] and Recent Results was reviewed with the receiving nurse. Lines:  
Peripheral IV 09/03/20 Left Antecubital (Active) Opportunity for questions and clarification was provided. Patient transported with: 
 Registered Nurse

## 2020-09-05 LAB
ALBUMIN SERPL-MCNC: 2.3 G/DL (ref 3.5–5)
ALBUMIN/GLOB SERPL: 0.6 {RATIO} (ref 1.1–2.2)
ALP SERPL-CCNC: 91 U/L (ref 45–117)
ALT SERPL-CCNC: 14 U/L (ref 12–78)
ANION GAP SERPL CALC-SCNC: 5 MMOL/L (ref 5–15)
AST SERPL-CCNC: 23 U/L (ref 15–37)
BASOPHILS # BLD: 0 K/UL (ref 0–0.1)
BASOPHILS NFR BLD: 1 % (ref 0–1)
BILIRUB SERPL-MCNC: 0.4 MG/DL (ref 0.2–1)
BUN SERPL-MCNC: 6 MG/DL (ref 6–20)
BUN/CREAT SERPL: 10 (ref 12–20)
CALCIUM SERPL-MCNC: 9.4 MG/DL (ref 8.5–10.1)
CHLORIDE SERPL-SCNC: 113 MMOL/L (ref 97–108)
CO2 SERPL-SCNC: 26 MMOL/L (ref 21–32)
CREAT SERPL-MCNC: 0.61 MG/DL (ref 0.7–1.3)
DATE LAST DOSE: ABNORMAL
DATE LAST DOSE: ABNORMAL
DIFFERENTIAL METHOD BLD: ABNORMAL
EOSINOPHIL # BLD: 0.1 K/UL (ref 0–0.4)
EOSINOPHIL NFR BLD: 3 % (ref 0–7)
ERYTHROCYTE [DISTWIDTH] IN BLOOD BY AUTOMATED COUNT: 16 % (ref 11.5–14.5)
GLOBULIN SER CALC-MCNC: 3.7 G/DL (ref 2–4)
GLUCOSE SERPL-MCNC: 78 MG/DL (ref 65–100)
HCT VFR BLD AUTO: 36 % (ref 36.6–50.3)
HGB BLD-MCNC: 11.1 G/DL (ref 12.1–17)
IMM GRANULOCYTES # BLD AUTO: 0 K/UL (ref 0–0.04)
IMM GRANULOCYTES NFR BLD AUTO: 1 % (ref 0–0.5)
LITHIUM SERPL-SCNC: 1.84 MMOL/L (ref 0.6–1.2)
LITHIUM SERPL-SCNC: 1.9 MMOL/L (ref 0.6–1.2)
LYMPHOCYTES # BLD: 1.2 K/UL (ref 0.8–3.5)
LYMPHOCYTES NFR BLD: 29 % (ref 12–49)
MAGNESIUM SERPL-MCNC: 2.8 MG/DL (ref 1.6–2.4)
MCH RBC QN AUTO: 30.8 PG (ref 26–34)
MCHC RBC AUTO-ENTMCNC: 30.8 G/DL (ref 30–36.5)
MCV RBC AUTO: 100 FL (ref 80–99)
MONOCYTES # BLD: 0.7 K/UL (ref 0–1)
MONOCYTES NFR BLD: 17 % (ref 5–13)
NEUTS SEG # BLD: 2.2 K/UL (ref 1.8–8)
NEUTS SEG NFR BLD: 51 % (ref 32–75)
NRBC # BLD: 0 K/UL (ref 0–0.01)
NRBC BLD-RTO: 0 PER 100 WBC
PLATELET # BLD AUTO: 121 K/UL (ref 150–400)
PMV BLD AUTO: 11.5 FL (ref 8.9–12.9)
POTASSIUM SERPL-SCNC: 3.2 MMOL/L (ref 3.5–5.1)
PROT SERPL-MCNC: 6 G/DL (ref 6.4–8.2)
RBC # BLD AUTO: 3.6 M/UL (ref 4.1–5.7)
REPORTED DOSE,DOSE: ABNORMAL UNITS
REPORTED DOSE,DOSE: ABNORMAL UNITS
REPORTED DOSE/TIME,TMG: ABNORMAL
REPORTED DOSE/TIME,TMG: ABNORMAL
SODIUM SERPL-SCNC: 144 MMOL/L (ref 136–145)
WBC # BLD AUTO: 4.3 K/UL (ref 4.1–11.1)

## 2020-09-05 PROCEDURE — 36415 COLL VENOUS BLD VENIPUNCTURE: CPT

## 2020-09-05 PROCEDURE — 74011250637 HC RX REV CODE- 250/637: Performed by: FAMILY MEDICINE

## 2020-09-05 PROCEDURE — 74011250636 HC RX REV CODE- 250/636: Performed by: NURSE PRACTITIONER

## 2020-09-05 PROCEDURE — 80053 COMPREHEN METABOLIC PANEL: CPT

## 2020-09-05 PROCEDURE — 80178 ASSAY OF LITHIUM: CPT

## 2020-09-05 PROCEDURE — 65660000000 HC RM CCU STEPDOWN

## 2020-09-05 PROCEDURE — 85025 COMPLETE CBC W/AUTO DIFF WBC: CPT

## 2020-09-05 PROCEDURE — 83735 ASSAY OF MAGNESIUM: CPT

## 2020-09-05 PROCEDURE — 74011250636 HC RX REV CODE- 250/636: Performed by: FAMILY MEDICINE

## 2020-09-05 RX ORDER — POTASSIUM CHLORIDE 750 MG/1
20 TABLET, FILM COATED, EXTENDED RELEASE ORAL 2 TIMES DAILY
Status: COMPLETED | OUTPATIENT
Start: 2020-09-05 | End: 2020-09-06

## 2020-09-05 RX ADMIN — ASPIRIN 81 MG: 81 TABLET, COATED ORAL at 08:32

## 2020-09-05 RX ADMIN — DIVALPROEX SODIUM 1000 MG: 500 TABLET, DELAYED RELEASE ORAL at 21:43

## 2020-09-05 RX ADMIN — QUETIAPINE FUMARATE 200 MG: 100 TABLET ORAL at 21:43

## 2020-09-05 RX ADMIN — DIPHENHYDRAMINE HYDROCHLORIDE 50 MG: 25 CAPSULE ORAL at 21:42

## 2020-09-05 RX ADMIN — RISPERIDONE 2 MG: 1 TABLET ORAL at 08:32

## 2020-09-05 RX ADMIN — QUETIAPINE FUMARATE 200 MG: 100 TABLET ORAL at 17:16

## 2020-09-05 RX ADMIN — POTASSIUM CHLORIDE 20 MEQ: 750 TABLET, FILM COATED, EXTENDED RELEASE ORAL at 17:17

## 2020-09-05 RX ADMIN — TOPIRAMATE 100 MG: 100 TABLET, FILM COATED ORAL at 17:17

## 2020-09-05 RX ADMIN — SODIUM CHLORIDE 250 ML/HR: 900 INJECTION, SOLUTION INTRAVENOUS at 08:31

## 2020-09-05 RX ADMIN — DIVALPROEX SODIUM 1000 MG: 500 TABLET, DELAYED RELEASE ORAL at 17:17

## 2020-09-05 RX ADMIN — MELATONIN 4.5 MG: at 22:02

## 2020-09-05 RX ADMIN — TOPIRAMATE 100 MG: 100 TABLET, FILM COATED ORAL at 08:33

## 2020-09-05 RX ADMIN — SODIUM CHLORIDE 250 ML/HR: 900 INJECTION, SOLUTION INTRAVENOUS at 03:41

## 2020-09-05 RX ADMIN — ENOXAPARIN SODIUM 40 MG: 40 INJECTION SUBCUTANEOUS at 12:15

## 2020-09-05 RX ADMIN — LACTULOSE 45 ML: 20 SOLUTION ORAL at 08:26

## 2020-09-05 RX ADMIN — SODIUM CHLORIDE 250 ML/HR: 900 INJECTION, SOLUTION INTRAVENOUS at 21:42

## 2020-09-05 RX ADMIN — SODIUM CHLORIDE 250 ML/HR: 900 INJECTION, SOLUTION INTRAVENOUS at 16:19

## 2020-09-05 RX ADMIN — LACTULOSE 30 ML: 20 SOLUTION ORAL at 21:43

## 2020-09-05 RX ADMIN — QUETIAPINE FUMARATE 200 MG: 100 TABLET ORAL at 08:33

## 2020-09-05 RX ADMIN — DIVALPROEX SODIUM 1000 MG: 500 TABLET, DELAYED RELEASE ORAL at 08:26

## 2020-09-05 RX ADMIN — CLONIDINE HYDROCHLORIDE 0.1 MG: 0.1 TABLET ORAL at 08:32

## 2020-09-05 RX ADMIN — CLONIDINE HYDROCHLORIDE 0.1 MG: 0.1 TABLET ORAL at 17:17

## 2020-09-05 RX ADMIN — RISPERIDONE 4 MG: 3 TABLET ORAL at 22:02

## 2020-09-05 NOTE — PROGRESS NOTES
17:25 TRANSFER - OUT REPORT:    Verbal report given to MIGDALIA gonzalez(name) on [facility]arnsworth  being transferred to [facility] (unit) for routine progression of care       Report consisted of patients Situation, Background, Assessment and   Recommendations(SBAR). Information from the following report(s) SBAR, Kardex, MAR, Recent Results and Cardiac Rhythm NSR was reviewed with the receiving nurse. Lines:   Peripheral IV 09/03/20 Left Antecubital (Active)   Site Assessment Clean, dry, & intact 09/05/20 1621   Phlebitis Assessment 0 09/05/20 1621   Infiltration Assessment 0 09/05/20 1621   Dressing Status Clean, dry, & intact 09/05/20 1621   Dressing Type Tape;Transparent 09/05/20 1621   Hub Color/Line Status Pink; Infusing 09/05/20 1621   Action Taken Open ports on tubing capped 09/05/20 1621   Alcohol Cap Used Yes 09/05/20 1621        Opportunity for questions and clarification was provided. Patient transported with:   [facility]     Patient Vitals for the past 12 hrs:   Temp Pulse Resp BP SpO2   09/05/20 1716  91  (!) 164/94    09/05/20 1621 (P) 97.6 °F (36.4 °C) 92 24 149/78 99 %   09/05/20 1249     99 %   09/05/20 1119 98.9 °F (37.2 °C) (!) 106 25 (!) 166/93 100 %   09/05/20 0832  98  (!) 160/96 99 %   09/05/20 0714 98.1 °F (36.7 °C) 87 12 145/75 100 %             Problem: Risk for Spread of Infection  Goal: Prevent transmission of infectious organism to others  Description: Prevent the transmission of infectious organisms to other patients, staff members, and visitors. Outcome: Progressing Towards Goal     Problem: Pressure Injury - Risk of  Goal: *Prevention of pressure injury  Description: Document Ajay Scale and appropriate interventions in the flowsheet.   Outcome: Progressing Towards Goal  Note: Pressure Injury Interventions:  Sensory Interventions: Assess need for specialty bed, Assess changes in LOC, Check visual cues for pain, Discuss PT/OT consult with provider, Float heels, Keep linens dry and wrinkle-free, Monitor skin under medical devices, Pad between skin to skin, Turn and reposition approx. every two hours (pillows and wedges if needed)    Moisture Interventions: Apply protective barrier, creams and emollients, Assess need for specialty bed, Check for incontinence Q2 hours and as needed, Limit adult briefs, Maintain skin hydration (lotion/cream), Minimize layers, Offer toileting Q_hr    Activity Interventions: Assess need for specialty bed, Increase time out of bed, PT/OT evaluation    Mobility Interventions: Assess need for specialty bed, Float heels, PT/OT evaluation, Turn and reposition approx. every two hours(pillow and wedges)    Nutrition Interventions: Document food/fluid/supplement intake    Friction and Shear Interventions: Apply protective barrier, creams and emollients, Feet elevated on foot rest, Lift sheet, Lift team/patient mobility team, Minimize layers  Problem: Falls - Risk of  Goal: *Absence of Falls  Description: Document Faisal Fall Risk and appropriate interventions in the flowsheet.   Outcome: Progressing Towards Goal  Note: Fall Risk Interventions:  Mobility Interventions: Communicate number of staff needed for ambulation/transfer, OT consult for ADLs, Patient to call before getting OOB, PT Consult for mobility concerns    Mentation Interventions: Adequate sleep, hydration, pain control, Door open when patient unattended, Eyeglasses and hearing aids, Familiar objects from home, More frequent rounding, Reorient patient, Room close to nurse's station    Medication Interventions: Assess postural VS orthostatic hypotension, Evaluate medications/consider consulting pharmacy, Patient to call before getting OOB    Elimination Interventions: Call light in reach, Elevated toilet seat, Stay With Me (per policy), Toilet paper/wipes in reach  Problem: Patient Education: Go to Patient Education Activity  Goal: Patient/Family Education  Outcome: Progressing Towards Goal

## 2020-09-05 NOTE — PROGRESS NOTES
Problem: Risk for Spread of Infection  Goal: Prevent transmission of infectious organism to others  Description: Prevent the transmission of infectious organisms to other patients, staff members, and visitors. Outcome: Progressing Towards Goal     Problem: Pressure Injury - Risk of  Goal: *Prevention of pressure injury  Description: Document Ajay Scale and appropriate interventions in the flowsheet. Outcome: Progressing Towards Goal  Note: Pressure Injury Interventions:  Sensory Interventions: Assess changes in LOC    Moisture Interventions: Apply protective barrier, creams and emollients    Activity Interventions: Pressure redistribution bed/mattress(bed type)    Mobility Interventions: Assess need for specialty bed    Nutrition Interventions: Document food/fluid/supplement intake    Friction and Shear Interventions: Apply protective barrier, creams and emollients                Problem: Falls - Risk of  Goal: *Absence of Falls  Description: Document Faisal Fall Risk and appropriate interventions in the flowsheet.   Outcome: Progressing Towards Goal  Note: Fall Risk Interventions:  Mobility Interventions: Communicate number of staff needed for ambulation/transfer    Mentation Interventions: Adequate sleep, hydration, pain control    Medication Interventions: Evaluate medications/consider consulting pharmacy    Elimination Interventions: Call light in reach              Problem: General Medical Care Plan  Goal: *Vital signs within specified parameters  Outcome: Progressing Towards Goal  Goal: *Labs within defined limits  Outcome: Progressing Towards Goal  Goal: *Absence of infection signs and symptoms  Outcome: Progressing Towards Goal  Goal: *Optimal pain control at patient's stated goal  Outcome: Progressing Towards Goal  Goal: *Skin integrity maintained  Outcome: Progressing Towards Goal  Goal: *Fluid volume balance  Outcome: Progressing Towards Goal  Goal: *Optimize nutritional status  Outcome: Progressing Towards Goal  Goal: *Anxiety reduced or absent  Outcome: Progressing Towards Goal  Goal: *Progressive mobility and function (eg: ADL's)  Outcome: Not Progressing Towards Goal

## 2020-09-05 NOTE — PROGRESS NOTES
Bedside shift change report given to MIGDALIA Wolfe by Radha Lee RN . Report included the following information SBAR, Kardex, ED Summary, Intake/Output, MAR, and Recent Results. Pt disoriented, Normal Sinus Rhythm , room air, Pain none. No acute events overnight. Last 3 Recorded Weights in this Encounter    09/04/20 0621 09/05/20 0339   Weight: 121.4 kg (267 lb 11.2 oz) 125.7 kg (277 lb 3.2 oz)   Pt weighed in bed with one sheet, blanket, and 2 pillows. Weight variance noted      Problem: Pressure Injury - Risk of  Goal: *Prevention of pressure injury  Description: Document Ajay Scale and appropriate interventions in the flowsheet. Outcome: Progressing Towards Goal  Note: Pressure Injury Interventions:  Sensory Interventions: Assess changes in LOC, Check visual cues for pain, Float heels, Keep linens dry and wrinkle-free, Minimize linen layers    Moisture Interventions: Absorbent underpads, Check for incontinence Q2 hours and as needed, Internal/External urinary devices    Activity Interventions: Assess need for specialty bed, Increase time out of bed    Mobility Interventions: Float heels, HOB 30 degrees or less    Nutrition Interventions: Document food/fluid/supplement intake    Friction and Shear Interventions: Apply protective barrier, creams and emollients, Lift sheet, Lift team/patient mobility team, Transfer aides:transfer board/Quin lift/ceiling lift, Transferring/repositioning devices                Problem: Falls - Risk of  Goal: *Absence of Falls  Description: Document Faisal Fall Risk and appropriate interventions in the flowsheet.   Outcome: Progressing Towards Goal  Note: Fall Risk Interventions:  Mobility Interventions: Communicate number of staff needed for ambulation/transfer, Patient to call before getting OOB    Mentation Interventions: Adequate sleep, hydration, pain control, Update white board    Medication Interventions: Evaluate medications/consider consulting pharmacy, Patient to call before getting OOB    Elimination Interventions: Call light in reach

## 2020-09-05 NOTE — PROGRESS NOTES
Bedside shift change report given to MIGDALIA Painting (oncoming nurse) by Adelia Nice RN (offgoing nurse). Report included the following information SBAR, Procedure Summary, Intake/Output, MAR, Recent Results and Cardiac Rhythm NSR.

## 2020-09-06 LAB
ANION GAP SERPL CALC-SCNC: 3 MMOL/L (ref 5–15)
BUN SERPL-MCNC: 4 MG/DL (ref 6–20)
BUN/CREAT SERPL: 5 (ref 12–20)
CALCIUM SERPL-MCNC: 9 MG/DL (ref 8.5–10.1)
CHLORIDE SERPL-SCNC: 114 MMOL/L (ref 97–108)
CO2 SERPL-SCNC: 25 MMOL/L (ref 21–32)
CREAT SERPL-MCNC: 0.77 MG/DL (ref 0.7–1.3)
DATE LAST DOSE: ABNORMAL
GLUCOSE SERPL-MCNC: 100 MG/DL (ref 65–100)
LITHIUM SERPL-SCNC: 1.47 MMOL/L (ref 0.6–1.2)
POTASSIUM SERPL-SCNC: 2.9 MMOL/L (ref 3.5–5.1)
REPORTED DOSE,DOSE: ABNORMAL UNITS
REPORTED DOSE/TIME,TMG: ABNORMAL
SODIUM SERPL-SCNC: 142 MMOL/L (ref 136–145)

## 2020-09-06 PROCEDURE — 80178 ASSAY OF LITHIUM: CPT

## 2020-09-06 PROCEDURE — 74011250636 HC RX REV CODE- 250/636: Performed by: NURSE PRACTITIONER

## 2020-09-06 PROCEDURE — 36415 COLL VENOUS BLD VENIPUNCTURE: CPT

## 2020-09-06 PROCEDURE — 74011250637 HC RX REV CODE- 250/637: Performed by: FAMILY MEDICINE

## 2020-09-06 PROCEDURE — 74011250636 HC RX REV CODE- 250/636: Performed by: FAMILY MEDICINE

## 2020-09-06 PROCEDURE — 65660000000 HC RM CCU STEPDOWN

## 2020-09-06 PROCEDURE — 80048 BASIC METABOLIC PNL TOTAL CA: CPT

## 2020-09-06 PROCEDURE — 74011250636 HC RX REV CODE- 250/636: Performed by: INTERNAL MEDICINE

## 2020-09-06 RX ORDER — POTASSIUM CHLORIDE 14.9 MG/ML
10 INJECTION INTRAVENOUS
Status: COMPLETED | OUTPATIENT
Start: 2020-09-06 | End: 2020-09-06

## 2020-09-06 RX ADMIN — RISPERIDONE 2 MG: 1 TABLET ORAL at 08:45

## 2020-09-06 RX ADMIN — SODIUM CHLORIDE 250 ML/HR: 900 INJECTION, SOLUTION INTRAVENOUS at 13:07

## 2020-09-06 RX ADMIN — QUETIAPINE FUMARATE 200 MG: 100 TABLET ORAL at 16:54

## 2020-09-06 RX ADMIN — ASPIRIN 81 MG: 81 TABLET, COATED ORAL at 08:45

## 2020-09-06 RX ADMIN — ENOXAPARIN SODIUM 40 MG: 40 INJECTION SUBCUTANEOUS at 13:09

## 2020-09-06 RX ADMIN — DIVALPROEX SODIUM 1000 MG: 500 TABLET, DELAYED RELEASE ORAL at 22:12

## 2020-09-06 RX ADMIN — SODIUM CHLORIDE 250 ML/HR: 900 INJECTION, SOLUTION INTRAVENOUS at 01:54

## 2020-09-06 RX ADMIN — DIPHENHYDRAMINE HYDROCHLORIDE 50 MG: 25 CAPSULE ORAL at 22:12

## 2020-09-06 RX ADMIN — TOPIRAMATE 100 MG: 100 TABLET, FILM COATED ORAL at 17:08

## 2020-09-06 RX ADMIN — POTASSIUM CHLORIDE 10 MEQ: 200 INJECTION, SOLUTION INTRAVENOUS at 10:38

## 2020-09-06 RX ADMIN — POTASSIUM CHLORIDE 10 MEQ: 200 INJECTION, SOLUTION INTRAVENOUS at 12:19

## 2020-09-06 RX ADMIN — TOPIRAMATE 100 MG: 100 TABLET, FILM COATED ORAL at 08:45

## 2020-09-06 RX ADMIN — POTASSIUM CHLORIDE 20 MEQ: 750 TABLET, FILM COATED, EXTENDED RELEASE ORAL at 08:44

## 2020-09-06 RX ADMIN — RISPERIDONE 4 MG: 3 TABLET ORAL at 22:16

## 2020-09-06 RX ADMIN — QUETIAPINE FUMARATE 200 MG: 100 TABLET ORAL at 08:44

## 2020-09-06 RX ADMIN — CLONIDINE HYDROCHLORIDE 0.1 MG: 0.1 TABLET ORAL at 17:08

## 2020-09-06 RX ADMIN — DIVALPROEX SODIUM 1000 MG: 500 TABLET, DELAYED RELEASE ORAL at 08:44

## 2020-09-06 RX ADMIN — CLONIDINE HYDROCHLORIDE 0.1 MG: 0.1 TABLET ORAL at 08:44

## 2020-09-06 RX ADMIN — LACTULOSE 45 ML: 20 SOLUTION ORAL at 08:43

## 2020-09-06 RX ADMIN — POTASSIUM CHLORIDE 10 MEQ: 200 INJECTION, SOLUTION INTRAVENOUS at 09:33

## 2020-09-06 RX ADMIN — DIVALPROEX SODIUM 1000 MG: 500 TABLET, DELAYED RELEASE ORAL at 16:54

## 2020-09-06 RX ADMIN — SODIUM CHLORIDE 250 ML/HR: 900 INJECTION, SOLUTION INTRAVENOUS at 22:13

## 2020-09-06 RX ADMIN — MELATONIN 4.5 MG: at 22:12

## 2020-09-06 RX ADMIN — POTASSIUM CHLORIDE 20 MEQ: 750 TABLET, FILM COATED, EXTENDED RELEASE ORAL at 17:08

## 2020-09-06 RX ADMIN — SODIUM CHLORIDE 250 ML/HR: 900 INJECTION, SOLUTION INTRAVENOUS at 16:58

## 2020-09-06 RX ADMIN — QUETIAPINE FUMARATE 200 MG: 100 TABLET ORAL at 22:12

## 2020-09-06 RX ADMIN — POTASSIUM CHLORIDE 10 MEQ: 200 INJECTION, SOLUTION INTRAVENOUS at 13:47

## 2020-09-06 RX ADMIN — LACTULOSE 30 ML: 20 SOLUTION ORAL at 22:13

## 2020-09-06 NOTE — CONSULTS
PSYCHIATRY CONSULT NOTE:    REASON FOR CONSULT:  Behavioral    HISTORY OF PRESENTING COMPLAINT:  Rosamaria Rodriguez is a 31 y.o. BLACK OR  male who is currently admitted to the medical floor at [facility]. Patient is calm and compliant during assessment. Patient at this time is not able to completely verbalize at this time due to Moderate ID. Patient has a staff member from his group home at the bedside with him. He currently sees a psychiatrist outpatient through his group home. Patient was brought into hospital for loss of appetite and small tremors. Medications needed to be adjusted and labs were drawn. Denies SI/HI/AVH at this time. Patient staff member endorses he has not had any behaviors at this time and has remained pleasant. No aggression noted at this time. PAST PSYCHIATRIC HISTORY and SUBSTANCE ABUSE HISTORY:  [facility] Denies any previous substance abuse history. PAST MEDICAL HISTORY:  Please see H&P for details. Past Medical History:   Diagnosis Date    Hyperlipidemia     Mental retardation     Obesity (BMI 30-39. 9)     Type II or unspecified type diabetes mellitus without mention of complication, not stated as uncontrolled            Lab Results   Component Value Date/Time    WBC 4.3 09/05/2020 06:08 AM    HGB 11.1 (L) 09/05/2020 06:08 AM    HCT 36.0 (L) 09/05/2020 06:08 AM    PLATELET 917 (L) 95/26/4166 06:08 AM    .0 (H) 09/05/2020 06:08 AM      Lab Results   Component Value Date/Time    Sodium 142 09/06/2020 04:56 AM    Potassium 2.9 (L) 09/06/2020 04:56 AM    Chloride 114 (H) 09/06/2020 04:56 AM    CO2 25 09/06/2020 04:56 AM    Anion gap 3 (L) 09/06/2020 04:56 AM    Glucose 100 09/06/2020 04:56 AM    BUN 4 (L) 09/06/2020 04:56 AM    Creatinine 0.77 09/06/2020 04:56 AM    BUN/Creatinine ratio 5 (L) 09/06/2020 04:56 AM    GFR est AA >60 09/06/2020 04:56 AM    GFR est non-AA >60 09/06/2020 04:56 AM Calcium 9.0 09/06/2020 04:56 AM    Bilirubin, total 0.4 09/05/2020 01:57 AM    Alk. phosphatase 91 09/05/2020 01:57 AM    Protein, total 6.0 (L) 09/05/2020 01:57 AM    Albumin 2.3 (L) 09/05/2020 01:57 AM    Globulin 3.7 09/05/2020 01:57 AM    A-G Ratio 0.6 (L) 09/05/2020 01:57 AM    ALT (SGPT) 14 09/05/2020 01:57 AM          PSYCHOSOCIAL HISTORY:  Lives in a group home      MENTAL STATUS EXAM:    General appearance: psychomotor activity is calm  Eye contact: Fair eye contact  Speech: Spontaneous  Affect :blunted  Mood: pleasant  Thought Process:ID  Perception: Denies AH or VH. Thought Content: Denies SI or Plan  Insight: Partial  Judgement: Fair  Cognition: Impaired due to intellectual disability. ASSESSMENT AND PLAN:  Mook Oneill meets criteria for a diagnosis of  Moderate Intellectual Disability. At this time patient is not a candidate for inpatient psychiatric hospitalization but would benefit greatly from outpatient services and therapy. Thank you for your consideration with this consult.

## 2020-09-06 NOTE — PROGRESS NOTES
Problem: Risk for Spread of Infection  Goal: Prevent transmission of infectious organism to others  Description: Prevent the transmission of infectious organisms to other patients, staff members, and visitors. Outcome: Progressing Towards Goal     Problem: Pressure Injury - Risk of  Goal: *Prevention of pressure injury  Description: Document Ajay Scale and appropriate interventions in the flowsheet. Outcome: Progressing Towards Goal  Note: Pressure Injury Interventions:  Sensory Interventions: Assess changes in LOC    Moisture Interventions: Absorbent underpads    Activity Interventions: Pressure redistribution bed/mattress(bed type)    Mobility Interventions: Assess need for specialty bed    Nutrition Interventions: Document food/fluid/supplement intake    Friction and Shear Interventions: Apply protective barrier, creams and emollients                Problem: Falls - Risk of  Goal: *Absence of Falls  Description: Document Faisal Fall Risk and appropriate interventions in the flowsheet.   Outcome: Progressing Towards Goal  Note: Fall Risk Interventions:  Mobility Interventions: Communicate number of staff needed for ambulation/transfer    Mentation Interventions: Adequate sleep, hydration, pain control    Medication Interventions: Evaluate medications/consider consulting pharmacy    Elimination Interventions: Call light in reach, Toileting schedule/hourly rounds              Problem: Patient Education: Go to Patient Education Activity  Goal: Patient/Family Education  Outcome: Progressing Towards Goal     Problem: General Medical Care Plan  Goal: *Vital signs within specified parameters  Outcome: Progressing Towards Goal  Goal: *Labs within defined limits  Outcome: Progressing Towards Goal  Goal: *Absence of infection signs and symptoms  Outcome: Progressing Towards Goal  Goal: *Optimal pain control at patient's stated goal  Outcome: Progressing Towards Goal  Goal: *Skin integrity maintained  Outcome: Progressing Towards Goal  Goal: *Fluid volume balance  Outcome: Progressing Towards Goal  Goal: *Optimize nutritional status  Outcome: Progressing Towards Goal  Goal: *Anxiety reduced or absent  Outcome: Progressing Towards Goal  Goal: *Progressive mobility and function (eg: ADL's)  Outcome: Progressing Towards Goal

## 2020-09-06 NOTE — PROGRESS NOTES
14:24 Poison Control phoned inquiring about patient's lithium level  Reported last reported 1.47 down from 1.84 yesterday. Gave last set of VS.  Person calling was a Juju Chavez. Another expected phone call tomorrow until levels are within range.   MIGDALIA [place]

## 2020-09-06 NOTE — PROGRESS NOTES
[facility] Adult  Hospitalist Group                                                                                          Hospitalist Progress Note  Tamar Carr MD  Answering service: [phone] from in house phone        Date of Service:  2020  NAME:  Hilda Abdullahi  :  1990  MRN:  [id number]      Admission Summary:     Patient presented to the ED from adult assisted with reported altered mental status. Per report, patient is mostly non-verbal (with exception of incomprehensible speech) at baseline. Patient says some names and \"bye\" on occasion. Patient reportedly was not acting like himself and his mentation and behavior was noticeably altered. Per report, he has been drowsy of the past 2-3 weeks with decreased appetite, ataxia, and tremors. On arrival to the ED tonight, workup included labs including lithium level = 2.18.  K = 2.6. Patient was given KCL 40 mEq po and KCL 10 mEq IV run x 1. Patient notedly usually takes Lithium 50 mg po BID with his last dose given on 2020. ED requested admission to the hospitalist service at which time, I requested consultation with poison consult. Recommendations were to recheck lithium level q 4 hours and IV fluid infusion at 250 ml/hr. There were no reports of recent falls, head or neck trauma. Interval history / Subjective:       Patient has baseline mental retardation and cannot be communicated. I discussed with detail regarding patient condition with care giver. Answered his questions in details. consulted psych. Patient lives in group home as per care giver.       Assessment & Plan:     Lithium toxicity  -Holding lithium, IV fluid per recommendation from poison control  -most recent lithium level 1.47  -psychiatry consulted     Altered mental status, improving   -Secondary to lithium toxicity and electrolyte derangements  -Continue monitor    Hypertension  -Continue clonidine    History of psychosis  -On multiple psych medications, resume home meds  -Holding lithium    Obesity  -Patient follow-up for weight management    Code status: FULL  DVT prophylaxis: Lovenox    Care Plan discussed with: Patient/Family  Anticipated Disposition: SNF/LTC  Anticipated Discharge: 24 hours to 48 hours     Hospital Problems  Date Reviewed: 1/28/2020          Codes Class Noted POA    Altered mental status ICD-10-CM: R41.82  ICD-9-CM: 780.97  9/4/2020 Unknown        Lithium toxicity ICD-10-CM: Y43.862U  ICD-9-CM: 985.8, E980.9  9/4/2020 Unknown                Review of Systems:   A comprehensive review of systems was negative except for that written in the HPI. Vital Signs:    Last 24hrs VS reviewed since prior progress note. Most recent are:  Visit Vitals  /70 (BP 1 Location: Right arm, BP Patient Position: At rest)   Pulse 96   Temp 98.3 °F (36.8 °C)   Resp 20   Ht 6' 3\" (1.905 m)   Wt 126.2 kg (278 lb 3.5 oz)   SpO2 100%   BMI 34.78 kg/m²         Intake/Output Summary (Last 24 hours) at 9/6/2020 1437  Last data filed at 9/6/2020 1346  Gross per 24 hour   Intake 720 ml   Output 2650 ml   Net -1930 ml        Physical Examination:             Constitutional:  No acute distress   ENT:  Oral mucosa moist, oropharynx benign. Resp:  CTA bilaterally. No wheezing/rhonchi/rales. No accessory muscle use   CV:  Regular rhythm, normal rate, no murmurs, gallops, rubs    GI:  Soft, non distended, non tender. normoactive bowel sounds, no hepatosplenomegaly     Musculoskeletal:  No edema, warm, 2+ pulses throughout    Neurologic:  Moves all extremities.   Awake and alert     Skin:  Good turgor, no rashes or ulcers       Data Review:    Review and/or order of clinical lab test      Labs:     Recent Labs     09/05/20  0608 09/04/20  0407   WBC 4.3 5.4   HGB 11.1* 11.5*   HCT 36.0* 36.9   * 123*     Recent Labs     09/06/20  0456 09/05/20  0157 09/04/20  1758 09/04/20  0359 09/03/20  2108    144  --  138 138   K 2.9* 3.2* 3.0* 2.7* 2.6*   * 113*  --  104 101   CO2 25 26  --  28 29   BUN 4* 6  --  9 11   CREA 0.77 0.61*  --  0.85 1.14    78  --  91 88   CA 9.0 9.4  --  9.5 10.4*   MG  --  2.8*  --   --  3.0*     Recent Labs     09/05/20  0157 09/04/20  0359 09/03/20 2108   ALT 14 14 18   AP 91 86 104   TBILI 0.4 0.5 0.6   TP 6.0* 6.3* 7.7   ALB 2.3* 2.6* 3.0*   GLOB 3.7 3.7 4.7*   LPSE  --   --  79     No results for input(s): INR, PTP, APTT, INREXT, INREXT in the last 72 hours. No results for input(s): FE, TIBC, PSAT, FERR in the last 72 hours. No results found for: FOL, RBCF   No results for input(s): PH, PCO2, PO2 in the last 72 hours.   Recent Labs     09/03/20 2108   TROIQ <0.05  <0.05     Lab Results   Component Value Date/Time    Cholesterol, total 157 11/21/2019 10:30 AM    HDL Cholesterol 44 11/21/2019 10:30 AM    LDL, calculated 97 11/21/2019 10:30 AM    Triglyceride 79 11/21/2019 10:30 AM     No results found for: GLUCPOC  No results found for: COLOR, APPRN, SPGRU, REFSG, KULDEEP, PROTU, GLUCU, KETU, BILU, UROU, ONIEL, LEUKU, GLUKE, EPSU, BACTU, WBCU, RBCU, CASTS, UCRY      Medications Reviewed:     Current Facility-Administered Medications   Medication Dose Route Frequency    potassium chloride 10 mEq in 50 ml IVPB  10 mEq IntraVENous Q1H    potassium chloride SR (KLOR-CON 10) tablet 20 mEq  20 mEq Oral BID    acetaminophen (TYLENOL) tablet 650 mg  650 mg Oral Q6H PRN    Or    acetaminophen (TYLENOL) suppository 650 mg  650 mg Rectal Q6H PRN    0.9% sodium chloride infusion  250 mL/hr IntraVENous CONTINUOUS    cloNIDine HCL (CATAPRES) tablet 0.1 mg  0.1 mg Oral BID    aspirin delayed-release tablet 81 mg  81 mg Oral DAILY    topiramate (TOPAMAX) tablet 100 mg  100 mg Oral BID    LORazepam (ATIVAN) tablet 1 mg  1 mg Oral BID PRN    QUEtiapine (SEROquel) tablet 200 mg  200 mg Oral TID    lactulose (CHRONULAC) 10 gram/15 mL solution 45 mL  45 mL Oral DAILY    divalproex DR (DEPAKOTE) tablet 1,000 mg  1,000 mg Oral TID    risperiDONE (RisperDAL) tablet 4 mg  4 mg Oral QHS    risperiDONE (RisperDAL) tablet 2 mg  2 mg Oral DAILY    diphenhydrAMINE (BENADRYL) capsule 50 mg  50 mg Oral QHS    lactulose (CHRONULAC) 10 gram/15 mL solution 30 mL  30 mL Oral QHS    melatonin tablet 4.5 mg  4.5 mg Oral QHS    enoxaparin (LOVENOX) injection 40 mg  40 mg SubCUTAneous Q24H     ______________________________________________________________________  EXPECTED LENGTH OF STAY: - - -  ACTUAL LENGTH OF STAY:          2                 Josh Mccarty MD

## 2020-09-07 LAB
DATE LAST DOSE: ABNORMAL
LITHIUM SERPL-SCNC: 1.23 MMOL/L (ref 0.6–1.2)
REPORTED DOSE,DOSE: ABNORMAL UNITS
REPORTED DOSE/TIME,TMG: ABNORMAL

## 2020-09-07 PROCEDURE — 97530 THERAPEUTIC ACTIVITIES: CPT

## 2020-09-07 PROCEDURE — 74011250637 HC RX REV CODE- 250/637: Performed by: FAMILY MEDICINE

## 2020-09-07 PROCEDURE — 36415 COLL VENOUS BLD VENIPUNCTURE: CPT

## 2020-09-07 PROCEDURE — 74011250636 HC RX REV CODE- 250/636: Performed by: FAMILY MEDICINE

## 2020-09-07 PROCEDURE — 99222 1ST HOSP IP/OBS MODERATE 55: CPT | Performed by: PSYCHIATRY & NEUROLOGY

## 2020-09-07 PROCEDURE — 74011250636 HC RX REV CODE- 250/636: Performed by: INTERNAL MEDICINE

## 2020-09-07 PROCEDURE — 97161 PT EVAL LOW COMPLEX 20 MIN: CPT

## 2020-09-07 PROCEDURE — 65660000000 HC RM CCU STEPDOWN

## 2020-09-07 PROCEDURE — 97535 SELF CARE MNGMENT TRAINING: CPT

## 2020-09-07 PROCEDURE — 97166 OT EVAL MOD COMPLEX 45 MIN: CPT

## 2020-09-07 PROCEDURE — 80178 ASSAY OF LITHIUM: CPT

## 2020-09-07 PROCEDURE — 74011250637 HC RX REV CODE- 250/637: Performed by: INTERNAL MEDICINE

## 2020-09-07 RX ORDER — POTASSIUM CHLORIDE 750 MG/1
20 TABLET, FILM COATED, EXTENDED RELEASE ORAL 2 TIMES DAILY
Status: COMPLETED | OUTPATIENT
Start: 2020-09-07 | End: 2020-09-09

## 2020-09-07 RX ORDER — POTASSIUM CHLORIDE 14.9 MG/ML
10 INJECTION INTRAVENOUS
Status: DISPENSED | OUTPATIENT
Start: 2020-09-07 | End: 2020-09-07

## 2020-09-07 RX ADMIN — ENOXAPARIN SODIUM 40 MG: 40 INJECTION SUBCUTANEOUS at 13:30

## 2020-09-07 RX ADMIN — DIVALPROEX SODIUM 1000 MG: 500 TABLET, DELAYED RELEASE ORAL at 15:21

## 2020-09-07 RX ADMIN — RISPERIDONE 2 MG: 1 TABLET ORAL at 09:10

## 2020-09-07 RX ADMIN — DIVALPROEX SODIUM 1000 MG: 500 TABLET, DELAYED RELEASE ORAL at 23:33

## 2020-09-07 RX ADMIN — POTASSIUM CHLORIDE 10 MEQ: 14.9 INJECTION, SOLUTION INTRAVENOUS at 11:29

## 2020-09-07 RX ADMIN — QUETIAPINE FUMARATE 200 MG: 100 TABLET ORAL at 15:21

## 2020-09-07 RX ADMIN — TOPIRAMATE 100 MG: 100 TABLET, FILM COATED ORAL at 09:11

## 2020-09-07 RX ADMIN — POTASSIUM CHLORIDE 10 MEQ: 14.9 INJECTION, SOLUTION INTRAVENOUS at 09:11

## 2020-09-07 RX ADMIN — LACTULOSE 45 ML: 20 SOLUTION ORAL at 09:11

## 2020-09-07 RX ADMIN — POTASSIUM CHLORIDE 20 MEQ: 750 TABLET, FILM COATED, EXTENDED RELEASE ORAL at 17:48

## 2020-09-07 RX ADMIN — POTASSIUM CHLORIDE 10 MEQ: 14.9 INJECTION, SOLUTION INTRAVENOUS at 13:30

## 2020-09-07 RX ADMIN — POTASSIUM CHLORIDE 20 MEQ: 750 TABLET, FILM COATED, EXTENDED RELEASE ORAL at 09:10

## 2020-09-07 RX ADMIN — QUETIAPINE FUMARATE 200 MG: 100 TABLET ORAL at 23:33

## 2020-09-07 RX ADMIN — ASPIRIN 81 MG: 81 TABLET, COATED ORAL at 09:10

## 2020-09-07 RX ADMIN — CLONIDINE HYDROCHLORIDE 0.1 MG: 0.1 TABLET ORAL at 09:10

## 2020-09-07 RX ADMIN — QUETIAPINE FUMARATE 200 MG: 100 TABLET ORAL at 09:11

## 2020-09-07 RX ADMIN — DIPHENHYDRAMINE HYDROCHLORIDE 50 MG: 25 CAPSULE ORAL at 23:33

## 2020-09-07 RX ADMIN — DIVALPROEX SODIUM 1000 MG: 500 TABLET, DELAYED RELEASE ORAL at 09:10

## 2020-09-07 RX ADMIN — RISPERIDONE 4 MG: 3 TABLET ORAL at 23:33

## 2020-09-07 RX ADMIN — TOPIRAMATE 100 MG: 100 TABLET, FILM COATED ORAL at 17:48

## 2020-09-07 RX ADMIN — CLONIDINE HYDROCHLORIDE 0.1 MG: 0.1 TABLET ORAL at 17:48

## 2020-09-07 NOTE — PROGRESS NOTES
Verbal shift change report given to MIGDALIA Painting (oncoming nurse) by Monica Glasgow RN (offgoing nurse). Report included the following information SBAR, Procedure Summary, Intake/Output, MAR, Recent Results and Cardiac Rhythm NSR.

## 2020-09-07 NOTE — PROGRESS NOTES
Problem: Self Care Deficits Care Plan (Adult)  Goal: *Acute Goals and Plan of Care (Insert Text)  Description:   FUNCTIONAL STATUS PRIOR TO ADMISSION: patient non-verbal. Performing all basic ADLs with increased time, visual cues and familiarity of routine. Aid and home performs all instrumental ADLs. Patient enjoys leaving the house to feed ducks, Putt putt, etc.  Motivated by food and drinks in which aid has to use sparingly due to obesity (patient gestures at mouth for food). Does well with simple humor. Requires external cues to motivate patient, otherwise learned dependency. Recently increased weakness, falling getting in and out of tub. HOME SUPPORT: The patient lived with group home residents and care aid 1/1 5 hours each day M-Th. Occupational Therapy Goals  Initiated 9/7/2020  1. Patient will perform opening and closing ADL items 2/2 with minimal assistance/contact guard assist within 7 day(s). 2.  Patient will perform self feeding with setup only within 7 day(s). 3.  Patient will perform bathing with moderate A within 7 day(s). 4.  Patient will perform BSC toilet transfers with moderate assistance  within 7 day(s). 5.  Patient will perform all aspects of toileting with moderate assistance  within 7 day(s). Outcome: Not Met   OCCUPATIONAL THERAPY EVALUATION  Patient: Mook Oneill (83 y.o. male)  Date: 9/7/2020  Primary Diagnosis: Lithium toxicity [T56.891A]  Altered mental status [R41.82]        Precautions:   Bed Alarm    ASSESSMENT  Based on the objective data described below, the patient presents below functional baseline. ADLs are limited by intentional tremors, decreased strength, ROM, sitting balance, activity tolerance, functional reach and ability to complete standing ADLs. Baseline girth, behavioral management and cognition.     Current Level of Function Impacting Discharge (ADLs/self-care): moderate A upper body ADLs, max A lower body ADLs, chair mobility with min A    Functional Outcome Measure: The patient scored 25/100 on the Barthel Index outcome measure which is indicative of 75% impaired ability to care for self/dependency on others. Other factors to consider for discharge: group home, 1:1 aid     Patient will benefit from skilled therapy intervention to address the above noted impairments. PLAN :  Recommendations and Planned Interventions: self care training, functional mobility training, therapeutic exercise, balance training, therapeutic activities, cognitive retraining, endurance activities, neuromuscular re-education, patient education, home safety training, and family training/education    Frequency/Duration: Patient will be followed by occupational therapy 5 times a week to address goals. Recommendation for discharge: (in order for the patient to meet his/her long term goals)  Therapy up to 5 days/week in SNF setting or an intensive home health therapy program pending medical progression. As well as ability to care for self, functional mobility as required by aid and group home. This discharge recommendation:  Has not yet been discussed the attending provider and/or case management    IF patient discharges home will need the following DME: transfer bench and wheelchair       SUBJECTIVE:   Patient stated [facility].    OBJECTIVE DATA SUMMARY:   HISTORY:   Past Medical History:   Diagnosis Date    Hyperlipidemia     Mental retardation     Obesity (BMI 30-39. 9)     Type II or unspecified type diabetes mellitus without mention of complication, not stated as uncontrolled    History reviewed. No pertinent surgical history.     Expanded or extensive additional review of patient history:     Home Situation  Home Environment: Group home  One/Two Story Residence: Two story  Living Alone: No  Support Systems: /(1:1 aid M-Th 5 hours each day)  Patient Expects to be Discharged to[de-identified] Group home  Tub or Shower Type: Tub/Shower combination    Hand dominance: Right    EXAMINATION OF PERFORMANCE DEFICITS:  Cognitive/Behavioral Status:  Neurologic State: Alert  Orientation Level: Unable to verbalize  Cognition: Poor safety awareness;Recognition of people/places; Impaired decision making  Perception: Appears intact     Safety/Judgement: Decreased insight into deficits; Decreased awareness of need for safety;Decreased awareness of need for assistance;Decreased awareness of environment  Baseline impulsive. Did verbalize with smiles and \"uhh\" during basic conversation. Skin: intact L PIV    Edema: intact    Hearing:       Vision/Perceptual:                                     Range of Motion:    AROM: Within functional limits(tremors and ataxia noted with AROM)  PROM: Within functional limits                      Strength:    Strength: Generally decreased, functional                Coordination:  Coordination: Generally decreased, functional  Fine Motor Skills-Upper: Left Impaired;Right Impaired    Gross Motor Skills-Upper: Left Impaired;Right Impaired    Tone & Sensation:    Tone: Normal                         Balance:  Sitting: Impaired  Sitting - Static: Good (unsupported)  Sitting - Dynamic: Fair (occasional)  Standing: Impaired; With support  Standing - Static: Poor  Standing - Dynamic : Poor    Functional Mobility and Transfers for ADLs:  Bed Mobility:      Transfers:  Sit to Stand: Maximum assistance;Assist x2(from low bed and from bedside chair, patient having difficulty. No cues to push from chair. BSC: total A  Tub Transfer: Total assistance(not safe at this time)    ADL Assessment:  Feeding: Moderate assistance setup cup Right hand, support at cup to minimize physical and cognitive assistance, tremors limiting. Patient aware that drinks came from behind the curtain. When he completed his drink, he grunted, pointed to left and then at mouth.      Oral Facial Hygiene/Grooming: Maximum assistance setup cloth R hand, assist for thoroughness washing face    Bathing: Maximum assistance CHG, setup clothes each hand, proximal support and tactile cueing. Patient then early termination, closing eyes so therapist will perform task. Aid told him he has to finish so he can have a drink - patient perked up and completed lower body with moderate A 2* decreased GM and thoroughness. Upper Body Dressing: Moderate assistance gown no assistance to pull over elbows once setup on forearms; total A otherwise    Lower Body Dressing: Maximum assistance total A socks    Toileting: Total assistance brief, able to reach for liya hygiene though                ADL Intervention and task modifications:                                     Cognitive Retraining  Safety/Judgement: Decreased insight into deficits; Decreased awareness of need for safety;Decreased awareness of need for assistance;Decreased awareness of environment    Therapeutic Exercise:     Functional Measure:  Barthel Index:    Bathin  Bladder: 5  Bowels: 5  Groomin  Dressin  Feedin  Mobility: 5  Stairs: 0  Toilet Use: 0  Transfer (Bed to Chair and Back): 5  Total: 25/100        The Barthel ADL Index: Guidelines  1. The index should be used as a record of what a patient does, not as a record of what a patient could do. 2. The main aim is to establish degree of independence from any help, physical or verbal, however minor and for whatever reason. 3. The need for supervision renders the patient not independent. 4. A patient's performance should be established using the best available evidence. Asking the patient, friends/relatives and nurses are the usual sources, but direct observation and common sense are also important. However direct testing is not needed. 5. Usually the patient's performance over the preceding 24-48 hours is important, but occasionally longer periods will be relevant. 6. Middle categories imply that the patient supplies over 50 per cent of the effort.   7. Use of aids to be independent is allowed. Elizabeth Hart., Barthel DAlbertaW. ([id number]). Functional evaluation: the Barthel Index. [address] (14)2. SANAZ Ordoñez Darroll Safer., Westley Rather., [place], [address] (1999). Measuring the change indisability after inpatient rehabilitation; comparison of the responsiveness of the Barthel Index and Functional Armstrong Measure. Journal of Neurology, Neurosurgery, and Psychiatry, 66(4), 507-904. PURA Aviles, MARIANGEL Cannon, & Andrew Terrazas M.A. (2004.) Assessment of post-stroke quality of life in cost-effectiveness studies: The usefulness of the Barthel Index and the EuroQoL-5D. Quality of Life Research, 15, 620-15         Occupational Therapy Evaluation Charge Determination   History Examination Decision-Making           Based on the above components, the patient evaluation is determined to be of the following complexity level:   Pain Rating:      Activity Tolerance:   Fair  Please refer to the flowsheet for vital signs taken during this treatment. After treatment patient left in no apparent distress:    Sitting in chair, Call bell within reach, and Caregiver / family present    COMMUNICATION/EDUCATION:   The patients plan of care was discussed with: Physical therapist and Registered nurse. Home safety education was provided and the patient/caregiver indicated understanding., Patient/family have participated as able in goal setting and plan of care. , and Patient/family agree to work toward stated goals and plan of care. This patients plan of care is appropriate for delegation to \[facility]\"".     Thank you for this referral.  Ellaree Dancer  Time Calculation: 35 mins

## 2020-09-07 NOTE — CONSULTS
INPATIENT NEUROLOGY CONSULTATION  9/7/2020     Consulted by: Nahun Vázquez MD        Patient ID:  Elian Mayorga  [id number]  31 y.o.  1990    CC tremor    HPI    Santiago Nichols is a 34-year-old gentleman here from a group home for lithium toxicity. I spoke with his caretaker who is at the bedside and knows him very well. It sounds like a week prior to presentation he was having increasing weakness globally drowsiness tremors some confusion. On presentation admitted on September 3 he had lithium toxicity and required IV fluid for correction. Since admission he has been essentially bedbound. According to his caretaker, his baseline is that he ambulates independently. He is nonverbal aside from single sounds in single words. No history of seizures. Caretaker says currently he is approaching his baseline with the exception of the tremulousness throughout. Review of Systems   Unable to perform ROS: Mental acuity       Past Medical History:   Diagnosis Date    Hyperlipidemia     Mental retardation     Obesity (BMI 30-39. 9)     Type II or unspecified type diabetes mellitus without mention of complication, not stated as uncontrolled      No family history on file.   Social History     Socioeconomic History    Marital status: SINGLE     Spouse name: Not on file    Number of children: Not on file    Years of education: Not on file    Highest education level: Not on file   Occupational History    Not on file   Social Needs    Financial resource strain: Not on file    Food insecurity     Worry: Not on file     Inability: Not on file    Transportation needs     Medical: Not on file     Non-medical: Not on file   Tobacco Use    Smoking status: Never Smoker    Smokeless tobacco: Never Used   Substance and Sexual Activity    Alcohol use: No    Drug use: No    Sexual activity: Never   Lifestyle    Physical activity     Days per week: Not on file     Minutes per session: Not on file    Stress: Not on file   Relationships    Social connections     Talks on phone: Not on file     Gets together: Not on file     Attends Gnosticist service: Not on file     Active member of club or organization: Not on file     Attends meetings of clubs or organizations: Not on file     Relationship status: Not on file    Intimate partner violence     Fear of current or ex partner: Not on file     Emotionally abused: Not on file     Physically abused: Not on file     Forced sexual activity: Not on file   Other Topics Concern    Not on file   Social History Narrative    Not on file     Current Facility-Administered Medications   Medication Dose Route Frequency    potassium chloride 10 mEq in 50 ml IVPB  10 mEq IntraVENous Q1H    potassium chloride SR (KLOR-CON 10) tablet 20 mEq  20 mEq Oral BID    acetaminophen (TYLENOL) tablet 650 mg  650 mg Oral Q6H PRN    Or    acetaminophen (TYLENOL) suppository 650 mg  650 mg Rectal Q6H PRN    0.9% sodium chloride infusion  100 mL/hr IntraVENous CONTINUOUS    cloNIDine HCL (CATAPRES) tablet 0.1 mg  0.1 mg Oral BID    aspirin delayed-release tablet 81 mg  81 mg Oral DAILY    topiramate (TOPAMAX) tablet 100 mg  100 mg Oral BID    LORazepam (ATIVAN) tablet 1 mg  1 mg Oral BID PRN    QUEtiapine (SEROquel) tablet 200 mg  200 mg Oral TID    lactulose (CHRONULAC) 10 gram/15 mL solution 45 mL  45 mL Oral DAILY    divalproex DR (DEPAKOTE) tablet 1,000 mg  1,000 mg Oral TID    risperiDONE (RisperDAL) tablet 4 mg  4 mg Oral QHS    risperiDONE (RisperDAL) tablet 2 mg  2 mg Oral DAILY    diphenhydrAMINE (BENADRYL) capsule 50 mg  50 mg Oral QHS    lactulose (CHRONULAC) 10 gram/15 mL solution 30 mL  30 mL Oral QHS    melatonin tablet 4.5 mg  4.5 mg Oral QHS    enoxaparin (LOVENOX) injection 40 mg  40 mg SubCUTAneous Q24H     No Known Allergies    Visit Vitals  /82 (BP 1 Location: Right arm, BP Patient Position: At rest)   Pulse 83   Temp 99.1 °F (37.3 °C)   Resp 16   Ht 6' 3\" (1.905 m) Wt 126.8 kg (279 lb 8.7 oz)   SpO2 100%   BMI 34.94 kg/m²     Physical Exam   Constitutional: He appears well-developed and well-nourished. Cardiovascular: Normal rate. Pulmonary/Chest: Effort normal.   Skin: Skin is warm and dry. Vitals reviewed. Neurologic Exam     Mental Status   Adult gentleman sitting upright in chair. Somewhat disinhibited loud voice single sounds in words. No clear sentences. He can simple follow commands with encouragement and repetition. Pupils appear equal and symmetric with close set eyes exam limited secondary to poor comprehension at baseline  Face appears grossly symmetric with smiling, poor dentition tongue seems midline  He is moving all extremities with purpose spontaneously. He can lift both arms above his head and maintain. With outstretched hands he has some tremulousness of the arms but no clear tremor. Not pill-rolling not resting type. No rigidity. Gait deferred secondary to poor comprehension            Lab Results   Component Value Date/Time    WBC 4.3 09/05/2020 06:08 AM    HGB 11.1 (L) 09/05/2020 06:08 AM    HCT 36.0 (L) 09/05/2020 06:08 AM    PLATELET 629 (L) 20/58/9233 06:08 AM    .0 (H) 09/05/2020 06:08 AM     Lab Results   Component Value Date/Time    Hemoglobin A1c 4.4 (L) 11/21/2019 10:30 AM    Hemoglobin A1c 4.9 05/15/2018 11:32 AM    Hemoglobin A1c 5.2 11/14/2017 10:59 AM    Glucose 100 09/06/2020 04:56 AM    Microalb/Creat ratio (ug/mg creat.) 1.3 06/03/2013 11:48 AM    LDL, calculated 97 11/21/2019 10:30 AM    Creatinine 0.77 09/06/2020 04:56 AM      Lab Results   Component Value Date/Time    Cholesterol, total 157 11/21/2019 10:30 AM    HDL Cholesterol 44 11/21/2019 10:30 AM    LDL, calculated 97 11/21/2019 10:30 AM    Triglyceride 79 11/21/2019 10:30 AM     Lab Results   Component Value Date/Time    ALT (SGPT) 14 09/05/2020 01:57 AM    Alk.  phosphatase 91 09/05/2020 01:57 AM    Bilirubin, total 0.4 09/05/2020 01:57 AM    Albumin 2.3 (L) 09/05/2020 01:57 AM    Protein, total 6.0 (L) 09/05/2020 01:57 AM    Ammonia 17 09/03/2020 09:08 PM    PLATELET 901 (L) 86/50/5767 06:08 AM        CT Results (maximum last 3): No results found for this or any previous visit. MRI Results (maximum last 3): No results found for this or any previous visit. VAS/US/Carotid Doppler Results (maximum last 3): No results found for this or any previous visit. PET Results (maximum last 3): No results found for this or any previous visit. Assessment and Plan        43-year-old gentleman with developmental delay on multiple psychotropics who presented with lithium toxicity. I suspect the tremors that had preceded presentation were medication induced. He is also on Depakote which was slightly supratherapeutic which can also lead to tremors. Toxicity has resolved but now I think the tremulousness is probably due to debility being bedbound for the past several days also with poor sleep given the change in environment. Today is the first day he has been out of bed. I would continue PT evaluation and treatment. No change to his medications at this time. I spoke with his caretaker personally. We will check on him in a couple days to see how he is doing. I do not think he needs any neuroimaging or EEG. This clinical note was dictated with an electronic dictation software that can make unintentional errors. If there are any questions, please contact me directly for clarification.       2 [facility],   NEUROLOGIST  Diplomate KULWINDER  9/7/2020

## 2020-09-07 NOTE — ROUTINE PROCESS
Received a call from Stacey Dubose at [phone]. He is requesting to speak with Dr. Neville Mccormick.

## 2020-09-07 NOTE — PROGRESS NOTES
TRANSFER - OUT REPORT:    Verbal report given to Michelle(name) on [facility]  being transferred to [facility](unit) for routine progression of care       Report consisted of patients Situation, Background, Assessment and   Recommendations(SBAR). Information from the following report(s) SBAR, ED Summary, Intake/Output, MAR, Recent Results and Cardiac Rhythm normal sinus was reviewed with the receiving nurse. Lines:   Peripheral IV 09/03/20 Left Antecubital (Active)   Site Assessment Clean, dry, & intact 09/07/20 0400   Phlebitis Assessment 0 09/07/20 0400   Infiltration Assessment 0 09/07/20 0400   Dressing Status Clean, dry, & intact 09/07/20 0400   Dressing Type Transparent 09/07/20 0400   Hub Color/Line Status Pink; Infusing 09/07/20 0400   Action Taken Open ports on tubing capped 09/07/20 0400   Alcohol Cap Used Yes 09/07/20 0400        Opportunity for questions and clarification was provided.       Patient transported with:   [employer]

## 2020-09-07 NOTE — ROUTINE PROCESS
Bedside and Verbal shift change report given to Naomi Bojorquez (oncoming nurse) by Henry Schulz (offgoing nurse). Report included the following information SBAR, Kardex and Recent Results.

## 2020-09-07 NOTE — PROGRESS NOTES
[address] [facility] Adult  Hospitalist Group                                                                                          Hospitalist Progress Note  Ainsley Lemus MD  Answering service: [phone] from in house phone        Date of Service:  2020  NAME:  Tata Rodriguez  :  1990  MRN:  [id number]      Admission Summary:     Patient presented to the ED from adult custodial with reported altered mental status. Per report, patient is mostly non-verbal (with exception of incomprehensible speech) at baseline. Patient says some names and \"bye\" on occasion. Patient reportedly was not acting like himself and his mentation and behavior was noticeably altered. Per report, he has been drowsy of the past 2-3 weeks with decreased appetite, ataxia, and tremors. On arrival to the ED tonight, workup included labs including lithium level = 2.18.  K = 2.6. Patient was given KCL 40 mEq po and KCL 10 mEq IV run x 1. Patient notedly usually takes Lithium 50 mg po BID with his last dose given on 2020. ED requested admission to the hospitalist service at which time, I requested consultation with poison consult. Recommendations were to recheck lithium level q 4 hours and IV fluid infusion at 250 ml/hr. There were no reports of recent falls, head or neck trauma. Interval history / Subjective:       Patient has baseline mental retardation and cannot be communicated. I discussed with detail regarding patient condition with care giver. Answered his questions in details. consulted psych. Patient lives in group home as per care giver. He was concerned regarding new onset shakiness bilaterally  Discuss regarding neurology consultation. Agreement with plan  Lithium levels trending down   Tried calling Guardian. Unable to reach. Left voice message   Addendum:   talked to guardian in detail. He wants to start lithium level on a lower dose around 500 mg..  He mentioned to me that he is trying to get outpatient psych appointment asap. I told him regarding possible covid test required by senior living. He was in agreement with that. Assessment & Plan:     Lithium toxicity  -Holding lithium, IV fluid per recommendation from poison control  -most recent lithium level 1.23  -psychiatry consulted , no additional recommendation  -will recheck lithium level tomorrow   Consulted neurology for new onset hand tremors? Shaking as per care giver     Altered mental status, improving   -Secondary to lithium toxicity and electrolyte derangements  -Continue monitor    Hypertension  -Continue clonidine    History of psychosis  -On multiple psych medications, resume home meds  -Holding lithium    #Hypokalemia  IV replacement ordered    Obesity  -Patient follow-up for weight management    Code status: FULL  DVT prophylaxis: Lovenox    Care Plan discussed with: Patient/Family  Anticipated Disposition: SNF/LTC  Anticipated Discharge: 24 hours to 48 hours     Hospital Problems  Date Reviewed: 1/28/2020          Codes Class Noted POA    Altered mental status ICD-10-CM: R41.82  ICD-9-CM: 780.97  9/4/2020 Unknown        Lithium toxicity ICD-10-CM: R82.645V  ICD-9-CM: 985.8, E980.9  9/4/2020 Unknown                Review of Systems:   A comprehensive review of systems was negative except for that written in the HPI. Vital Signs:    Last 24hrs VS reviewed since prior progress note. Most recent are:  Visit Vitals  /75 (BP 1 Location: Right arm, BP Patient Position: At rest)   Pulse 85   Temp 97.4 °F (36.3 °C)   Resp 16   Ht 6' 3\" (1.905 m)   Wt 126.8 kg (279 lb 8.7 oz)   SpO2 100%   BMI 34.94 kg/m²         Intake/Output Summary (Last 24 hours) at 9/7/2020 0904  Last data filed at 9/7/2020 0751  Gross per 24 hour   Intake 720 ml   Output 3600 ml   Net -2880 ml        Physical Examination:             Constitutional:  No acute distress   ENT:  Oral mucosa moist, oropharynx benign. Resp:  CTA bilaterally.  No wheezing/rhonchi/rales. No accessory muscle use   CV:  Regular rhythm, normal rate, no murmurs, gallops, rubs    GI:  Soft, non distended, non tender. normoactive bowel sounds, no hepatosplenomegaly     Musculoskeletal:  No edema, warm, 2+ pulses throughout    Neurologic:  Moves all extremities. Awake and alert     Skin:  Good turgor, no rashes or ulcers       Data Review:    Review and/or order of clinical lab test      Labs:     Recent Labs     09/05/20  0608   WBC 4.3   HGB 11.1*   HCT 36.0*   *     Recent Labs     09/06/20  0456 09/05/20  0157 09/04/20  1758    144  --    K 2.9* 3.2* 3.0*   * 113*  --    CO2 25 26  --    BUN 4* 6  --    CREA 0.77 0.61*  --     78  --    CA 9.0 9.4  --    MG  --  2.8*  --      Recent Labs     09/05/20  0157   ALT 14   AP 91   TBILI 0.4   TP 6.0*   ALB 2.3*   GLOB 3.7     No results for input(s): INR, PTP, APTT, INREXT, INREXT in the last 72 hours. No results for input(s): FE, TIBC, PSAT, FERR in the last 72 hours. No results found for: FOL, RBCF   No results for input(s): PH, PCO2, PO2 in the last 72 hours. No results for input(s): CPK, CKNDX, TROIQ in the last 72 hours.     No lab exists for component: CPKMB  Lab Results   Component Value Date/Time    Cholesterol, total 157 11/21/2019 10:30 AM    HDL Cholesterol 44 11/21/2019 10:30 AM    LDL, calculated 97 11/21/2019 10:30 AM    Triglyceride 79 11/21/2019 10:30 AM     No results found for: GLUCPOC  No results found for: COLOR, APPRN, SPGRU, REFSG, KULDEEP, PROTU, GLUCU, KETU, BILU, UROU, ONIEL, LEUKU, GLUKE, EPSU, BACTU, WBCU, RBCU, CASTS, UCRY      Medications Reviewed:     Current Facility-Administered Medications   Medication Dose Route Frequency    potassium chloride 10 mEq in 50 ml IVPB  10 mEq IntraVENous Q1H    potassium chloride SR (KLOR-CON 10) tablet 20 mEq  20 mEq Oral BID    acetaminophen (TYLENOL) tablet 650 mg  650 mg Oral Q6H PRN    Or    acetaminophen (TYLENOL) suppository 650 mg 650 mg Rectal Q6H PRN    0.9% sodium chloride infusion  100 mL/hr IntraVENous CONTINUOUS    cloNIDine HCL (CATAPRES) tablet 0.1 mg  0.1 mg Oral BID    aspirin delayed-release tablet 81 mg  81 mg Oral DAILY    topiramate (TOPAMAX) tablet 100 mg  100 mg Oral BID    LORazepam (ATIVAN) tablet 1 mg  1 mg Oral BID PRN    QUEtiapine (SEROquel) tablet 200 mg  200 mg Oral TID    lactulose (CHRONULAC) 10 gram/15 mL solution 45 mL  45 mL Oral DAILY    divalproex DR (DEPAKOTE) tablet 1,000 mg  1,000 mg Oral TID    risperiDONE (RisperDAL) tablet 4 mg  4 mg Oral QHS    risperiDONE (RisperDAL) tablet 2 mg  2 mg Oral DAILY    diphenhydrAMINE (BENADRYL) capsule 50 mg  50 mg Oral QHS    lactulose (CHRONULAC) 10 gram/15 mL solution 30 mL  30 mL Oral QHS    melatonin tablet 4.5 mg  4.5 mg Oral QHS    enoxaparin (LOVENOX) injection 40 mg  40 mg SubCUTAneous Q24H     ______________________________________________________________________  EXPECTED LENGTH OF STAY: - - -  ACTUAL LENGTH OF STAY:          3                 Jovani Lopez MD

## 2020-09-07 NOTE — PROGRESS NOTES
Problem: Risk for Spread of Infection  Goal: Prevent transmission of infectious organism to others  Description: Prevent the transmission of infectious organisms to other patients, staff members, and visitors. Outcome: Progressing Towards Goal     Problem: Pressure Injury - Risk of  Goal: *Prevention of pressure injury  Description: Document Ajay Scale and appropriate interventions in the flowsheet. Outcome: Progressing Towards Goal  Note: Pressure Injury Interventions:  Sensory Interventions: Assess changes in LOC    Moisture Interventions: Absorbent underpads    Activity Interventions: PT/OT evaluation    Mobility Interventions: Turn and reposition approx. every two hours(pillow and wedges)    Nutrition Interventions: Document food/fluid/supplement intake, Discuss nutritional consult with provider    Friction and Shear Interventions: Apply protective barrier, creams and emollients                Problem: Falls - Risk of  Goal: *Absence of Falls  Description: Document Silvinalex Aditya Fall Risk and appropriate interventions in the flowsheet.   Outcome: Progressing Towards Goal  Note: Fall Risk Interventions:  Mobility Interventions: Patient to call before getting OOB    Mentation Interventions: Adequate sleep, hydration, pain control    Medication Interventions: Teach patient to arise slowly, Patient to call before getting OOB    Elimination Interventions: Call light in reach              Problem: General Medical Care Plan  Goal: *Vital signs within specified parameters  Outcome: Progressing Towards Goal  Goal: *Labs within defined limits  Outcome: Progressing Towards Goal  Goal: *Absence of infection signs and symptoms  Outcome: Progressing Towards Goal  Goal: *Optimal pain control at patient's stated goal  Outcome: Progressing Towards Goal  Goal: *Skin integrity maintained  Outcome: Progressing Towards Goal  Goal: *Fluid volume balance  Outcome: Progressing Towards Goal  Goal: *Optimize nutritional status  Outcome: Progressing Towards Goal  Goal: *Anxiety reduced or absent  Outcome: Progressing Towards Goal  Goal: *Progressive mobility and function (eg: ADL's)  Outcome: Progressing Towards Goal

## 2020-09-07 NOTE — PROGRESS NOTES
Problem: Mobility Impaired (Adult and Pediatric)  Goal: Interventions  Description: FUNCTIONAL STATUS PRIOR TO ADMISSION: Patient was independent and active without use of DME.    HOME SUPPORT PRIOR TO ADMISSION: The patient lived with in group home with 1:1 aide. Physical Therapy Goals  Initiated 9/7/2020  1. Patient will move from supine to sit and sit to supine  in bed with minimal assistance/contact guard assist within 7 day(s). 2.  Patient will transfer from bed to chair and chair to bed with minimal assistance/contact guard assist using the least restrictive device within 7 day(s). 3.  Patient will perform sit to stand with minimal assistance/contact guard assist within 7 day(s). 4.  Patient will ambulate with minimal assistance/contact guard assist for 300 feet with the least restrictive device within 7 day(s). 5.  Patient will ascend/descend 12 stairs with 1 handrail(s) with minimal assistance/contact guard assist within 7 day(s). Outcome: Progressing Towards Goal   PHYSICAL THERAPY EVALUATION  Patient: Edilson Prakash (61 y.o. male)  Date: 9/7/2020  Primary Diagnosis: Lithium toxicity [T56.891A]  Altered mental status [R41.82]        Precautions: fall, patient non verbal       ASSESSMENT  Based on the objective data described below, the patient presents with  tremors and ataxia in all extremities with any active movement. Caretaker in room during therapy session. He lives in group home and will need to be able to negotiate a flight of steps. Today mobility limited to transfer to chair secondary to concern for safety considering patients size, tremors and ataxia. Per caretaker, tremors have decreased since admission. Today patient needing 2 people for sit to stand to sit and for transfers. Anticipate improvement as his lithium levels decrease. Pending progress, recommend HHPT and 24/7 assist at group home for all out of bed activities vs SNF for rehab before returning to group home.     Current Level of Function Impacting Discharge (mobility/balance): 2 person assist for transfers and ambulation    Functional Outcome Measure: The patient scored 6/28 on the tinetti outcome measure which is indicative of high risk for falls. Other factors to consider for discharge: Will need to be able to negotiate a flight of steps at discharge to get to this room     Patient will benefit from skilled therapy intervention to address the above noted impairments. PLAN :  Recommendations and Planned Interventions: bed mobility training, transfer training, and gait training      Frequency/Duration: Patient will be followed by physical therapy:  5 times a week to address goals. Recommendation for discharge: (in order for the patient to meet his/her long term goals)  Physical therapy at least 2 days/week in the home AND ensure assist and/or supervision for safety with out of bed activity vs SNF for rehab    This discharge recommendation:  A follow-up discussion with the attending provider and/or case management is planned    IF patient discharges home will need the following DME: will continue to assess         SUBJECTIVE:   Patient stated Patient non verbal    OBJECTIVE DATA SUMMARY:   HISTORY:    Past Medical History:   Diagnosis Date    Hyperlipidemia     Mental retardation     Obesity (BMI 30-39. 9)     Type II or unspecified type diabetes mellitus without mention of complication, not stated as uncontrolled    History reviewed. No pertinent surgical history.     Personal factors and/or comorbidities impacting plan of care: non verbal         EXAMINATION/PRESENTATION/DECISION MAKING:   Critical Behavior:  Neurologic State: Alert  Orientation Level: Unable to verbalize  Cognition: Poor safety awareness, Recognition of people/places, Impaired decision making       Range Of Motion:  AROM: Within functional limits(tremors and ataxia noted with AROM)   PROM: Within functional limits         Strength:    Strength: Generally decreased, functional. Tremors and ataxia noted with active movement all extremities      Tone & Sensation:   Tone: Normal          Coordination:  Coordination: Generally decreased, functional     Functional Mobility:  Bed Mobility:   Supine to Sit: Maximum assistance;Assist x1   Scooting: Supervision  Transfers:  Sit to Stand: Maximum assistance;Assist x2(from low bed and from bedside chair, patient having difficul)   Bed to Chair: Minimum assistance;Assist x2(HHA a few steps to the chair)      Balance:   Sitting: Impaired  Sitting - Static: Good (unsupported)  Sitting - Dynamic: Fair (occasional)  Standing: Impaired; With support  Standing - Static: Poor  Standing - Dynamic : Poor  Ambulation/Gait Training:   3-4 steps with 2 min assists ( HHA) Ataxia and tremors noted in trunk and LE's         Functional Measure:  Tinetti test:    Sitting Balance: 1  Arises: 0  Attempts to Rise: 0  Immediate Standing Balance: 0  Standing Balance: 0  Nudged: 0  Eyes Closed: 0  Turn 360 Degrees - Continuous/Discontinuous: 0  Turn 360 Degrees - Steady/Unsteady: 0  Sitting Down: 0  Balance Score: 1 Balance total score  Indication of Gait: 0  R Step Length/Height: 0  L Step Length/Height: 0  R Foot Clearance: 1  L Foot Clearance: 1  Step Symmetry: 1  Step Continuity: 0  Path: 1  Trunk: 1  Walking Time: 0  Gait Score: 5 Gait total score  Total Score: 6/28 Overall total score         Tinetti Tool Score Risk of Falls  <19 = High Fall Risk  19-24 = Moderate Fall Risk  25-28 = Low Fall Risk  Tinetti ME. Performance-Oriented Assessment of Mobility Problems in Elderly Patients. [facility] 66; [id number].  (Scoring Description: PT Bulletin Feb. 10, 1993)    Older adults: Shavonne Black et al, 2009; n = 1000 [facility] elderly evaluated with ABC, CHAPITO, ADL, and IADL)  · Mean CHAPITO score for males aged 69-68 years = 26.21(3.40)  · Mean CHAPITO score for females age 69-68 years = 25.16(4.30)  · Mean CHAPITO score for males over 80 years = 23.29(6.02)  · Mean CHAPITO score for females over 80 years = 17.20(8.32)          Pain Rating:  No complaint    Activity Tolerance:   Good  Please refer to the flowsheet for vital signs taken during this treatment. After treatment patient left in no apparent distress:   Sitting in chair, Call bell within reach, and Caregiver / family present    COMMUNICATION/EDUCATION:   The patients plan of care was discussed with: Registered nurse. Fall prevention education was provided and the patient/caregiver indicated understanding. and Patient is unable to participate in goal setting and plan of care.     Thank you for this referral.  Dolly Vaughan, PT   Time Calculation: 20 mins

## 2020-09-08 LAB
ANION GAP SERPL CALC-SCNC: 2 MMOL/L (ref 5–15)
BUN SERPL-MCNC: 5 MG/DL (ref 6–20)
BUN/CREAT SERPL: 8 (ref 12–20)
CALCIUM SERPL-MCNC: 9.7 MG/DL (ref 8.5–10.1)
CHLORIDE SERPL-SCNC: 119 MMOL/L (ref 97–108)
CO2 SERPL-SCNC: 27 MMOL/L (ref 21–32)
COMMENT, HOLDF: NORMAL
CREAT SERPL-MCNC: 0.61 MG/DL (ref 0.7–1.3)
DATE LAST DOSE: NORMAL
GLUCOSE SERPL-MCNC: 83 MG/DL (ref 65–100)
LITHIUM SERPL-SCNC: 0.88 MMOL/L (ref 0.6–1.2)
POTASSIUM SERPL-SCNC: 4 MMOL/L (ref 3.5–5.1)
REPORTED DOSE,DOSE: NORMAL UNITS
REPORTED DOSE/TIME,TMG: NORMAL
SAMPLES BEING HELD,HOLD: NORMAL
SODIUM SERPL-SCNC: 148 MMOL/L (ref 136–145)

## 2020-09-08 PROCEDURE — 74011250636 HC RX REV CODE- 250/636: Performed by: FAMILY MEDICINE

## 2020-09-08 PROCEDURE — 97530 THERAPEUTIC ACTIVITIES: CPT

## 2020-09-08 PROCEDURE — 80048 BASIC METABOLIC PNL TOTAL CA: CPT

## 2020-09-08 PROCEDURE — 80178 ASSAY OF LITHIUM: CPT

## 2020-09-08 PROCEDURE — 74011250637 HC RX REV CODE- 250/637: Performed by: FAMILY MEDICINE

## 2020-09-08 PROCEDURE — 74011250637 HC RX REV CODE- 250/637: Performed by: INTERNAL MEDICINE

## 2020-09-08 PROCEDURE — 36415 COLL VENOUS BLD VENIPUNCTURE: CPT

## 2020-09-08 PROCEDURE — 87635 SARS-COV-2 COVID-19 AMP PRB: CPT

## 2020-09-08 PROCEDURE — 65660000000 HC RM CCU STEPDOWN

## 2020-09-08 RX ADMIN — QUETIAPINE FUMARATE 200 MG: 100 TABLET ORAL at 21:28

## 2020-09-08 RX ADMIN — QUETIAPINE FUMARATE 200 MG: 100 TABLET ORAL at 08:19

## 2020-09-08 RX ADMIN — MELATONIN 4.5 MG: at 21:25

## 2020-09-08 RX ADMIN — CLONIDINE HYDROCHLORIDE 0.1 MG: 0.1 TABLET ORAL at 17:37

## 2020-09-08 RX ADMIN — TOPIRAMATE 100 MG: 100 TABLET, FILM COATED ORAL at 08:20

## 2020-09-08 RX ADMIN — DIPHENHYDRAMINE HYDROCHLORIDE 50 MG: 25 CAPSULE ORAL at 21:28

## 2020-09-08 RX ADMIN — LORAZEPAM 1 MG: 0.5 TABLET ORAL at 05:29

## 2020-09-08 RX ADMIN — RISPERIDONE 2 MG: 1 TABLET ORAL at 08:20

## 2020-09-08 RX ADMIN — POTASSIUM CHLORIDE 20 MEQ: 750 TABLET, FILM COATED, EXTENDED RELEASE ORAL at 08:19

## 2020-09-08 RX ADMIN — LACTULOSE 30 ML: 20 SOLUTION ORAL at 21:29

## 2020-09-08 RX ADMIN — RISPERIDONE 4 MG: 3 TABLET ORAL at 21:27

## 2020-09-08 RX ADMIN — QUETIAPINE FUMARATE 200 MG: 100 TABLET ORAL at 16:20

## 2020-09-08 RX ADMIN — DIVALPROEX SODIUM 1000 MG: 500 TABLET, DELAYED RELEASE ORAL at 08:19

## 2020-09-08 RX ADMIN — ENOXAPARIN SODIUM 40 MG: 40 INJECTION SUBCUTANEOUS at 14:15

## 2020-09-08 RX ADMIN — ASPIRIN 81 MG: 81 TABLET, COATED ORAL at 08:20

## 2020-09-08 RX ADMIN — CLONIDINE HYDROCHLORIDE 0.1 MG: 0.1 TABLET ORAL at 08:20

## 2020-09-08 RX ADMIN — DIVALPROEX SODIUM 1000 MG: 500 TABLET, DELAYED RELEASE ORAL at 21:27

## 2020-09-08 RX ADMIN — TOPIRAMATE 100 MG: 100 TABLET, FILM COATED ORAL at 17:37

## 2020-09-08 RX ADMIN — DIVALPROEX SODIUM 1000 MG: 500 TABLET, DELAYED RELEASE ORAL at 16:20

## 2020-09-08 RX ADMIN — MELATONIN 4.5 MG: at 00:21

## 2020-09-08 RX ADMIN — POTASSIUM CHLORIDE 20 MEQ: 750 TABLET, FILM COATED, EXTENDED RELEASE ORAL at 17:37

## 2020-09-08 NOTE — PROGRESS NOTES
Physician Progress Note      Perez Larson  CSN #:                  [id number]  :                       1990  ADMIT DATE:       9/3/2020 7:39 PM  [address] [place] DATE:  RESPONDING  PROVIDER #:        LEONILA Painter MD          QUERY TEXT:    Pt admitted with lithium toxicity . Pt noted to have increased altered mental status superimposed upon mental retardation . If possible, please document in the progress notes and discharge summary if you are evaluating and / or treating any of the following: The medical record reflects the following:  Risk Factors: MR, AMS  Clinical Indicators:  9/3/2020 21:08  Potassium: 2.6 (LL)      9/3/2020 21:08  Lithium level: 2.18 [facility]    ED  He is with his counselor from a group home. His counselor says he is not eating, and that he is weak and drowsy. his doctor thinks he might have high lithium levels. -PN  Altered mental status  -Secondary to lithium toxicity and electrolyte derangements     neurology  Caretaker says currently he is approaching his baseline with the exception of the tremulousness throughout. Treatment: lab monitoring, IV fluids, neurology consult, psych consult    Thank you,  Gennie Schilder RN, [facility]   [phone]  Options provided:  -- Metabolic encephalopathy superimposed upon mental retardation  -- Toxic encephalopathy superimposed upon mental retardation  -- Encephalopathy due to medications or drugs superimposed upon mental retardation, Please specify  -- Toxic metabolic encephalopathy superimposed upon mental retardation  -- Other - I will add my own diagnosis  -- Disagree - Not applicable / Not valid  -- Disagree - Clinically unable to determine / Unknown  -- Refer to Clinical Documentation Reviewer    PROVIDER RESPONSE TEXT:    This patient has toxic encephalopathy superimposed upon mental retardation .     Query created by: Dominique Jamison on 2020 12:10 PM      Electronically signed by:  LEONILA Garcia MD 9/8/2020 6:18 PM

## 2020-09-08 NOTE — PROGRESS NOTES
Problem: Self Care Deficits Care Plan (Adult)  Goal: *Acute Goals and Plan of Care (Insert Text)  Description:   FUNCTIONAL STATUS PRIOR TO ADMISSION: patient non-verbal. Performing all basic ADLs with increased time, visual cues and familiarity of routine. Aid and home performs all instrumental ADLs. Patient enjoys leaving the house to feed ducks, Putt putt, etc.  Motivated by food and drinks in which aid has to use sparingly due to obesity (patient gestures at mouth for food). Does well with simple humor. Requires external cues to motivate patient, otherwise learned dependency. Recently increased weakness, falling getting in and out of tub. HOME SUPPORT: The patient lived with group home residents and care aid 1/1 5 hours each day M-Th. Occupational Therapy Goals  Initiated 9/7/2020  1. Patient will perform opening and closing ADL items 2/2 with minimal assistance/contact guard assist within 7 day(s). 2.  Patient will perform self feeding with setup only within 7 day(s). 3.  Patient will perform bathing with moderate A within 7 day(s). 4.  Patient will perform BSC toilet transfers with moderate assistance  within 7 day(s). 5.  Patient will perform all aspects of toileting with moderate assistance  within 7 day(s). Outcome: Progressing Towards Goal     OCCUPATIONAL THERAPY TREATMENT  Patient: Luann Chawla (12 y.o. male)  Date: 9/8/2020  Diagnosis: Lithium toxicity [T56.891A]  Altered mental status [R41.82]   <principal problem not specified>       Precautions: Bed Alarm  Chart, occupational therapy assessment, plan of care, and goals were reviewed. ASSESSMENT  Patient continues with skilled OT services and is progressing towards goals.   Pt is limited by baseline cognitive disability, non-verbal, functional transfers, ability to follow simple multi- step commands, impulsivity, poor safety awareness, carryover between sessions, intentional tremors, balance, activity tolerance, and behavioral management. 1:1 aid not present during today's session. Pt demonstrated understanding of simple 1 step commands and able to gesture simple needs but perseverative on catheter and required frequent redirection to participate in skilled task. Pt performing bed mobility with min A-supervision if motivated to perform but demonstrates poor safety awareness and impulsivity during session. As per RN report pt varies with level of physical A needed throughout day. Recommend SNF at d/c vs return to group home with 1:1 aid pending progression    Current Level of Function Impacting Discharge (ADLs): moderate A upper body ADLs, max A LB ADLs, min A transfer to chair     Other factors to consider for discharge: group home, 1:1 aid         PLAN :  Patient continues to benefit from skilled intervention to address the above impairments. Continue treatment per established plan of care. to address goals. Recommend with staff: HOBE for feeding, bed and chair alarms, transfer to chair with min-mod A    Recommend next OT session: Sitting EOB, transfer to chair    Recommendation for discharge: (in order for the patient to meet his/her long term goals)  Therapy up to 5 days/week in SNF setting or an intensive home health therapy program at group home with 1:1 aid pending progression in hospital    This discharge recommendation:  Has not yet been discussed the attending provider and/or case management    IF patient discharges home will need the following DME: TBD       SUBJECTIVE:   Patient stated Noooooooo.     OBJECTIVE DATA SUMMARY:   Cognitive/Behavioral Status:  Neurologic State: Alert  Orientation Level: Unable to verbalize  Cognition: Poor safety awareness;Decreased attention/concentration             Functional Mobility and Transfers for ADLs:  Bed Mobility:  Rolling: Supervision  Supine to Sit: Supervision  Scooting:  Moderate assistance    Transfers:             Balance:       ADL Intervention:   Pt received supine in bed with RN present as pt continuously calling out and yelling down mcelroy. Pt agreeable to therapy initially laughing and gesturing and able to follow simple 1 step commands. Pt began to perseverate on catheter and required max cues for redirection. OT provided repositioning for comfort and educated on need for condom cath. Pt able to perform bed mobility with overall sup but performed impulsively and was not following safety cues provided by therapist (pt moved quickly, attempted to climb over bed rail, moved before managing lines.) Pt returned to supine when receiving safety education and closed eyes and began to perseverate on turning tv on/off. Unable to re-engage in bed mobility, transfer, or other skilled task. Therapeutic Exercises:       Pain:  Unable to verbalize, no obvious pain symptoms noted    Activity Tolerance:   Poor  Please refer to the flowsheet for vital signs taken during this treatment. After treatment patient left in no apparent distress:   Supine in bed, Call bell within reach, Bed / chair alarm activated and Side rails x 3    COMMUNICATION/COLLABORATION:   The patients plan of care was discussed with: Registered nurse.      Nelly Real  Time Calculation: 10 mins

## 2020-09-08 NOTE — PROGRESS NOTES
[facility] Adult  Hospitalist Group                                                                                          Hospitalist Progress Note  Meir Olson MD  Answering service: [phone] from in house phone        Date of Service:  2020  NAME:  Lady Ruiz  :  1990  MRN:  [id number]      Admission Summary:     Patient presented to the ED from adult care home with reported altered mental status. Per report, patient is mostly non-verbal (with exception of incomprehensible speech) at baseline. Patient says some names and \"bye\" on occasion. Patient reportedly was not acting like himself and his mentation and behavior was noticeably altered. Per report, he has been drowsy of the past 2-3 weeks with decreased appetite, ataxia, and tremors. On arrival to the ED tonight, workup included labs including lithium level = 2.18.  K = 2.6. Patient was given KCL 40 mEq po and KCL 10 mEq IV run x 1. Patient notedly usually takes Lithium 50 mg po BID with his last dose given on 2020. ED requested admission to the hospitalist service at which time, I requested consultation with poison consult. Recommendations were to recheck lithium level q 4 hours and IV fluid infusion at 250 ml/hr. There were no reports of recent falls, head or neck trauma. Interval history / Subjective:       Patient has baseline mental retardation and cannot be communicated. I discussed with detail regarding patient condition with care giver. Answered his questions in details. consulted psych. Patient lives in group home as per care giver. He was concerned regarding new onset shakiness bilaterally  Discuss regarding neurology consultation. Agreement with plan  Lithium levels trending down    talked to guardian in detail. He wants to start lithium level on a lower dose around 500 mg.. He mentioned to me that he is trying to get outpatient psych appointment asap.  I told him regarding possible covid test required by correction. He was in agreement with that.   ordered COVID test today  Lithium level WNL     Assessment & Plan:     Lithium toxicity, improved   -Holding lithium, IV fluid per recommendation from poison control  -most recent lithium level 1.23  -psychiatry consulted , no additional recommendation  -lithium level WNL, will start on lithium medications on a lower dose close to discharge   Consulted neurology for new onset hand tremors? Shaking as per care giver , physical therapy recommended     Altered mental status, improving   -Secondary to lithium toxicity and electrolyte derangements  -Continue monitor    Hypertension  -Continue clonidine    History of psychosis  -On multiple psych medications, resume home meds  -Holding lithium    #Hypokalemia  IV replacement ordered    Obesity  -Patient follow-up for weight management    Code status: FULL  DVT prophylaxis: Lovenox    Care Plan discussed with: Patient/Family  Anticipated Disposition: SNF/LTC  Anticipated Discharge: 24 hours to 48 hours     Hospital Problems  Date Reviewed: 1/28/2020          Codes Class Noted POA    Altered mental status ICD-10-CM: R41.82  ICD-9-CM: 780.97  9/4/2020 Unknown        Lithium toxicity ICD-10-CM: W52.180V  ICD-9-CM: 985.8, E980.9  9/4/2020 Unknown                Review of Systems:   A comprehensive review of systems was negative except for that written in the HPI. Vital Signs:    Last 24hrs VS reviewed since prior progress note.  Most recent are:  Visit Vitals  /80 (BP 1 Location: Right arm, BP Patient Position: At rest)   Pulse 82   Temp 97.5 °F (36.4 °C)   Resp 16   Ht 6' 3\" (1.905 m)   Wt 126.8 kg (279 lb 8.7 oz)   SpO2 96%   BMI 34.94 kg/m²         Intake/Output Summary (Last 24 hours) at 9/8/2020 1733  Last data filed at 9/8/2020 1500  Gross per 24 hour   Intake 1000 ml   Output 4550 ml   Net -3550 ml        Physical Examination:             Constitutional:  No acute distress   ENT:  Oral mucosa moist, oropharynx benign. Resp:  CTA bilaterally. No wheezing/rhonchi/rales. No accessory muscle use   CV:  Regular rhythm, normal rate, no murmurs, gallops, rubs    GI:  Soft, non distended, non tender. normoactive bowel sounds, no hepatosplenomegaly     Musculoskeletal:  No edema, warm, 2+ pulses throughout    Neurologic:  Moves all extremities. Awake and alert     Skin:  Good turgor, no rashes or ulcers       Data Review:    Review and/or order of clinical lab test      Labs:     No results for input(s): WBC, HGB, HCT, PLT, HGBEXT, HCTEXT, PLTEXT, HGBEXT, HCTEXT, PLTEXT in the last 72 hours. Recent Labs     09/08/20  1055 09/06/20  0456   * 142   K 4.0 2.9*   * 114*   CO2 27 25   BUN 5* 4*   CREA 0.61* 0.77   GLU 83 100   CA 9.7 9.0     No results for input(s): ALT, AP, TBIL, TBILI, TP, ALB, GLOB, GGT, AML, LPSE in the last 72 hours. No lab exists for component: SGOT, GPT, AMYP, HLPSE  No results for input(s): INR, PTP, APTT, INREXT, INREXT in the last 72 hours. No results for input(s): FE, TIBC, PSAT, FERR in the last 72 hours. No results found for: FOL, RBCF   No results for input(s): PH, PCO2, PO2 in the last 72 hours. No results for input(s): CPK, CKNDX, TROIQ in the last 72 hours.     No lab exists for component: CPKMB  Lab Results   Component Value Date/Time    Cholesterol, total 157 11/21/2019 10:30 AM    HDL Cholesterol 44 11/21/2019 10:30 AM    LDL, calculated 97 11/21/2019 10:30 AM    Triglyceride 79 11/21/2019 10:30 AM     No results found for: GLUCPOC  No results found for: COLOR, APPRN, SPGRU, REFSG, KULDEEP, PROTU, GLUCU, KETU, BILU, UROU, ONIEL, LEUKU, GLUKE, EPSU, BACTU, WBCU, RBCU, CASTS, UCRY      Medications Reviewed:     Current Facility-Administered Medications   Medication Dose Route Frequency    potassium chloride SR (KLOR-CON 10) tablet 20 mEq  20 mEq Oral BID    acetaminophen (TYLENOL) tablet 650 mg  650 mg Oral Q6H PRN    Or    acetaminophen (TYLENOL) suppository 650 mg  650 mg Rectal Q6H PRN    cloNIDine HCL (CATAPRES) tablet 0.1 mg  0.1 mg Oral BID    aspirin delayed-release tablet 81 mg  81 mg Oral DAILY    topiramate (TOPAMAX) tablet 100 mg  100 mg Oral BID    LORazepam (ATIVAN) tablet 1 mg  1 mg Oral BID PRN    QUEtiapine (SEROquel) tablet 200 mg  200 mg Oral TID    lactulose (CHRONULAC) 10 gram/15 mL solution 45 mL  45 mL Oral DAILY    divalproex DR (DEPAKOTE) tablet 1,000 mg  1,000 mg Oral TID    risperiDONE (RisperDAL) tablet 4 mg  4 mg Oral QHS    risperiDONE (RisperDAL) tablet 2 mg  2 mg Oral DAILY    diphenhydrAMINE (BENADRYL) capsule 50 mg  50 mg Oral QHS    lactulose (CHRONULAC) 10 gram/15 mL solution 30 mL  30 mL Oral QHS    melatonin tablet 4.5 mg  4.5 mg Oral QHS    enoxaparin (LOVENOX) injection 40 mg  40 mg SubCUTAneous Q24H     ______________________________________________________________________  EXPECTED LENGTH OF STAY: 2d 7h  ACTUAL LENGTH OF STAY:          4                 Kiera Cooper MD

## 2020-09-08 NOTE — ROUTINE PROCESS
Perfect serve to Dr. Fabian Camarillo: We were finally able to get the patients labs around 10:50 this morning. We lost the IV and I have had a few people try. I just left a message with the PICC team to see if they could try.  
 
1:58 pm: Still unable to obtain IV access. Dr. Fabian Camarillo is aware.

## 2020-09-08 NOTE — PROGRESS NOTES
0438-Primary RN and charge RN attempted to place IV after 20G in left AC went bad. Attempts were unsuccessful. RN paged CCU to see if they could help with difficult IV placement and lab draw. 0700-RN paged CCU again to request assistance with blood draw and IV placement.  RN informed day shift RN of situation

## 2020-09-08 NOTE — PROGRESS NOTES
Bedside shift change report given to Sorin Jennings RN (oncoming nurse) by Jessica Stanley RN (offgoing nurse). Report included the following information SBAR, Kardex, Intake/Output, MAR and Recent Results.

## 2020-09-09 LAB
COVID-19, XGCOVT: NOT DETECTED
HEALTH STATUS, XMCV2T: NORMAL
SOURCE, COVRS: NORMAL
SPECIMEN SOURCE, FCOV2M: NORMAL
SPECIMEN TYPE, XMCV1T: NORMAL

## 2020-09-09 PROCEDURE — 65660000000 HC RM CCU STEPDOWN

## 2020-09-09 PROCEDURE — 74011250637 HC RX REV CODE- 250/637: Performed by: INTERNAL MEDICINE

## 2020-09-09 PROCEDURE — 74011250637 HC RX REV CODE- 250/637: Performed by: FAMILY MEDICINE

## 2020-09-09 RX ORDER — DEXTROSE MONOHYDRATE 50 MG/ML
50 INJECTION, SOLUTION INTRAVENOUS CONTINUOUS
Status: DISCONTINUED | OUTPATIENT
Start: 2020-09-09 | End: 2020-09-13 | Stop reason: HOSPADM

## 2020-09-09 RX ORDER — LITHIUM CARBONATE 300 MG/1
300 TABLET, FILM COATED, EXTENDED RELEASE ORAL
Status: DISCONTINUED | OUTPATIENT
Start: 2020-09-09 | End: 2020-09-13 | Stop reason: HOSPADM

## 2020-09-09 RX ADMIN — LACTULOSE 45 ML: 20 SOLUTION ORAL at 08:37

## 2020-09-09 RX ADMIN — RISPERIDONE 4 MG: 3 TABLET ORAL at 23:10

## 2020-09-09 RX ADMIN — DIVALPROEX SODIUM 1000 MG: 500 TABLET, DELAYED RELEASE ORAL at 23:09

## 2020-09-09 RX ADMIN — LORAZEPAM 1 MG: 0.5 TABLET ORAL at 04:25

## 2020-09-09 RX ADMIN — TOPIRAMATE 100 MG: 100 TABLET, FILM COATED ORAL at 08:37

## 2020-09-09 RX ADMIN — LORAZEPAM 1 MG: 0.5 TABLET ORAL at 12:47

## 2020-09-09 RX ADMIN — RISPERIDONE 2 MG: 1 TABLET ORAL at 08:34

## 2020-09-09 RX ADMIN — QUETIAPINE FUMARATE 200 MG: 100 TABLET ORAL at 16:07

## 2020-09-09 RX ADMIN — ACETAMINOPHEN 650 MG: 325 TABLET ORAL at 09:25

## 2020-09-09 RX ADMIN — QUETIAPINE FUMARATE 200 MG: 100 TABLET ORAL at 23:09

## 2020-09-09 RX ADMIN — DIVALPROEX SODIUM 1000 MG: 500 TABLET, DELAYED RELEASE ORAL at 16:07

## 2020-09-09 RX ADMIN — CLONIDINE HYDROCHLORIDE 0.1 MG: 0.1 TABLET ORAL at 08:34

## 2020-09-09 RX ADMIN — DIPHENHYDRAMINE HYDROCHLORIDE 50 MG: 25 CAPSULE ORAL at 23:09

## 2020-09-09 RX ADMIN — QUETIAPINE FUMARATE 200 MG: 100 TABLET ORAL at 08:34

## 2020-09-09 RX ADMIN — DIVALPROEX SODIUM 1000 MG: 500 TABLET, DELAYED RELEASE ORAL at 08:37

## 2020-09-09 RX ADMIN — ASPIRIN 81 MG: 81 TABLET, COATED ORAL at 08:34

## 2020-09-09 RX ADMIN — ACETAMINOPHEN 650 MG: 325 TABLET ORAL at 18:29

## 2020-09-09 RX ADMIN — POTASSIUM CHLORIDE 20 MEQ: 750 TABLET, FILM COATED, EXTENDED RELEASE ORAL at 18:29

## 2020-09-09 RX ADMIN — POTASSIUM CHLORIDE 20 MEQ: 750 TABLET, FILM COATED, EXTENDED RELEASE ORAL at 08:36

## 2020-09-09 RX ADMIN — TOPIRAMATE 100 MG: 100 TABLET, FILM COATED ORAL at 18:29

## 2020-09-09 RX ADMIN — CLONIDINE HYDROCHLORIDE 0.1 MG: 0.1 TABLET ORAL at 18:29

## 2020-09-09 RX ADMIN — MELATONIN 4.5 MG: at 23:10

## 2020-09-09 RX ADMIN — LACTULOSE 30 ML: 20 SOLUTION ORAL at 23:09

## 2020-09-09 NOTE — PROGRESS NOTES
[facility]                                                                                          Hospitalist Progress Note  Bob Billingsley MD  Answering service: [phone] from in house phone        Date of Service:  2020  NAME:  Marcos Thompson  :  1990  MRN:  [id number]      Admission Summary:     Patient presented to the ED from adult longterm with reported altered mental status. Per report, patient is mostly non-verbal (with exception of incomprehensible speech) at baseline. Patient says some names and \"bye\" on occasion. Patient reportedly was not acting like himself and his mentation and behavior was noticeably altered. Per report, he has been drowsy of the past 2-3 weeks with decreased appetite, ataxia, and tremors. On arrival to the ED tonight, workup included labs including lithium level = 2.18.  K = 2.6. Patient was given KCL 40 mEq po and KCL 10 mEq IV run x 1. Patient notedly usually takes Lithium 50 mg po BID with his last dose given on 2020. ED requested admission to the hospitalist service at which time, I requested consultation with poison consult. Recommendations were to recheck lithium level q 4 hours and IV fluid infusion at 250 ml/hr. There were no reports of recent falls, head or neck trauma. Interval history / Subjective:       Patient has baseline mental retardation and cannot be communicated. I discussed with detail regarding patient condition with care giver. Answered his questions in details. consulted psych. Patient lives in group home as per care giver. He was concerned regarding new onset shakiness bilaterally  Discuss regarding neurology consultation. Agreement with plan  Lithium levels trending down    talked to guardian in detail. He wants to start lithium level on a lower dose around 500 mg.. He mentioned to me that he is trying to get outpatient psych appointment asap.  I told him regarding possible covid test required by [facility]. He was in agreement with that.   ordered COVID test today  Lithium level WNL. Will restart lithium on on a lower dose and will recheck tomorrow  Hypernatremia on iv fluids. Discused with pharmacy who got in touch with behavioral health NP,   Recommended to start at 300 low dose and recheck lithium level. Assessment & Plan:     Lithium toxicity, improved   -Holding lithium, IV fluid per recommendation from poison control  -most recent lithium level 1.23  -psychiatry consulted , no additional recommendation  -lithium level WNL, will start on lithium medications on a lower dose close to discharge   Consulted neurology for new onset hand tremors? Shaking as per care giver , physical therapy recommended   Will restart lithium on lower dose and recheck lithium tomorrow  patient needs follow up regarding lithium level check and further adjustment     Altered mental status, improving   -Secondary to lithium toxicity and electrolyte derangements  -Continue monitor  Need to discuss with care giver / Ellen Trivedi before discharge    Hypertension  -Continue clonidine    History of psychosis  -On multiple psych medications, resume home meds  -  Will start lithium on a lower dose and will recheck level    #Hypokalemia  IV replacement ordered    Obesity  -Patient follow-up for weight management    Code status: FULL  DVT prophylaxis: Lovenox    Care Plan discussed with: Patient/Family  Anticipated Disposition: SNF/LTC  Anticipated Discharge: 24 hours to 48 hours     Hospital Problems  Date Reviewed: 1/28/2020          Codes Class Noted POA    Altered mental status ICD-10-CM: R41.82  ICD-9-CM: 780.97  9/4/2020 Unknown        Lithium toxicity ICD-10-CM: U77.467W  ICD-9-CM: 985.8, E980.9  9/4/2020 Unknown                Review of Systems:   A comprehensive review of systems was negative except for that written in the HPI. Vital Signs:    Last 24hrs VS reviewed since prior progress note.  Most recent are:  Visit Vitals  /89   Pulse 94   Temp 98.2 °F (36.8 °C)   Resp 16   Ht 6' 3\" (1.905 m)   Wt 126.8 kg (279 lb 8.7 oz)   SpO2 94%   BMI 34.94 kg/m²         Intake/Output Summary (Last 24 hours) at 9/9/2020 1651  Last data filed at 9/9/2020 1234  Gross per 24 hour   Intake 720 ml   Output 6150 ml   Net -5430 ml        Physical Examination:             Constitutional:  No acute distress   ENT:  Oral mucosa moist, oropharynx benign. Resp:  CTA bilaterally. No wheezing/rhonchi/rales. No accessory muscle use   CV:  Regular rhythm, normal rate, no murmurs, gallops, rubs    GI:  Soft, non distended, non tender. normoactive bowel sounds, no hepatosplenomegaly     Musculoskeletal:  No edema, warm, 2+ pulses throughout    Neurologic:  Moves all extremities. Awake and alert     Skin:  Good turgor, no rashes or ulcers       Data Review:    Review and/or order of clinical lab test      Labs:     No results for input(s): WBC, HGB, HCT, PLT, HGBEXT, HCTEXT, PLTEXT, HGBEXT, HCTEXT, PLTEXT in the last 72 hours. Recent Labs     09/08/20  1055   *   K 4.0   *   CO2 27   BUN 5*   CREA 0.61*   GLU 83   CA 9.7     No results for input(s): ALT, AP, TBIL, TBILI, TP, ALB, GLOB, GGT, AML, LPSE in the last 72 hours. No lab exists for component: SGOT, GPT, AMYP, HLPSE  No results for input(s): INR, PTP, APTT, INREXT, INREXT in the last 72 hours. No results for input(s): FE, TIBC, PSAT, FERR in the last 72 hours. No results found for: FOL, RBCF   No results for input(s): PH, PCO2, PO2 in the last 72 hours. No results for input(s): CPK, CKNDX, TROIQ in the last 72 hours.     No lab exists for component: CPKMB  Lab Results   Component Value Date/Time    Cholesterol, total 157 11/21/2019 10:30 AM    HDL Cholesterol 44 11/21/2019 10:30 AM    LDL, calculated 97 11/21/2019 10:30 AM    Triglyceride 79 11/21/2019 10:30 AM     No results found for: GLUCPOC  No results found for: COLOR, APPRN, SPGRU, REFSG, KULDEEP, PROTU, GLUCU, KETU, BILU, UROU, ONIEL, LEUKU, GLUKE, EPSU, BACTU, WBCU, RBCU, CASTS, UCRY      Medications Reviewed:     Current Facility-Administered Medications   Medication Dose Route Frequency    dextrose 5% infusion  75 mL/hr IntraVENous CONTINUOUS    potassium chloride SR (KLOR-CON 10) tablet 20 mEq  20 mEq Oral BID    acetaminophen (TYLENOL) tablet 650 mg  650 mg Oral Q6H PRN    Or    acetaminophen (TYLENOL) suppository 650 mg  650 mg Rectal Q6H PRN    cloNIDine HCL (CATAPRES) tablet 0.1 mg  0.1 mg Oral BID    aspirin delayed-release tablet 81 mg  81 mg Oral DAILY    topiramate (TOPAMAX) tablet 100 mg  100 mg Oral BID    LORazepam (ATIVAN) tablet 1 mg  1 mg Oral BID PRN    QUEtiapine (SEROquel) tablet 200 mg  200 mg Oral TID    lactulose (CHRONULAC) 10 gram/15 mL solution 45 mL  45 mL Oral DAILY    divalproex DR (DEPAKOTE) tablet 1,000 mg  1,000 mg Oral TID    risperiDONE (RisperDAL) tablet 4 mg  4 mg Oral QHS    risperiDONE (RisperDAL) tablet 2 mg  2 mg Oral DAILY    diphenhydrAMINE (BENADRYL) capsule 50 mg  50 mg Oral QHS    lactulose (CHRONULAC) 10 gram/15 mL solution 30 mL  30 mL Oral QHS    melatonin tablet 4.5 mg  4.5 mg Oral QHS    enoxaparin (LOVENOX) injection 40 mg  40 mg SubCUTAneous Q24H     ______________________________________________________________________  EXPECTED LENGTH OF STAY: 2d 7h  ACTUAL LENGTH OF STAY:          aYnn Ellsworth MD

## 2020-09-09 NOTE — ACP (ADVANCE CARE PLANNING)
TRANSITIONS OF CARE PLAN:     1. Patient will be discharged to [facility] tomorrow    2. The facility will provide transportation      3. Disposition- Home Care for PT/OT- Referrals sent to [facility], At [address], [facility], [facility]. and [address] . Awaiting responses. Needs a home care order     COVID-19 test pending    CM to call  Hossein Thea: [phone]) in am of plan    Legal guardian Dhaval Linda: [phone])     CM called Tanya Loo, , [facility] to discuss discharge planning and choice of SN Facilities the [facility] [place] uses for their specific patients. CM left a VM message. Awaiting a return call. 11:00 am- SULEMA spoke with Tanya Loo regarding discharge planning, he recommended that patient returns to the facility as it's in the best interest of the patient to be in an environment with staff he is familiar with. CM offered a choice of HHC, he did not have a preference. Patient needs a HHC order for PT/OT prior to discharge. Monique Sandoval MSA, RN, CRM.    4:00 pm. CM notified Tanya Loo of discharge in am. CM to notify him of discharge    Y.  Brad Burns MSA, RN,CRM

## 2020-09-09 NOTE — PROGRESS NOTES
Physical Therapy  9/9/2020    Chart reviewed. Attempted to see pt this afternoon, however pt's caregiver reported pt soiled himself and needs to be cleaned/washed up. RN aware. Will check back at later time vs tomorrow as able and appropriate.      Isabela Means, PTA

## 2020-09-09 NOTE — PROGRESS NOTES
Bedside shift change report given to Henry Bucio (oncoming nurse) by Yesenia Ring RN (offgoing nurse). Report included the following information SBAR, Kardex, ED Summary, Intake/Output and MAR.

## 2020-09-09 NOTE — PROGRESS NOTES
Bedside shift change report given to Candi Chopra (oncoming nurse) by Aisha Bray (offgoing nurse). Report included the following information SBAR.

## 2020-09-09 NOTE — ROUTINE PROCESS
Bedside and Verbal shift change report given to MIGDALIA Feliciano (oncoming nurse) by Luis Eduardo Horvath (offgoing nurse). Report included the following information SBAR, Kardex and Recent Results.

## 2020-09-10 ENCOUNTER — HOME HEALTH ADMISSION (OUTPATIENT)
Dept: HOME HEALTH SERVICES | Facility: HOME HEALTH | Age: 30
End: 2020-09-10
Payer: MEDICAID

## 2020-09-10 PROCEDURE — 74011250637 HC RX REV CODE- 250/637: Performed by: FAMILY MEDICINE

## 2020-09-10 PROCEDURE — 74011250636 HC RX REV CODE- 250/636: Performed by: FAMILY MEDICINE

## 2020-09-10 PROCEDURE — 65660000000 HC RM CCU STEPDOWN

## 2020-09-10 PROCEDURE — 74011250636 HC RX REV CODE- 250/636: Performed by: INTERNAL MEDICINE

## 2020-09-10 PROCEDURE — 74011250637 HC RX REV CODE- 250/637: Performed by: INTERNAL MEDICINE

## 2020-09-10 RX ADMIN — DEXTROSE MONOHYDRATE 75 ML/HR: 5 INJECTION, SOLUTION INTRAVENOUS at 18:48

## 2020-09-10 RX ADMIN — TOPIRAMATE 100 MG: 100 TABLET, FILM COATED ORAL at 09:41

## 2020-09-10 RX ADMIN — LACTULOSE 45 ML: 20 SOLUTION ORAL at 09:43

## 2020-09-10 RX ADMIN — DIPHENHYDRAMINE HYDROCHLORIDE 50 MG: 25 CAPSULE ORAL at 23:08

## 2020-09-10 RX ADMIN — RISPERIDONE 4 MG: 3 TABLET ORAL at 23:08

## 2020-09-10 RX ADMIN — DIVALPROEX SODIUM 1000 MG: 500 TABLET, DELAYED RELEASE ORAL at 23:08

## 2020-09-10 RX ADMIN — QUETIAPINE FUMARATE 200 MG: 100 TABLET ORAL at 23:08

## 2020-09-10 RX ADMIN — DIVALPROEX SODIUM 1000 MG: 500 TABLET, DELAYED RELEASE ORAL at 16:58

## 2020-09-10 RX ADMIN — CLONIDINE HYDROCHLORIDE 0.1 MG: 0.1 TABLET ORAL at 17:02

## 2020-09-10 RX ADMIN — RISPERIDONE 2 MG: 1 TABLET ORAL at 09:43

## 2020-09-10 RX ADMIN — MELATONIN 4.5 MG: at 23:08

## 2020-09-10 RX ADMIN — ENOXAPARIN SODIUM 40 MG: 40 INJECTION SUBCUTANEOUS at 14:34

## 2020-09-10 RX ADMIN — TOPIRAMATE 100 MG: 100 TABLET, FILM COATED ORAL at 17:02

## 2020-09-10 RX ADMIN — QUETIAPINE FUMARATE 200 MG: 100 TABLET ORAL at 16:56

## 2020-09-10 RX ADMIN — ASPIRIN 81 MG: 81 TABLET, COATED ORAL at 09:39

## 2020-09-10 RX ADMIN — LITHIUM CARBONATE 300 MG: 300 TABLET, EXTENDED RELEASE ORAL at 23:08

## 2020-09-10 RX ADMIN — LACTULOSE 30 ML: 20 SOLUTION ORAL at 23:08

## 2020-09-10 RX ADMIN — DIVALPROEX SODIUM 1000 MG: 500 TABLET, DELAYED RELEASE ORAL at 09:41

## 2020-09-10 RX ADMIN — LORAZEPAM 1 MG: 0.5 TABLET ORAL at 07:14

## 2020-09-10 RX ADMIN — CLONIDINE HYDROCHLORIDE 0.1 MG: 0.1 TABLET ORAL at 09:39

## 2020-09-10 RX ADMIN — QUETIAPINE FUMARATE 200 MG: 100 TABLET ORAL at 09:39

## 2020-09-10 NOTE — PROGRESS NOTES
Neurology Progress Note    Patient ID:  Amira Escobedo  [id number]  25 y.o.  1990    Chief Complaint:Tremors     Subjective:   Pasquale Morrison is a 34-year-old gentleman here from a group home for lithium toxicity. Neurology following for tremors . Abnormal movements still present. Caregiver no longer at the bedside. Nurse notes she had to receive ativan overnight due to his outbursts and trying to get out of the bed. Objective: All records in [facility] reviewed and noted    ROS:  Unable to perform ROS; Mental acuity    Meds:  Current Facility-Administered Medications   Medication Dose Route Frequency    dextrose 5% infusion  75 mL/hr IntraVENous CONTINUOUS    lithium carbonate SR (LITHOBID) tablet 300 mg  300 mg Oral QHS    acetaminophen (TYLENOL) tablet 650 mg  650 mg Oral Q6H PRN    Or    acetaminophen (TYLENOL) suppository 650 mg  650 mg Rectal Q6H PRN    cloNIDine HCL (CATAPRES) tablet 0.1 mg  0.1 mg Oral BID    aspirin delayed-release tablet 81 mg  81 mg Oral DAILY    topiramate (TOPAMAX) tablet 100 mg  100 mg Oral BID    LORazepam (ATIVAN) tablet 1 mg  1 mg Oral BID PRN    QUEtiapine (SEROquel) tablet 200 mg  200 mg Oral TID    lactulose (CHRONULAC) 10 gram/15 mL solution 45 mL  45 mL Oral DAILY    divalproex DR (DEPAKOTE) tablet 1,000 mg  1,000 mg Oral TID    risperiDONE (RisperDAL) tablet 4 mg  4 mg Oral QHS    risperiDONE (RisperDAL) tablet 2 mg  2 mg Oral DAILY    diphenhydrAMINE (BENADRYL) capsule 50 mg  50 mg Oral QHS    lactulose (CHRONULAC) 10 gram/15 mL solution 30 mL  30 mL Oral QHS    melatonin tablet 4.5 mg  4.5 mg Oral QHS    enoxaparin (LOVENOX) injection 40 mg  40 mg SubCUTAneous Q24H       Imaging:  MRI Results (most recent):      Lab Review   No results found for this or any previous visit (from the past 24 hour(s)).     Exam:  Visit Vitals  /79 (BP 1 Location: Left arm)   Pulse 86   Temp 97.6 °F (36.4 °C)   Resp 16   Ht 6' 3\" (1.905 m)   Wt 126.8 kg (279 lb 8.7 oz)   SpO2 100%   BMI 34.94 kg/m²     Gen: In the bed toss and turning. Requesting more  In his cup  CV: RRR  Lungs: non labored breathing  Abd: non distending  Neuro: Alert, answer yes to every question  CN II-XII: face symmetric. Follow some commands   Motor: Move all extremities some tremulousness of the arms but no clear tremor. Gait: deferred     Assessment:     Patient Active Problem List   Diagnosis Code    Hyperlipidemia E78.5    Psychiatric diagnosis F99    IGT (impaired glucose tolerance) R73.02    Obesity (BMI 30-39. 9) E66.9    Intellectual disability F79    Severe obesity (BMI 35.0-39. 9) with comorbidity ([facility] [address] E66.01    Altered mental status R41.82    Lithium toxicity T56.891A     Plan:   69-year-old gentleman with developmental delay on multiple psychotropics who presented with lithium toxicity, now with tremulousness likely due to debility. Continue PT. Thank you very much for this consultation. No further neurologic recommendations at this time. Will sign off but please call with questions.         Signed:  Chanel Locke NP  9/10/2020  11:03 AM

## 2020-09-10 NOTE — PROGRESS NOTES
VENECIA- Return to [address] with [facility]. CM noted that per therapy, this pt could benefit from home health PT. [facility] order obtained. CM reviewed all of the referrals sent out to  agencies and none of them can accept this pt. SULEMA spoke with Blake Cardenas with [facility]. She stated that they can accept this pt if start of care can be delayed until Monday, 9/14/2020. SULEMA spoke with Dr. Tania Nguyen about this and he is in agreement.  SULEMA placed this information on pt's AVS. Asya Mendoza

## 2020-09-10 NOTE — ROUTINE PROCESS
Bedside shift change report given to Pebbles Jon RN (oncoming nurse) by Roshan Sarkar RN (offgoing nurse). Report included the following information SBAR, Kardex, Intake/Output, MAR and Recent Results.

## 2020-09-10 NOTE — PROGRESS NOTES
Occupational Therapy    Chart reviewed and spoke with RN prior to session. RN reports pt had received adivan and seroquel earlier this morning as pt was restless and crying out. Pt received fast asleep. Pt arousable to voice but kept closing eyes responding \"yes\" to feeling tired and rolled away from OT. Pt requesting ginger ale and OT left room to speak to RN regarding drink and returned to find pt fast asleep again. Will attempt to f/up as able.     Thank you,  Page Tamez OTR/L

## 2020-09-10 NOTE — PROGRESS NOTES
[facility]                                                                                          Hospitalist Progress Note  Dirk Nguyen MD  Answering service: [phone] from in house phone        Date of Service:  9/10/2020  NAME:  Leigh Leblanc YOB: 1990  MRN:  [id number]      Admission Summary:     Patient presented to the ED from adult senior care with reported altered mental status.  Per report, patient is mostly non-verbal (with exception of incomprehensible speech) at baseline.  Patient says some names and \"bye\" on occasion.  Patient reportedly was not acting like himself and his mentation and behavior was noticeably altered.  Per report, he has been drowsy of the past 2-3 weeks with decreased appetite, ataxia, and tremors.  On arrival to the ED tonight, workup included labs including lithium level = 2.18.  K = 2.6.  Patient was given KCL 40 mEq po and KCL 10 mEq IV run x 1.  Patient notedly usually takes Lithium 50 mg po BID with his last dose given on 2020.  ED requested admission to the hospitalist service at which time, I requested consultation with poison consult.  Recommendations were to recheck lithium level q 4 hours and IV fluid infusion at 250 ml/hr. [facility] were no reports of recent falls, head or neck trauma.         Interval history / Subjective:     Patient is sleepy but wakeful, non verbal,      Assessment & Plan:     Lithium toxicity, improved   -lithium was held, on IV fluid per recommendation from poison control  -psychiatry consulted , no additional recommendation  -lithium level WNL, restarted on lithium medications on a lower dose 300 mg at bedtime on   -lithium level was 0.88 on     Tremulousness likely due to debility  -seen by neurologist and recommended PT     Altered mental status, improving   -Secondary to lithium toxicity and electrolyte derangements  -continue neuro check and supportive care     HTN  -BP normal, continue clonidine and monitor BP     History of psychosis  -On multiple psych medications, resume home meds; risperidone, seroquel, lithium, topamax and depakote      Hypokalemia  -replaced and resolved    Hypernatremia  -Na 148, continue D5W and repeat BMP in am     Obesity  -Patient follow-up for weight management     COVID 19 test negative on 9/10      Code status: Full Code  DVT prophylaxis: Lovenox    Care Plan discussed with: Patient/Family, Nurse and   Anticipated Disposition: Group Home  Anticipated Discharge: Greater than 48 hours     Hospital Problems  Date Reviewed: 1/28/2020          Codes Class Noted POA    Altered mental status ICD-10-CM: R41.82  ICD-9-CM: 780.97  9/4/2020 Unknown        Lithium toxicity ICD-10-CM: T67.589B  ICD-9-CM: 985.8, E980.9  9/4/2020 Unknown              Vital Signs:    Last 24hrs VS reviewed since prior progress note. Most recent are:  Visit Vitals  /77   Pulse 91   Temp 97.4 °F (36.3 °C)   Resp 16   Ht 6' 3\" (1.905 m)   Wt 126.8 kg (279 lb 8.7 oz)   SpO2 97%   BMI 34.94 kg/m²       No intake or output data in the 24 hours ending 09/10/20 1517     Physical Examination:             Constitutional:  No acute distress, cooperative, pleasant    ENT:  Oral mucosa moist, oropharynx benign. Resp:  CTA bilaterally. No wheezing/rhonchi/rales. No accessory muscle use   CV:  Regular rhythm, normal rate, no murmurs, gallops, rubs    GI:  Soft, non distended, non tender. normoactive bowel sounds, no hepatosplenomegaly     Musculoskeletal:  No edema     Neurologic: Sleepy but wakeful, non verbal     Skin:  Good turgor, no rashes or ulcers       Data Review:    Review and/or order of clinical lab test  Review and/or order of tests in the radiology section of CPT  Review and/or order of tests in the medicine section of CPT      Labs:   No results for input(s): WBC, HGB, HCT, PLT, HGBEXT, HCTEXT, PLTEXT in the last 72 hours.   Recent Labs     09/08/20  1055   NA 148*   K 4.0   *   CO2 27   BUN 5*   CREA 0.61*   GLU 83   CA 9.7     No results for input(s): ALT, AP, TBIL, TBILI, TP, ALB, GLOB, GGT, AML, LPSE in the last 72 hours. No lab exists for component: SGOT, GPT, AMYP, HLPSE  No results for input(s): INR, PTP, APTT, INREXT in the last 72 hours. No results for input(s): FE, TIBC, PSAT, FERR in the last 72 hours. No results found for: FOL, RBCF   No results for input(s): PH, PCO2, PO2 in the last 72 hours. No results for input(s): CPK, CKNDX, TROIQ in the last 72 hours.     No lab exists for component: CPKMB  Lab Results   Component Value Date/Time    Cholesterol, total 157 11/21/2019 10:30 AM    HDL Cholesterol 44 11/21/2019 10:30 AM    LDL, calculated 97 11/21/2019 10:30 AM    Triglyceride 79 11/21/2019 10:30 AM     No results found for: GLUCPOC  No results found for: COLOR, APPRN, SPGRU, REFSG, KULDEEP, PROTU, GLUCU, KETU, BILU, UROU, ONIEL, LEUKU, GLUKE, EPSU, BACTU, WBCU, RBCU, CASTS, UCRY      Medications Reviewed:     Current Facility-Administered Medications   Medication Dose Route Frequency    dextrose 5% infusion  75 mL/hr IntraVENous CONTINUOUS    lithium carbonate SR (LITHOBID) tablet 300 mg  300 mg Oral QHS    acetaminophen (TYLENOL) tablet 650 mg  650 mg Oral Q6H PRN    Or    acetaminophen (TYLENOL) suppository 650 mg  650 mg Rectal Q6H PRN    cloNIDine HCL (CATAPRES) tablet 0.1 mg  0.1 mg Oral BID    aspirin delayed-release tablet 81 mg  81 mg Oral DAILY    topiramate (TOPAMAX) tablet 100 mg  100 mg Oral BID    LORazepam (ATIVAN) tablet 1 mg  1 mg Oral BID PRN    QUEtiapine (SEROquel) tablet 200 mg  200 mg Oral TID    lactulose (CHRONULAC) 10 gram/15 mL solution 45 mL  45 mL Oral DAILY    divalproex DR (DEPAKOTE) tablet 1,000 mg  1,000 mg Oral TID    risperiDONE (RisperDAL) tablet 4 mg  4 mg Oral QHS    risperiDONE (RisperDAL) tablet 2 mg  2 mg Oral DAILY    diphenhydrAMINE (BENADRYL) capsule 50 mg  50 mg Oral QHS    lactulose (CHRONULAC) 10 gram/15 mL solution 30 mL  30 mL Oral QHS    melatonin tablet 4.5 mg  4.5 mg Oral QHS    enoxaparin (LOVENOX) injection 40 mg  40 mg SubCUTAneous Q24H     ______________________________________________________________________  EXPECTED LENGTH OF STAY: 2d 7h  ACTUAL LENGTH OF STAY:          6                 Tj Urias MD

## 2020-09-11 LAB
ALBUMIN SERPL-MCNC: 2.5 G/DL (ref 3.5–5)
ALBUMIN/GLOB SERPL: 0.7 {RATIO} (ref 1.1–2.2)
ALP SERPL-CCNC: 80 U/L (ref 45–117)
ALT SERPL-CCNC: 16 U/L (ref 12–78)
ANION GAP SERPL CALC-SCNC: 4 MMOL/L (ref 5–15)
AST SERPL-CCNC: 25 U/L (ref 15–37)
BILIRUB SERPL-MCNC: 0.3 MG/DL (ref 0.2–1)
BUN SERPL-MCNC: 3 MG/DL (ref 6–20)
BUN/CREAT SERPL: 5 (ref 12–20)
CALCIUM SERPL-MCNC: 8.9 MG/DL (ref 8.5–10.1)
CHLORIDE SERPL-SCNC: 106 MMOL/L (ref 97–108)
CO2 SERPL-SCNC: 29 MMOL/L (ref 21–32)
CREAT SERPL-MCNC: 0.63 MG/DL (ref 0.7–1.3)
ERYTHROCYTE [DISTWIDTH] IN BLOOD BY AUTOMATED COUNT: 17.5 % (ref 11.5–14.5)
GLOBULIN SER CALC-MCNC: 3.8 G/DL (ref 2–4)
GLUCOSE SERPL-MCNC: 64 MG/DL (ref 65–100)
HCT VFR BLD AUTO: 38 % (ref 36.6–50.3)
HGB BLD-MCNC: 11.3 G/DL (ref 12.1–17)
MCH RBC QN AUTO: 30.6 PG (ref 26–34)
MCHC RBC AUTO-ENTMCNC: 29.7 G/DL (ref 30–36.5)
MCV RBC AUTO: 103 FL (ref 80–99)
NRBC # BLD: 0 K/UL (ref 0–0.01)
NRBC BLD-RTO: 0 PER 100 WBC
PLATELET # BLD AUTO: 156 K/UL (ref 150–400)
PMV BLD AUTO: 10.6 FL (ref 8.9–12.9)
POTASSIUM SERPL-SCNC: 4 MMOL/L (ref 3.5–5.1)
PROT SERPL-MCNC: 6.3 G/DL (ref 6.4–8.2)
RBC # BLD AUTO: 3.69 M/UL (ref 4.1–5.7)
SODIUM SERPL-SCNC: 139 MMOL/L (ref 136–145)
WBC # BLD AUTO: 4.9 K/UL (ref 4.1–11.1)

## 2020-09-11 PROCEDURE — 74011250637 HC RX REV CODE- 250/637: Performed by: FAMILY MEDICINE

## 2020-09-11 PROCEDURE — 97530 THERAPEUTIC ACTIVITIES: CPT

## 2020-09-11 PROCEDURE — 74011250637 HC RX REV CODE- 250/637: Performed by: INTERNAL MEDICINE

## 2020-09-11 PROCEDURE — 36415 COLL VENOUS BLD VENIPUNCTURE: CPT

## 2020-09-11 PROCEDURE — 74011250636 HC RX REV CODE- 250/636: Performed by: FAMILY MEDICINE

## 2020-09-11 PROCEDURE — 85027 COMPLETE CBC AUTOMATED: CPT

## 2020-09-11 PROCEDURE — 80053 COMPREHEN METABOLIC PANEL: CPT

## 2020-09-11 PROCEDURE — 65660000000 HC RM CCU STEPDOWN

## 2020-09-11 PROCEDURE — 97535 SELF CARE MNGMENT TRAINING: CPT

## 2020-09-11 RX ADMIN — QUETIAPINE FUMARATE 200 MG: 100 TABLET ORAL at 21:49

## 2020-09-11 RX ADMIN — MELATONIN 4.5 MG: at 21:48

## 2020-09-11 RX ADMIN — TOPIRAMATE 100 MG: 100 TABLET, FILM COATED ORAL at 19:11

## 2020-09-11 RX ADMIN — DIPHENHYDRAMINE HYDROCHLORIDE 50 MG: 25 CAPSULE ORAL at 21:49

## 2020-09-11 RX ADMIN — CLONIDINE HYDROCHLORIDE 0.1 MG: 0.1 TABLET ORAL at 19:11

## 2020-09-11 RX ADMIN — QUETIAPINE FUMARATE 200 MG: 100 TABLET ORAL at 11:40

## 2020-09-11 RX ADMIN — DIVALPROEX SODIUM 1000 MG: 500 TABLET, DELAYED RELEASE ORAL at 11:41

## 2020-09-11 RX ADMIN — CLONIDINE HYDROCHLORIDE 0.1 MG: 0.1 TABLET ORAL at 11:40

## 2020-09-11 RX ADMIN — RISPERIDONE 4 MG: 3 TABLET ORAL at 23:40

## 2020-09-11 RX ADMIN — LITHIUM CARBONATE 300 MG: 300 TABLET, EXTENDED RELEASE ORAL at 22:00

## 2020-09-11 RX ADMIN — RISPERIDONE 2 MG: 1 TABLET ORAL at 11:40

## 2020-09-11 RX ADMIN — LACTULOSE 45 ML: 20 SOLUTION ORAL at 11:40

## 2020-09-11 RX ADMIN — DIVALPROEX SODIUM 1000 MG: 500 TABLET, DELAYED RELEASE ORAL at 21:49

## 2020-09-11 RX ADMIN — LACTULOSE 30 ML: 20 SOLUTION ORAL at 21:50

## 2020-09-11 RX ADMIN — QUETIAPINE FUMARATE 200 MG: 100 TABLET ORAL at 15:45

## 2020-09-11 RX ADMIN — ENOXAPARIN SODIUM 40 MG: 40 INJECTION SUBCUTANEOUS at 15:44

## 2020-09-11 RX ADMIN — ASPIRIN 81 MG: 81 TABLET, COATED ORAL at 11:40

## 2020-09-11 RX ADMIN — DIVALPROEX SODIUM 1000 MG: 500 TABLET, DELAYED RELEASE ORAL at 15:44

## 2020-09-11 RX ADMIN — TOPIRAMATE 100 MG: 100 TABLET, FILM COATED ORAL at 11:40

## 2020-09-11 NOTE — PROGRESS NOTES
Patient attempted to get out of bed and bed alarm alerted nurse to assess the patient. Patient was scooting down in bed to try to stand at foot of bed. Nurse noticed IV line was out and dressed the area with gauze and tape. This nurse and charge nurse tried 4 times to get an IV and in the end were only able to get AM labs. This nurse will pass this along in bedside report and reach out to dayshift resources about IV access. Bedside shift change report given to Ruth Cassidy (oncoming nurse) by Richard Ramirez RN (offgoing nurse). Report included the following information SBAR and Kardex.

## 2020-09-11 NOTE — ROUTINE PROCESS
Bedside shift change report given to [address] Toscano (oncoming nurse) by Thirery Hoff RN (offgoing nurse). Report included the following information SBAR, Kardex, Intake/Output and MAR.

## 2020-09-11 NOTE — PROGRESS NOTES
Problem: Mobility Impaired (Adult and Pediatric)  Goal: Interventions  Description: FUNCTIONAL STATUS PRIOR TO ADMISSION: Patient was independent and active without use of DME.    HOME SUPPORT PRIOR TO ADMISSION: The patient lived with in group home with 1:1 aide. Physical Therapy Goals  Initiated 9/7/2020  1. Patient will move from supine to sit and sit to supine  in bed with minimal assistance/contact guard assist within 7 day(s). 2.  Patient will transfer from bed to chair and chair to bed with minimal assistance/contact guard assist using the least restrictive device within 7 day(s). 3.  Patient will perform sit to stand with minimal assistance/contact guard assist within 7 day(s). 4.  Patient will ambulate with minimal assistance/contact guard assist for 300 feet with the least restrictive device within 7 day(s). 5.  Patient will ascend/descend 12 stairs with 1 handrail(s) with minimal assistance/contact guard assist within 7 day(s). Outcome: Progressing Towards Goal  PHYSICAL THERAPY TREATMENT  Patient: Bernabe Forbes (40 y.o. male)  Date: 9/11/2020  Diagnosis: Lithium toxicity [T56.891A]  Altered mental status [R41.82]   <principal problem not specified>       Precautions: Bed Alarm  Chart, physical therapy assessment, plan of care and goals were reviewed. ASSESSMENT  Patient continues with skilled PT services and is progressing towards goals. Pt agreeable and able to participate in session this date. He transferred supine>sit with Annette, sit<>stand with modA x2, and took side steps along EOB with modA x2. While taking steps, he demonstrated posterior trunk lean and narrow steps but improving compared to last session. Recommending return group home upon discharge.       Current Level of Function Impacting Discharge (mobility/balance): modA x2 for transfers    Other factors to consider for discharge: intelectual disability, has 1:1 caregiver at baseline,          PLAN :  Patient continues to benefit from skilled intervention to address the above impairments. Continue treatment per established plan of care. to address goals. Recommendation for discharge: (in order for the patient to meet his/her long term goals)  Return to group home    This discharge recommendation:  Has not yet been discussed the attending provider and/or case management    IF patient discharges home will need the following DME: none       SUBJECTIVE:   Patient non-verbal at baseline     OBJECTIVE DATA SUMMARY:   Critical Behavior:  Neurologic State: Alert, Confused  Orientation Level: Unable to verbalize  Cognition: Impaired decision making, Impulsive, No command following, Poor safety awareness, Decreased command following, Decreased attention/concentration  Safety/Judgement: Decreased insight into deficits, Decreased awareness of need for safety, Decreased awareness of need for assistance, Decreased awareness of environment  Functional Mobility Training:  Bed Mobility:  Rolling: Contact guard assistance  Supine to Sit: Minimum assistance    Transfers:  Sit to Stand: Moderate assistance;Assist x2  Bed to Chair: Moderate assistance;Assist x2    Balance:  Sitting: Impaired  Sitting - Static: Good (unsupported)  Sitting - Dynamic: Good (unsupported)  Standing: Impaired; With support  Standing - Static: Fair;Constant support  Standing - Dynamic : Fair;Constant support    Ambulation/Gait Training:  Distance (ft): 3 Feet (ft)(side stepping)  Assistive Device: Gait belt(HHA)  Ambulation - Level of Assistance: Moderate assistance;Assist x2  Gait Abnormalities: Decreased step clearance;Shuffling gait  Base of Support: Narrowed; Center of gravity altered(posterior trunk lean)  Stance: Left decreased;Right decreased  Speed/[place]: Slow;Shuffled  Step Length: Left shortened;Right shortened  Swing Pattern: Left asymmetrical;Right asymmetrical    Activity Tolerance:   Fair  Please refer to the flowsheet for vital signs taken during this treatment. After treatment patient left in no apparent distress:   Supine in bed, Call bell within reach, and Bed / chair alarm activated    COMMUNICATION/COLLABORATION:   The patients plan of care was discussed with: Occupational therapist and Registered nurse.      Aida Chatman PT, DPT   Time Calculation: 20 mins

## 2020-09-11 NOTE — PROGRESS NOTES
[facility] Adult  [facility]                                                                                          Hospitalist Progress Note  Babar Saez MD  Answering service: [phone] from in house phone        Date of Service:  2020  NAME:  Theo Sweet  :  1990  MRN:  [id number]      Admission Summary:     Patient presented to the ED from adult senior living with reported altered mental status.  Per report, patient is mostly non-verbal (with exception of incomprehensible speech) at baseline.  Patient says some names and \"bye\" on occasion.  Patient reportedly was not acting like himself and his mentation and behavior was noticeably altered.  Per report, he has been drowsy of the past 2-3 weeks with decreased appetite, ataxia, and tremors.  On arrival to the ED tonight, workup included labs including lithium level = 2.18.  K = 2.6.  Patient was given KCL 40 mEq po and KCL 10 mEq IV run x 1.  Patient notedly usually takes Lithium 50 mg po BID with his last dose given on 2020.  ED requested admission to the hospitalist service at which time, I requested consultation with poison consult.  Recommendations were to recheck lithium level q 4 hours and IV fluid infusion at 250 ml/hr. Sandoval Sandifer were no reports of recent falls, head or neck trauma.         Interval history / Subjective:     Patient is conscious and alert, aphasic but makes some voices     Assessment & Plan:     Lithium toxicity, improved   -lithium was held, on IV fluid per recommendation from poison control  -psychiatry consulted , no additional recommendation  -lithium level WNL, restarted on lithium medications on a lower dose 300 mg at bedtime on   -lithium level was 0.88 on     Tremulousness likely due to debility  -seen by neurologist and recommended PT     Altered mental status, improving   -Secondary to lithium toxicity and electrolyte derangements  -continue neuro check and supportive care     HTN  -BP normal, continue clonidine and monitor BP     History of psychosis  -On multiple psych medications, resume home meds; risperidone, seroquel, lithium, topamax and depakote      Hypokalemia  -replaced and resolved    Hypernatremia  -Na 148, continue D5W and repeat BMP in am    Hx of psychosis   -on seroquel, risperidone, dpakote and topamax    Hx of mental retardation   -continue supportive care     Obesity  -Patient follow-up for weight management     COVID 19 test negative on 9/10      Code status: Full Code  DVT prophylaxis: Lovenox    Care Plan discussed with: Patient/Family, Nurse and   Anticipated Disposition: Group Home  Anticipated Discharge: Greater than 48 hours     I called and updated his guardian, Jaime Atkins at , questions answered     Hospital Problems  Date Reviewed: 1/28/2020          Codes Class Noted POA    Altered mental status ICD-10-CM: R41.82  ICD-9-CM: 780.97  9/4/2020 Unknown        Lithium toxicity ICD-10-CM: P08.658E  ICD-9-CM: 985.8, E980.9  9/4/2020 Unknown              Vital Signs:    Last 24hrs VS reviewed since prior progress note. Most recent are:  Visit Vitals  /72   Pulse 94   Temp 98.4 °F (36.9 °C)   Resp 20   Ht 6' 3\" (1.905 m)   Wt 126.8 kg (279 lb 8.7 oz)   SpO2 98%   BMI 34.94 kg/m²         Intake/Output Summary (Last 24 hours) at 9/11/2020 1452  Last data filed at 9/11/2020 1323  Gross per 24 hour   Intake 600 ml   Output    Net 600 ml        Physical Examination:             Constitutional:  No acute distress, cooperative, pleasant    ENT:  Oral mucosa moist, oropharynx benign. Resp:  CTA bilaterally. No wheezing/rhonchi/rales. No accessory muscle use   CV:  Regular rhythm, normal rate, no murmurs, gallops, rubs    GI:  Soft, non distended, non tender.  normoactive bowel sounds, no hepatosplenomegaly     Musculoskeletal:  No edema     Neurologic: Conscious and alert, aphasic, follows simple command, moves all extremities Skin:  Good turgor, no rashes or ulcers       Data Review:    Review and/or order of clinical lab test  Review and/or order of tests in the radiology section of CPT  Review and/or order of tests in the medicine section of CPT      Labs:     Recent Labs     09/11/20  0530   WBC 4.9   HGB 11.3*   HCT 38.0        Recent Labs     09/11/20  0530      K 4.0      CO2 29   BUN 3*   CREA 0.63*   GLU 64*   CA 8.9     Recent Labs     09/11/20  0530   ALT 16   AP 80   TBILI 0.3   TP 6.3*   ALB 2.5*   GLOB 3.8     No results for input(s): INR, PTP, APTT, INREXT, INREXT in the last 72 hours. No results for input(s): FE, TIBC, PSAT, FERR in the last 72 hours. No results found for: FOL, RBCF   No results for input(s): PH, PCO2, PO2 in the last 72 hours. No results for input(s): CPK, CKNDX, TROIQ in the last 72 hours.     No lab exists for component: CPKMB  Lab Results   Component Value Date/Time    Cholesterol, total 157 11/21/2019 10:30 AM    HDL Cholesterol 44 11/21/2019 10:30 AM    LDL, calculated 97 11/21/2019 10:30 AM    Triglyceride 79 11/21/2019 10:30 AM     No results found for: GLUCPOC  No results found for: COLOR, APPRN, SPGRU, REFSG, KULDEEP, PROTU, GLUCU, KETU, BILU, UROU, ONIEL, LEUKU, GLUKE, EPSU, BACTU, WBCU, RBCU, CASTS, UCRY      Medications Reviewed:     Current Facility-Administered Medications   Medication Dose Route Frequency    dextrose 5% infusion  75 mL/hr IntraVENous CONTINUOUS    lithium carbonate SR (LITHOBID) tablet 300 mg  300 mg Oral QHS    acetaminophen (TYLENOL) tablet 650 mg  650 mg Oral Q6H PRN    Or    acetaminophen (TYLENOL) suppository 650 mg  650 mg Rectal Q6H PRN    cloNIDine HCL (CATAPRES) tablet 0.1 mg  0.1 mg Oral BID    aspirin delayed-release tablet 81 mg  81 mg Oral DAILY    topiramate (TOPAMAX) tablet 100 mg  100 mg Oral BID    LORazepam (ATIVAN) tablet 1 mg  1 mg Oral BID PRN    QUEtiapine (SEROquel) tablet 200 mg  200 mg Oral TID    lactulose (CHRONULAC) 10 gram/15 mL solution 45 mL  45 mL Oral DAILY    divalproex DR (DEPAKOTE) tablet 1,000 mg  1,000 mg Oral TID    risperiDONE (RisperDAL) tablet 4 mg  4 mg Oral QHS    risperiDONE (RisperDAL) tablet 2 mg  2 mg Oral DAILY    diphenhydrAMINE (BENADRYL) capsule 50 mg  50 mg Oral QHS    lactulose (CHRONULAC) 10 gram/15 mL solution 30 mL  30 mL Oral QHS    melatonin tablet 4.5 mg  4.5 mg Oral QHS    enoxaparin (LOVENOX) injection 40 mg  40 mg SubCUTAneous Q24H     ______________________________________________________________________  EXPECTED LENGTH OF STAY: 3d 12h  ACTUAL LENGTH OF STAY:          7                 Vargas Day MD

## 2020-09-11 NOTE — PROGRESS NOTES
Comprehensive Nutrition Assessment    Type and Reason for Visit:      Nutrition Recommendations/Plan:    1. Continue current diet  2. Encourage oral fluid intake with hypernatremia  3. Continue weekly weights, and if able weigh on standing scale or zero bedscale. Nutrition Assessment:    Pt admitted from group home for lithium toxicity. Past Medical History:   Diagnosis Date    Hyperlipidemia     Mental retardation     Obesity (BMI 30-39. 9)     Type II or unspecified type diabetes mellitus without mention of complication, not stated as uncontrolled      Mostly non-verbal at baseline. Lithium dosing adjusted with levels being monitored. Neurology following for tremors. Hypernatremia yesterday, D5 to start with IV access obtained this morning per RN reports. Appetite has been fair/good with regular diet per documentation. Will add Ensure 1x/day (350kcal, 20g protein) based on estimated needs. Pt visited but completely covered with blanket and did not wake to name. Wt has been relatively stable per records. Bedscale used for current wt. Continue weekly weights, and if able weigh on standing scale or zero bedscale.      Malnutrition Assessment:  Malnutrition Status:  No malnutrition      Nutritionally Significant Medications: lactulose, lithium, seroquel, D5 @ 75ml/hr    Estimated Daily Nutrient Needs:  Energy (kcal):  0520-6616(MSJ x 1.1-1.2)  Protein (g):  100-126g (0.8-1g/kg)       Fluid (ml/day):  2500 - ~ 1ml/kcal    Nutrition Related Findings: 9/10 BM, no edema  Wounds:  None       Current Nutrition Therapies:   Diet: regular  Supplements: none  Meal intake:   Patient Vitals for the past 168 hrs:   % Diet Eaten   09/09/20 1234 80 %   09/09/20 0835 80 %   09/08/20 0844 60 %   09/07/20 0751 75 %   09/06/20 1723 75 %   09/06/20 1346 50 %   09/06/20 0746 100 %   09/04/20 1740 15 %     Anthropometric Measures:  · Height:  6' 3\" (190.5 cm)  · Current Body Wt:  126.8 kg (279 lb 8.7 oz)   · Admission Body Wt: 266 lb 12.1 oz    · Usual Body Wt:  264 lb 8.8 oz     · Ideal Body Wt:   :  142.6 %   Wt Readings from Last 10 Encounters:   09/07/20 126.8 kg (279 lb 8.7 oz)   01/28/20 132 kg (291 lb)   11/21/19 122 kg (269 lb)   05/24/19 117.9 kg (260 lb)   05/15/18 117.9 kg (260 lb)   11/14/17 121.7 kg (268 lb 3.2 oz)   02/22/17 129.7 kg (286 lb)   10/07/16 127.9 kg (282 lb)   06/10/16 130 kg (286 lb 9.6 oz)   12/11/15 132.7 kg (292 lb 9.6 oz)     Nutrition Diagnosis:   · Altered nutrition-related lab values related to (?lithium toxicity) as evidenced by lab values(hypernatremia)    Nutrition Interventions:   Food and/or Nutrient Delivery: Continue current diet, Start oral nutrition supplement  Nutrition Education and Counseling: No recommendations at this time  Coordination of Nutrition Care: Continued inpatient monitoring    Goals:  Continued consumption of at least 76% meals       Nutrition Monitoring and Evaluation:   Behavioral-Environmental Outcomes:    Food/Nutrient Intake Outcomes: Food and nutrient intake, Supplement intake  Physical Signs/Symptoms Outcomes: Weight, Biochemical data    Discharge Planning:    Continue current diet     Maday Lawrence, RD  [address] , Pager #[phone] or via Let

## 2020-09-11 NOTE — PROGRESS NOTES
Problem: Self Care Deficits Care Plan (Adult)  Goal: *Acute Goals and Plan of Care (Insert Text)  Description:   FUNCTIONAL STATUS PRIOR TO ADMISSION: patient non-verbal. Performing all basic ADLs with increased time, visual cues and familiarity of routine. Aid and home performs all instrumental ADLs. Patient enjoys leaving the house to feed ducks, Putt putt, etc.  Motivated by food and drinks in which aid has to use sparingly due to obesity (patient gestures at mouth for food). Does well with simple humor. Requires external cues to motivate patient, otherwise learned dependency. Recently increased weakness, falling getting in and out of tub. HOME SUPPORT: The patient lived with group home residents and care aid 1/1 5 hours each day M-Th. Occupational Therapy Goals  Initiated 9/7/2020  1. Patient will perform opening and closing ADL items 2/2 with minimal assistance/contact guard assist within 7 day(s). 2.  Patient will perform self feeding with setup only within 7 day(s). 3.  Patient will perform bathing with moderate A within 7 day(s). 4.  Patient will perform BSC toilet transfers with moderate assistance  within 7 day(s). 5.  Patient will perform all aspects of toileting with moderate assistance  within 7 day(s). Outcome: Progressing Towards Goal   OCCUPATIONAL THERAPY TREATMENT  Patient: Khurram Hinkle (24 y.o. male)  Date: 9/11/2020  Diagnosis: Lithium toxicity [T56.891A]  Altered mental status [R41.82]   <principal problem not specified>       Precautions: Bed Alarm  Chart, occupational therapy assessment, plan of care, and goals were reviewed. ASSESSMENT  Patient continues with skilled OT services and is progressing towards goals. Pt received supine in bed with depends needing to be changed. Pt able to assist rolling side to side in bed at min assist x 1 with verbal and tactile cues. Supine to sit to EOB at min to mod assist x1.   Pt stood 2x at mod assist x 2 and demonstrated ability to side step higher in bed. Overall, pt's mobility improving and anticipate pt will be able to return to group home. Will con't to follow and address listed goals. Current Level of Function Impacting Discharge (ADLs): mod assist x 2 with sit to stand    Other factors to consider for discharge:          PLAN :  Patient continues to benefit from skilled intervention to address the above impairments. Continue treatment per established plan of care. to address goals. Recommend with staff:     Recommend next OT session: bathing and grooming seated on EOB    Recommendation for discharge: (in order for the patient to meet his/her long term goals)  To be determined: back to group home    This discharge recommendation:  A follow-up discussion with the attending provider and/or case management is planned    IF patient discharges home will need the following DME: TBD       SUBJECTIVE:   Patient non-verbal    OBJECTIVE DATA SUMMARY:   Cognitive/Behavioral Status:  Neurologic State: Alert  Orientation Level: Unable to verbalize  Cognition: Follows commands             Functional Mobility and Transfers for ADLs:  Bed Mobility:  Rolling: Contact guard assistance  Supine to Sit: Minimum assistance  Scooting: Stand-by assistance    Transfers:  Sit to Stand: Moderate assistance;Assist x2     Bed to Chair: Moderate assistance;Assist x2    Balance:  Sitting: Impaired  Sitting - Static: Good (unsupported)  Sitting - Dynamic: Good (unsupported)  Standing: Impaired; With support  Standing - Static: Fair;Constant support  Standing - Dynamic : Fair;Constant support    ADL Intervention:            Lower Body Bathing  Perineal  : Total assistance (dependent)  Position Performed: Other(sidelying)    Upper Body [address]: Minimum  assistance; Moderate assistance    Lower Body Dressing Assistance  Socks:  Total assistance (dependent)    Toileting  Bladder Hygiene: Maximum assistance           Pain:  No c/o pain    Activity Tolerance:   Good  Please refer to the flowsheet for vital signs taken during this treatment. After treatment patient left in no apparent distress:   Supine in bed, Call bell within reach, and Bed / chair alarm activated    COMMUNICATION/COLLABORATION:   The patients plan of care was discussed with: Physical therapist and Registered nurse.      Monika Osullivan OTR/L  Time Calculation: 19 mins

## 2020-09-11 NOTE — PROGRESS NOTES
Unable to draw lab work ordered on pt, PICC team re sited IV but had to use vein finder/u/s machine to get line. Both new lines are difficult to use, alarms continuously and causes more agitation to pt. Spoke with MD earlier and obtained order for arterial stick to obtain lithium level. 1900 attempting to call resp therapist to obtain blood work but line is continuously busy. 2100  Bedside and Verbal shift change report given to [address] [place] [address]  (oncoming nurse) by Christian Nolen (offgoing nurse). Report included the following information SBAR and Kardex.

## 2020-09-11 NOTE — PROGRESS NOTES
VENECIA- Return to [address] with [address] CM noted that per OT note, pt could benefit from home health OT as well as PT. CM obtained the order for  OT. Lawrence Ayon

## 2020-09-12 LAB
ALBUMIN SERPL-MCNC: 2 G/DL (ref 3.5–5)
ALBUMIN/GLOB SERPL: 0.6 {RATIO} (ref 1.1–2.2)
ALP SERPL-CCNC: 62 U/L (ref 45–117)
ALT SERPL-CCNC: 14 U/L (ref 12–78)
ANION GAP SERPL CALC-SCNC: 3 MMOL/L (ref 5–15)
AST SERPL-CCNC: 21 U/L (ref 15–37)
BILIRUB SERPL-MCNC: 0.3 MG/DL (ref 0.2–1)
BUN SERPL-MCNC: 4 MG/DL (ref 6–20)
BUN/CREAT SERPL: 6 (ref 12–20)
CALCIUM SERPL-MCNC: 8.6 MG/DL (ref 8.5–10.1)
CHLORIDE SERPL-SCNC: 110 MMOL/L (ref 97–108)
CO2 SERPL-SCNC: 32 MMOL/L (ref 21–32)
COMMENT, HOLDF: NORMAL
CREAT SERPL-MCNC: 0.63 MG/DL (ref 0.7–1.3)
DATE LAST DOSE: ABNORMAL
GLOBULIN SER CALC-MCNC: 3.3 G/DL (ref 2–4)
GLUCOSE SERPL-MCNC: 69 MG/DL (ref 65–100)
LITHIUM SERPL-SCNC: 0.51 MMOL/L (ref 0.6–1.2)
POTASSIUM SERPL-SCNC: 3.9 MMOL/L (ref 3.5–5.1)
PROT SERPL-MCNC: 5.3 G/DL (ref 6.4–8.2)
REPORTED DOSE,DOSE: ABNORMAL UNITS
REPORTED DOSE/TIME,TMG: 2000
SAMPLES BEING HELD,HOLD: NORMAL
SODIUM SERPL-SCNC: 145 MMOL/L (ref 136–145)

## 2020-09-12 PROCEDURE — 74011250637 HC RX REV CODE- 250/637: Performed by: INTERNAL MEDICINE

## 2020-09-12 PROCEDURE — 80053 COMPREHEN METABOLIC PANEL: CPT

## 2020-09-12 PROCEDURE — 80178 ASSAY OF LITHIUM: CPT

## 2020-09-12 PROCEDURE — 74011250636 HC RX REV CODE- 250/636: Performed by: FAMILY MEDICINE

## 2020-09-12 PROCEDURE — 65270000029 HC RM PRIVATE

## 2020-09-12 PROCEDURE — 36415 COLL VENOUS BLD VENIPUNCTURE: CPT

## 2020-09-12 PROCEDURE — 74011250637 HC RX REV CODE- 250/637: Performed by: FAMILY MEDICINE

## 2020-09-12 PROCEDURE — 36600 WITHDRAWAL OF ARTERIAL BLOOD: CPT

## 2020-09-12 RX ADMIN — LACTULOSE 30 ML: 20 SOLUTION ORAL at 22:52

## 2020-09-12 RX ADMIN — RISPERIDONE 4 MG: 3 TABLET ORAL at 22:34

## 2020-09-12 RX ADMIN — QUETIAPINE FUMARATE 200 MG: 100 TABLET ORAL at 16:13

## 2020-09-12 RX ADMIN — MELATONIN 4.5 MG: at 22:35

## 2020-09-12 RX ADMIN — LITHIUM CARBONATE 300 MG: 300 TABLET, EXTENDED RELEASE ORAL at 22:34

## 2020-09-12 RX ADMIN — CLONIDINE HYDROCHLORIDE 0.1 MG: 0.1 TABLET ORAL at 18:48

## 2020-09-12 RX ADMIN — LORAZEPAM 1 MG: 0.5 TABLET ORAL at 16:13

## 2020-09-12 RX ADMIN — RISPERIDONE 2 MG: 1 TABLET ORAL at 09:44

## 2020-09-12 RX ADMIN — ASPIRIN 81 MG: 81 TABLET, COATED ORAL at 09:44

## 2020-09-12 RX ADMIN — LORAZEPAM 1 MG: 0.5 TABLET ORAL at 23:07

## 2020-09-12 RX ADMIN — DIPHENHYDRAMINE HYDROCHLORIDE 50 MG: 25 CAPSULE ORAL at 22:34

## 2020-09-12 RX ADMIN — LACTULOSE 45 ML: 20 SOLUTION ORAL at 09:43

## 2020-09-12 RX ADMIN — DIVALPROEX SODIUM 1000 MG: 500 TABLET, DELAYED RELEASE ORAL at 22:34

## 2020-09-12 RX ADMIN — QUETIAPINE FUMARATE 200 MG: 100 TABLET ORAL at 09:44

## 2020-09-12 RX ADMIN — ENOXAPARIN SODIUM 40 MG: 40 INJECTION SUBCUTANEOUS at 16:12

## 2020-09-12 RX ADMIN — TOPIRAMATE 100 MG: 100 TABLET, FILM COATED ORAL at 18:48

## 2020-09-12 RX ADMIN — TOPIRAMATE 100 MG: 100 TABLET, FILM COATED ORAL at 09:44

## 2020-09-12 RX ADMIN — DIVALPROEX SODIUM 1000 MG: 500 TABLET, DELAYED RELEASE ORAL at 16:13

## 2020-09-12 RX ADMIN — DIVALPROEX SODIUM 1000 MG: 500 TABLET, DELAYED RELEASE ORAL at 09:44

## 2020-09-12 RX ADMIN — QUETIAPINE FUMARATE 200 MG: 100 TABLET ORAL at 22:34

## 2020-09-12 NOTE — PROGRESS NOTES
Pt  has been screaming out more today, seems to want someone in room with him. Pt has pulled out two IV's today. Dr David Logan made aware. PICC team had to resite pt yesterday with much difficulty. Pt currently does not have iv fluids infusing.

## 2020-09-12 NOTE — PROGRESS NOTES
Pt currently has condom cath in place, pt will periodically pull off, replaced this morning by Clem PCT.

## 2020-09-12 NOTE — PROGRESS NOTES
I have reviewed and agree with the charting of student nurse, [address] [place] [address],[address]. Bedside shift change report given to Pili Copeland RN (oncoming nurse) by Minh Rosado RN (offgoing nurse). Report included the following information SBAR, Kardex, Intake/Output, MAR and Recent Results.

## 2020-09-12 NOTE — PROGRESS NOTES
[facility] Adult  Hospitalist Group                                                                                          Hospitalist Progress Note  Dirk Nguyen MD  Answering service: [phone] from in house phone        Date of Service:  2020  NAME:  Leigh Leblanc  :  1990  MRN:  [id number]      Admission Summary:     Patient presented to the ED from adult [facility] with reported altered mental status.  Per report, patient is mostly non-verbal (with exception of incomprehensible speech) at baseline.  Patient says some names and \"bye\" on occasion. Patient reportedly was not acting like himself and his mentation and behavior was noticeably altered.  Per report, he has been drowsy of the past 2-3 weeks with decreased appetite, ataxia, and tremors.  On arrival to the ED tonight, workup included labs including lithium level = 2.18.  K = 2.6.  Patient was given KCL 40 mEq po and KCL 10 mEq IV run x 1.  Patient notedly usually takes Lithium 50 mg po BID with his last dose given on 2020.  ED requested admission to the hospitalist service at which time, I requested consultation with poison consult.  Recommendations were to recheck lithium level q 4 hours and IV fluid infusion at 250 ml/hr. [facility] were no reports of recent falls, head or neck trauma.      Interval history / Subjective:     Patient is conscious and alert, aphasic but makes some voices     Assessment & Plan:     Lithium toxicity, improved   -lithium was held initially, on IV fluid per recommendation from poison control  -psychiatry consulted , no additional recommendation  -lithium level WNL, restarted on lithium medications on a lower dose 300 mg at bedtime on   -lithium level was 0.51 on     Tremulousness likely due to debility  -seen by neurologist and recommended PT     Altered mental status   -Secondary to lithium toxicity and electrolyte derangements  -improving, conscious and alert, appears at base line  -continue neuro check and supportive care     HTN  -BP normal, continue clonidine and monitor BP     History of psychosis  -On multiple psych medications, resume home meds; risperidone, seroquel, lithium, topamax and depakote      Hypokalemia  -replaced and resolved    Hypernatremia  -improved, Na 145, cut back D5W and repeat BMP in am    Hx of psychosis   -on seroquel, risperidone, dpakote and topamax    Hx of mental retardation   -continue supportive care     Obesity  -Patient follow-up for weight management     COVID 19 test negative on 9/10      Code status: Full Code  DVT prophylaxis: Lovenox    Care Plan discussed with: Patient/Family, Nurse and   Anticipated Disposition: Group Home  Anticipated Discharge: Greater than 48 hours     I called and updated his guardian, Savita Nicolas at , questions answered on 9/12     Hospital Problems  Date Reviewed: 1/28/2020          Codes Class Noted POA    Altered mental status ICD-10-CM: R41.82  ICD-9-CM: 780.97  9/4/2020 Unknown        Lithium toxicity ICD-10-CM: S89.121Z  ICD-9-CM: 985.8, E980.9  9/4/2020 Unknown              Vital Signs:    Last 24hrs VS reviewed since prior progress note. Most recent are:  Visit Vitals  /66   Pulse 78   Temp 97.6 °F (36.4 °C)   Resp 18   Ht 6' 3\" (1.905 m)   Wt 126.8 kg (279 lb 8.7 oz)   SpO2 100%   BMI 34.94 kg/m²         Intake/Output Summary (Last 24 hours) at 9/12/2020 1220  Last data filed at 9/12/2020 1100  Gross per 24 hour   Intake 1080 ml   Output 4100 ml   Net -3020 ml        Physical Examination:             Constitutional:  No acute distress, cooperative, pleasant    ENT:  Oral mucosa moist, oropharynx benign. Resp:  CTA bilaterally. No wheezing/rhonchi/rales. No accessory muscle use   CV:  Regular rhythm, normal rate, no murmurs, gallops, rubs    GI:  Soft, non distended, non tender.  normoactive bowel sounds, no hepatosplenomegaly     Musculoskeletal:  No edema     Neurologic: Conscious and alert, aphasic, follows simple command, moves all extremities     Skin:  Good turgor, no rashes or ulcers       Data Review:    Review and/or order of clinical lab test  Review and/or order of tests in the radiology section of CPT  Review and/or order of tests in the medicine section of CPT      Labs:     Recent Labs     09/11/20  0530   WBC 4.9   HGB 11.3*   HCT 38.0        Recent Labs     09/12/20  0450 09/11/20  0530    139   K 3.9 4.0   * 106   CO2 32 29   BUN 4* 3*   CREA 0.63* 0.63*   GLU 69 64*   CA 8.6 8.9     Recent Labs     09/12/20  0450 09/11/20  0530   ALT 14 16   AP 62 80   TBILI 0.3 0.3   TP 5.3* 6.3*   ALB 2.0* 2.5*   GLOB 3.3 3.8     No results for input(s): INR, PTP, APTT, INREXT, INREXT in the last 72 hours. No results for input(s): FE, TIBC, PSAT, FERR in the last 72 hours. No results found for: FOL, RBCF   No results for input(s): PH, PCO2, PO2 in the last 72 hours. No results for input(s): CPK, CKNDX, TROIQ in the last 72 hours.     No lab exists for component: CPKMB  Lab Results   Component Value Date/Time    Cholesterol, total 157 11/21/2019 10:30 AM    HDL Cholesterol 44 11/21/2019 10:30 AM    LDL, calculated 97 11/21/2019 10:30 AM    Triglyceride 79 11/21/2019 10:30 AM     No results found for: GLUCPOC  No results found for: COLOR, APPRN, SPGRU, REFSG, KULDEEP, PROTU, GLUCU, KETU, BILU, UROU, ONIEL, LEUKU, GLUKE, EPSU, BACTU, WBCU, RBCU, CASTS, UCRY      Medications Reviewed:     Current Facility-Administered Medications   Medication Dose Route Frequency    dextrose 5% infusion  75 mL/hr IntraVENous CONTINUOUS    lithium carbonate SR (LITHOBID) tablet 300 mg  300 mg Oral QHS    acetaminophen (TYLENOL) tablet 650 mg  650 mg Oral Q6H PRN    Or    acetaminophen (TYLENOL) suppository 650 mg  650 mg Rectal Q6H PRN    cloNIDine HCL (CATAPRES) tablet 0.1 mg  0.1 mg Oral BID    aspirin delayed-release tablet 81 mg  81 mg Oral DAILY    topiramate (TOPAMAX) tablet 100 mg  100 mg Oral BID    LORazepam (ATIVAN) tablet 1 mg  1 mg Oral BID PRN    QUEtiapine (SEROquel) tablet 200 mg  200 mg Oral TID    lactulose (CHRONULAC) 10 gram/15 mL solution 45 mL  45 mL Oral DAILY    divalproex DR (DEPAKOTE) tablet 1,000 mg  1,000 mg Oral TID    risperiDONE (RisperDAL) tablet 4 mg  4 mg Oral QHS    risperiDONE (RisperDAL) tablet 2 mg  2 mg Oral DAILY    diphenhydrAMINE (BENADRYL) capsule 50 mg  50 mg Oral QHS    lactulose (CHRONULAC) 10 gram/15 mL solution 30 mL  30 mL Oral QHS    melatonin tablet 4.5 mg  4.5 mg Oral QHS    enoxaparin (LOVENOX) injection 40 mg  40 mg SubCUTAneous Q24H     ______________________________________________________________________  EXPECTED LENGTH OF STAY: 3d 12h  ACTUAL LENGTH OF STAY:          8                 Zabrina Munguia MD

## 2020-09-13 ENCOUNTER — HOME CARE VISIT (OUTPATIENT)
Dept: HOME HEALTH SERVICES | Facility: HOME HEALTH | Age: 30
End: 2020-09-13

## 2020-09-13 VITALS
WEIGHT: 279.54 LBS | OXYGEN SATURATION: 96 % | BODY MASS INDEX: 34.76 KG/M2 | HEART RATE: 99 BPM | RESPIRATION RATE: 15 BRPM | SYSTOLIC BLOOD PRESSURE: 131 MMHG | DIASTOLIC BLOOD PRESSURE: 78 MMHG | HEIGHT: 75 IN | TEMPERATURE: 98.2 F

## 2020-09-13 PROCEDURE — 74011250637 HC RX REV CODE- 250/637: Performed by: FAMILY MEDICINE

## 2020-09-13 RX ADMIN — LACTULOSE 45 ML: 20 SOLUTION ORAL at 10:10

## 2020-09-13 RX ADMIN — QUETIAPINE FUMARATE 200 MG: 100 TABLET ORAL at 10:09

## 2020-09-13 RX ADMIN — ASPIRIN 81 MG: 81 TABLET, COATED ORAL at 10:10

## 2020-09-13 RX ADMIN — CLONIDINE HYDROCHLORIDE 0.1 MG: 0.1 TABLET ORAL at 10:10

## 2020-09-13 RX ADMIN — TOPIRAMATE 100 MG: 100 TABLET, FILM COATED ORAL at 10:09

## 2020-09-13 RX ADMIN — DIVALPROEX SODIUM 1000 MG: 500 TABLET, DELAYED RELEASE ORAL at 10:09

## 2020-09-13 RX ADMIN — RISPERIDONE 2 MG: 1 TABLET ORAL at 10:10

## 2020-09-13 NOTE — DISCHARGE INSTRUCTIONS
Discharge SNF/Rehab Instructions/LTAC       PATIENT ID: Rose Parish  MRN: [id number]   YOB: 1990    DATE OF ADMISSION: 9/3/2020  7:39 PM    DATE OF DISCHARGE: 9/13/2020    PRIMARY CARE PROVIDER: Thu Romero NP       ATTENDING PHYSICIAN: Ravi Hernandez MD  DISCHARGING PROVIDER: Flash Purcell MD     To contact this individual call [phone] and ask the  to page. If unavailable ask to be transferred the Adult Hospitalist Department. CONSULTATIONS: IP CONSULT TO PSYCHIATRY  IP CONSULT TO NEUROLOGY    PROCEDURES/SURGERIES: * No surgery found *    ADMITTING DIAGNOSES & HOSPITAL COURSE:     Patient presented to the ED from adult half-way with reported altered mental status.  Per report, patient is mostly non-verbal (with exception of incomprehensible speech) at baseline.  Patient says some names and \"bye\" on occasion. Patient reportedly was not acting like himself and his mentation and behavior was noticeably altered.  Per report, he has been drowsy of the past 2-3 weeks with decreased appetite, ataxia, and tremors.  On arrival to the ED tonight, workup included labs including lithium level = 2.18.  K = 2.6.  Patient was given KCL 40 mEq po and KCL 10 mEq IV run x 1.  Patient notedly usually takes Lithium 50 mg po BID with his last dose given on 9/2/2020.  ED requested admission to the hospitalist service at which time, I requested consultation with poison consult.  Recommendations were to recheck lithium level q 4 hours and IV fluid infusion at 250 ml/hr. Javad Motts were no reports of recent falls, head or neck trauma.    Lithium toxicity, improved   -lithium was held initially,has receive normal saline per recommendation from poison control  -psychiatry consulted , no additional recommendation  -lithium level WNL, restarted on lithium medications on a lower dose 300 mg at bedtime on 9/9  -lithium level was 0.51 on 9/12  Tremulousness likely due to debility  -seen by neurologist and recommended PT  Altered mental status   -Secondary to lithium toxicity and electrolyte derangements  -improved, conscious and alert, appears at base line  -continue neuro check and supportive care   HTN  -BP normal, continue clonidine, home HCTZ is held and monitor BP  History of psychosis  -On multiple psych medications, resume home meds; risperidone, seroquel, lithium, topamax and depakote   Hypokalemia  -replaced and resolved  Hypernatremia  -improved, Na 145, cut back D5W and repeat BMP in am  Hx of mental retardation   -continue supportive care  Obesity  -Patient follow-up for weight management     COVID 19 test negative on 9/10        Code status: Full Code  DVT prophylaxis: Lovenox    DISCHARGE DIAGNOSES / PLAN:      Lithium toxicity  -resolved  -repeated lithium level, 0.51 on 9/12  Tremulousness likely due to debility  -continue PT  Altered mental status secondary to lithium toxicity and electrolyte derangements  -resolved, at base line  HTN  -continue clonidine, home HCTZ is held and monitor BP  History of psychosis  -continue risperidone, seroquel, lithium 300 mg, topamax and depakote   Hypokalemia  - resolved  Hypernatremia  -resolved  Hx of mental retardation   -continue supportive care  Obesity  -Patient follow-up for weight management    PENDING TEST RESULTS:   At the time of discharge the following test results are still pending: None    FOLLOW UP APPOINTMENTS:    Follow-up Information     Follow up With Specialties Details Why Contact Info   1. [id number]/ Please call this agency when you get home. [address]  [facility] [address]   2. Tomy Borges NP Nurse Practitioner In one week  [address] [phone]       3.  Follow up with Psychiatrist in one week    ADDITIONAL CARE RECOMMENDATIONS:      DIET: Regular Diet with nutrition supplement    TUBE FEEDING INSTRUCTIONS: None    OXYGEN / BiPAP SETTINGS: None    ACTIVITY: Activity as tolerated    WOUND CARE: None    EQUIPMENT needed: None      DISCHARGE MEDICATIONS:   See Medication Reconciliation Form      NOTIFY YOUR PHYSICIAN FOR ANY OF THE FOLLOWING:   Fever over 101 degrees for 24 hours. Chest pain, shortness of breath, fever, chills, nausea, vomiting, diarrhea, change in mentation, falling, weakness, bleeding. Severe pain or pain not relieved by medications. Or, any other signs or symptoms that you may have questions about. DISPOSITION:  x  Home With:   OT x PT x HH  RN       SNF/Inpatient Rehab/LTAC    Independent/assisted living    Hospice    Other:       PATIENT CONDITION AT DISCHARGE:     Functional status    Poor     Deconditioned    x Independent      Cognition   x  Mental Retardation     Forgetful     Dementia      Catheters/lines (plus indication)    Ravi     PICC     PEG    x None      Code status   x  Full code     DNR      PHYSICAL EXAMINATION AT DISCHARGE:   Refer to Progress Note       CHRONIC MEDICAL DIAGNOSES:  Problem List as of 9/13/2020 Date Reviewed: 1/28/2020          Codes Class Noted - Resolved    Altered mental status ICD-10-CM: R41.82  ICD-9-CM: 780.97  9/4/2020 - Present        Lithium toxicity ICD-10-CM: J20.013U  ICD-9-CM: 985.8, E980.9  9/4/2020 - Present        Severe obesity (BMI 35.0-39. 9) with comorbidity ([address] ICD-10-CM: E66.01  ICD-9-CM: 278.01  5/15/2018 - Present        IGT (impaired glucose tolerance) ICD-10-CM: R73.02  ICD-9-CM: 790.22  6/10/2016 - Present        Obesity (BMI 30-39. 9) ICD-10-CM: E66.9  ICD-9-CM: 278.00  6/10/2016 - Present        Intellectual disability ICD-10-CM: F79  ICD-9-CM: 464  6/10/2016 - Present        Hyperlipidemia ICD-10-CM: E78.5  ICD-9-CM: 272.4  10/16/2012 - Present        Psychiatric diagnosis ICD-10-CM: F99  ICD-9-CM: 300.9  10/16/2012 - Present                CDMP Checked:   Yes x     PROBLEM LIST Updated:  Yes x         Signed:   Eugene Go Josh Watkins MD  9/13/2020  7:45 AM

## 2020-09-13 NOTE — DISCHARGE SUMMARY
Discharge Summary       PATIENT ID: Figueroa Dsouza  MRN: [id number]   YOB: 1990    DATE OF ADMISSION: 9/3/2020  7:39 PM    DATE OF DISCHARGE: 9/13/2020   PRIMARY CARE PROVIDER: Nash Perrin NP     ATTENDING PHYSICIAN: Catracho Lyon MD  DISCHARGING PROVIDER: Eddie Linton MD    To contact this individual call [phone] and ask the  to page. If unavailable ask to be transferred the Adult Hospitalist Department. CONSULTATIONS: IP CONSULT TO PSYCHIATRY  IP CONSULT TO NEUROLOGY    PROCEDURES/SURGERIES: * No surgery found *    ADMITTING DIAGNOSES & HOSPITAL COURSE:     Patient presented to the ED from adult [facility] with reported altered mental status.  Per report, patient is mostly non-verbal (with exception of incomprehensible speech) at baseline.  Patient says some names and \"bye\" on occasion. Patient reportedly was not acting like himself and his mentation and behavior was noticeably altered.  Per report, he has been drowsy of the past 2-3 weeks with decreased appetite, ataxia, and tremors.  On arrival to the ED tonight, workup included labs including lithium level = 2.18.  K = 2.6.  Patient was given KCL 40 mEq po and KCL 10 mEq IV run x 1.  Patient notedly usually takes Lithium 50 mg po BID with his last dose given on 9/2/2020.  ED requested admission to the hospitalist service at which time, I requested consultation with poison consult.  Recommendations were to recheck lithium level q 4 hours and IV fluid infusion at 250 ml/hr. Helen Danielle were no reports of recent falls, head or neck trauma.    Lithium toxicity, improved   -lithium was held initially,has receive normal saline per recommendation from poison control  -psychiatry consulted , no additional recommendation  -lithium level WNL, restarted on lithium medications on a lower dose 300 mg at bedtime on 9/9  -lithium level was 0.51 on 9/12  Tremulousness likely due to debility  -seen by neurologist and recommended PT  Altered mental status   -Secondary to lithium toxicity and electrolyte derangements  -improved, conscious and alert, appears at base line  -continue neuro check and supportive care   HTN  -BP normal, continue clonidine, home HCTZ is held and monitor BP  History of psychosis  -On multiple psych medications, resume home meds; risperidone, seroquel, lithium, topamax and depakote   Hypokalemia  -replaced and resolved  Hypernatremia  -improved, Na 145, cut back D5W and repeat BMP in am  Hx of mental retardation   -continue supportive care  Obesity  -Patient follow-up for weight management     COVID 19 test negative on 9/10        Code status: Full Code  DVT prophylaxis: Lovenox    DISCHARGE DIAGNOSES / PLAN:      Lithium toxicity  -resolved  -repeated lithium level, 0.51 on 9/12  Tremulousness likely due to debility  -continue PT  Altered mental status secondary to lithium toxicity and electrolyte derangements  -resolved, at base line  HTN  -continue clonidine, home HCTZ is held and monitor BP  History of psychosis  -continue risperidone, seroquel, lithium 300 mg, topamax and depakote   Hypokalemia  - resolved  Hypernatremia  -resolved  Hx of mental retardation   -continue supportive care  Obesity  -Patient follow-up for weight management    PENDING TEST RESULTS:   At the time of discharge the following test results are still pending: None    FOLLOW UP APPOINTMENTS:    Follow-up Information     Follow up With Specialties Details Why Contact [address]/ Please call this agency when you get home.  [phone] Jenelle Espinoza, NP Nurse Practitioner In one week  [address] [phone]         DIET: Regular Diet    ACTIVITY: Activity as tolerated    WOUND CARE: None    EQUIPMENT needed: None      DISCHARGE MEDICATIONS:  Current Discharge Medication List      CONTINUE these medications which have NOT CHANGED    Details   divalproex DR (Depakote) 500 mg tablet Take 1,000 mg by mouth three (3) times daily. melatonin 5 mg tablet Take 5 mg by mouth nightly. !! risperiDONE (RisperDAL) 4 mg tablet Take 4 mg by mouth nightly. lithium carbonate SR (LITHOBID) 300 mg CR tablet Take 300 mg by mouth nightly. Takes with 450 mg tab for total of 750 mg      !! lactulose (CHRONULAC) 10 gram/15 mL solution Take 30 mL by mouth nightly. potassium chloride SR (KLOR-CON 10) 10 mEq tablet TAKE 1 TABLET BY MOUTH DAILY  Qty: 30 Tab, Refills: PRN      !! lactulose (CHRONULAC) 10 gram/15 mL solution Take 45 mL by mouth daily. QUEtiapine (SEROQUEL) 200 mg tablet Take 200 mg by mouth three (3) times daily. diphenhydrAMINE (BANOPHEN) 50 mg capsule Take 50 mg by mouth nightly. aspirin delayed-release 81 mg tablet TAKE 1 TABLET DAILY. Qty: 30 Tab, Refills: 0      cloNIDine HCl (CATAPRES) 0.1 mg tablet TAKE 1 TABLET BY MOUTH TWICE A DAY  Qty: 60 Tab, Refills: 0      !! risperiDONE (RisperDAL) 2 mg tablet Take 2 mg by mouth daily. LORazepam (ATIVAN) 1 mg tablet Take 1 mg by mouth two (2) times daily as needed. topiramate (TOPAMAX) 100 mg tablet Take 100 mg by mouth two (2) times a day. traZODone (DESYREL) 100 mg tablet Take 100 mg by mouth nightly. !! - Potential duplicate medications found. Please discuss with provider. STOP taking these medications       hydroCHLOROthiazide (HYDRODIURIL) 12.5 mg tablet Comments:   Reason for Stopping:                 NOTIFY YOUR PHYSICIAN FOR ANY OF THE FOLLOWING:   Fever over 101 degrees for 24 hours. Chest pain, shortness of breath, fever, chills, nausea, vomiting, diarrhea, change in mentation, falling, weakness, bleeding. Severe pain or pain not relieved by medications. Or, any other signs or symptoms that you may have questions about.     DISPOSITION:    Home With:   OT  PT  HH  RN      x Long term SNF/Inpatient Rehab Independent/assisted living    Hospice    Other:       PATIENT CONDITION AT DISCHARGE:     Functional status    Poor    x Deconditioned     Independent      Cognition   x  Mental Retardation     Forgetful     Dementia      Catheters/lines (plus indication)    Ravi     PICC     PEG    x None      Code status    x Full code     DNR      PHYSICAL EXAMINATION AT DISCHARGE:  Patient Vitals for the past 24 hrs:   Temp Pulse Resp BP SpO2   09/13/20 1006  99  131/78    09/13/20 1000 98.2 °F (36.8 °C) 83 15 127/76    09/13/20 0459 97.7 °F (36.5 °C) 87 16 125/84 96 %   09/13/20 0353 97.6 °F (36.4 °C) 81 16 126/73 97 %   09/12/20 2342 97.9 °F (36.6 °C) 87 18 101/73 98 %     General:          Alert, cooperative, no distress, appears stated age. HEENT:           Atraumatic, anicteric sclerae, pink conjunctivae                          No oral ulcers, mucosa moist, throat clear, dentition fair  Neck:               Supple, symmetrical  Lungs:             Clear to auscultation bilaterally. No Wheezing or Rhonchi. No rales. Chest wall:      No tenderness  No Accessory muscle use. Heart:              Regular  rhythm,  No  murmur   No edema  Abdomen:        Soft, non-tender. Not distended. Bowel sounds normal  Extremities:     No cyanosis. No clubbing,                            Skin turgor normal, Capillary refill normal  Skin:                Not pale. Not Jaundiced  No rashes   Psych:             Not anxious or agitated. Neurologic:      Conscious and alert, moves all extremities, follows simple command      CHRONIC MEDICAL DIAGNOSES:  Problem List as of 9/13/2020 Date Reviewed: 1/28/2020          Codes Class Noted - Resolved    Altered mental status ICD-10-CM: R41.82  ICD-9-CM: 780.97  9/4/2020 - Present        Lithium toxicity ICD-10-CM: H78.395T  ICD-9-CM: 985.8, E980.9  9/4/2020 - Present        Severe obesity (BMI 35.0-39. 9) with comorbidity ([facility] [address] ICD-10-CM: E66.01  ICD-9-CM: 278.01  5/15/2018 - Present IGT (impaired glucose tolerance) ICD-10-CM: R73.02  ICD-9-CM: 790.22  6/10/2016 - Present        Obesity (BMI 30-39. 9) ICD-10-CM: E66.9  ICD-9-CM: 278.00  6/10/2016 - Present        Intellectual disability ICD-10-CM: F79  ICD-9-CM: 661  6/10/2016 - Present        Hyperlipidemia ICD-10-CM: E78.5  ICD-9-CM: 272.4  10/16/2012 - Present        Psychiatric diagnosis ICD-10-CM: F99  ICD-9-CM: 300.9  10/16/2012 - Present              Greater than 45 minutes were spent with the patient on counseling and coordination of care    Signed:   Tam Zeng MD  9/13/2020  10:27 AM

## 2020-09-13 NOTE — PROGRESS NOTES
Bedside and Verbal shift change report given to Mecca De Leon RN (oncoming nurse) by García Jalloh RN (offgoing nurse). Report included the following information SBAR.

## 2020-09-13 NOTE — PROGRESS NOTES
Transition of Care Plan  RUR 15%    Disposition  Discharge today to group home-- [address]  Sharan Petty  [phone]  SULEMA talked with him and he confirmed patient can return today and transported by [facility]   Lynne Hackett  Caretaker at [facility] will provide transport    [address] [facility] [phone]      Patient is non verbal and mentally challenged-- Sulema did talk with him and informed him that he was being discharged today. Lynne Hackett was in the room as well and will transport.

## 2020-09-13 NOTE — PROGRESS NOTES
Physical Therapy:     New orders received, chart reviewed, discussed with RN. Pt was seen by PT on 9/11 (Friday) by this therapist.  Pt is demonstrating good progress toward mobility goals and was requiring modA x2 for transfers and ambulation.   Recommending d/c home with Lin Tena, PT, DPT

## 2020-09-13 NOTE — PROGRESS NOTES
TRANSFER - IN REPORT:    Verbal report received from RN(name) on Luis Farnsworth  being received from 5E(unit) for routine progression of care      Report consisted of patients Situation, Background, Assessment and   Recommendations(SBAR). Information from the following report(s) SBAR was reviewed with the receiving nurse. Opportunity for questions and clarification was provided. Assessment completed upon patients arrival to unit and care assumed.          Primary Nurse Chrissie Medeiros and Orlando Campos RN performed a dual skin assessment on this patient No impairment noted  Ajay score is 16

## 2020-09-14 ENCOUNTER — HOME CARE VISIT (OUTPATIENT)
Dept: SCHEDULING | Facility: HOME HEALTH | Age: 30
End: 2020-09-14
Payer: MEDICAID

## 2020-09-14 ENCOUNTER — HOME CARE VISIT (OUTPATIENT)
Dept: HOME HEALTH SERVICES | Facility: HOME HEALTH | Age: 30
End: 2020-09-14
Payer: MEDICAID

## 2020-09-14 VITALS
OXYGEN SATURATION: 95 % | RESPIRATION RATE: 16 BRPM | SYSTOLIC BLOOD PRESSURE: 138 MMHG | TEMPERATURE: 97 F | HEART RATE: 90 BPM | DIASTOLIC BLOOD PRESSURE: 70 MMHG

## 2020-09-14 VITALS
TEMPERATURE: 97 F | SYSTOLIC BLOOD PRESSURE: 138 MMHG | DIASTOLIC BLOOD PRESSURE: 70 MMHG | HEART RATE: 90 BPM | RESPIRATION RATE: 16 BRPM | OXYGEN SATURATION: 95 %

## 2020-09-14 PROCEDURE — 400013 HH SOC

## 2020-09-14 PROCEDURE — G0151 HHCP-SERV OF PT,EA 15 MIN: HCPCS

## 2020-09-14 PROCEDURE — G0152 HHCP-SERV OF OT,EA 15 MIN: HCPCS

## 2020-09-16 ENCOUNTER — HOME CARE VISIT (OUTPATIENT)
Dept: SCHEDULING | Facility: HOME HEALTH | Age: 30
End: 2020-09-16
Payer: MEDICAID

## 2020-09-16 PROCEDURE — G0157 HHC PT ASSISTANT EA 15: HCPCS

## 2020-09-16 NOTE — ADT AUTH CERT NOTES
Utilization Reviews Add'l Clinical - UR Physician Advisor request for denial reconsideration by Janine Kumari RN Review Entered  Review Status 9/15/2020 15:52  In Primary Criteria Review Patient with lithium toxicisity, with IVF as recommended by Poison Control for 6 
days, then restarted oral lithium. Patient has severe baseline mental 
disability. Send for partial denial reconsideration, schedule p2p as necessary. Franklin Mak MD MBA Senior Physician Advisor [facility] [place] Medications  09/07/20  09/08/20  09/09/20  09/10/20  09/11/20  09/12/20  09/13/20 Completed Medications    
cloNIDine HCL (CATAPRES) tablet 0.1 mg  
Dose: 0.1 mg 
Freq: NOW Route: PO 
Start: 09/04/20 0100 End: 09/04/20 0414 Admin Instructions:           
Hold if: SBP is less than or equal to 90 Order ID: [id number] diphenhydrAMINE (BENADRYL) capsule 25 mg  
Dose: 25 mg 
Freq: NOW Route: PO 
Start: 09/04/20 0047 End: 09/04/20 3751 Order ID: [id number]          
divalproex DR (DEPAKOTE) tablet 1,000 mg Dose: 1,000 mg Freq: NOW Route: PO 
Start: 09/04/20 0100 End: 09/04/20 7522 Admin Instructions:           
Give with food. Do not crush. Order ID: [id number]          
melatonin tablet 4.5 mg  
Dose: 4.5 mg 
Freq: NOW Route: PO 
Start: 09/04/20 0100 End: 09/04/20 0136 Admin Instructions:           
Not intended for use by pregnant or nursing women. Order ID: [id number]          
polyethylene glycol (MIRALAX) packet 17 g Dose: 17 g Freq: NOW Route: PO 
Start: 09/03/20 2345 End: 09/04/20 0024 Admin Instructions:           
Mix in 8 oz of water, juice, soda, coffee, or tea prior to administration Order ID: [id number]          
potassium chloride 10 mEq in 50 ml IVPB Dose: 10 mEq Freq: EVERY 1 HOUR Route: IV Start: 09/06/20 1000 End: 09/06/20 1447 Order ID: [id number] potassium chloride 10 mEq in 50 ml IVPB Dose: 10 mEq Freq: EVERY 1 HOUR Route: IV Start: 09/04/20 1400 End: 09/04/20 1727 Order ID: [id number] potassium chloride SR (KLOR-CON 10) tablet 20 mEq Dose: 20 mEq Freq: 2 TIMES DAILY Route: PO 
Start: 09/07/20 1000 End: 09/09/20 1829  09 (20 mEq) 08 (20 mEq) 08 (20 mEq) Admin Instructions:  17 (20 mEq) 17 (20 mEq) 18 (20 mEq) Do not crush, break or chew. Swallow whole. Order ID: [id number]          
potassium chloride SR (KLOR-CON 10) tablet 20 mEq Dose: 20 mEq Freq: 2 TIMES DAILY Route: PO 
Start: 09/05/20 1800 End: 09/06/20 1708 Admin Instructions:           
If K+ is 3.5-3.6 Do not crush, break or chew. Swallow whole. Order ID: [id number]          
potassium chloride SR (KLOR-CON 10) tablet 40 mEq Dose: 40 mEq Freq: NOW Route: PO 
Start: 09/04/20 0700 End: 09/04/20 2206 Admin Instructions: Do not crush, break or chew. Swallow whole. Order ID: [id number] potassium chloride SR (KLOR-CON 10) tablet 40 mEq Dose: 40 mEq Freq: NOW Route: PO 
Start: 09/03/20 2215 End: 09/03/20 2256 Admin Instructions: Do not crush, break or chew. Swallow whole. Order ID: [id number]          
risperiDONE (RisperDAL) tablet 4 mg Dose: 4 mg Freq: NOW Route: PO 
Start: 09/04/20 0100 End: 09/04/20 9209 Admin Instructions: Do not crush, break or chew. Swallow whole. Order ID: [id number]          
senna-docusate (PERICOLACE) 8.6-50 mg per tablet 2 Tab Dose: 2 Tab Freq: NOW Route: PO 
Start: 09/03/20 2345 End: 09/04/20 0136 Order ID: [id number]          
topiramate (TOPAMAX) tablet 100 mg Dose: 100 mg Freq: NOW Route: PO 
Start: 09/04/20 0100 End: 09/04/20 0136 Admin Instructions: Do not crush, break or chew. Swallow whole. Order ID: [id number] Discontinued Medications Medications  09/07/20  09/08/20  09/09/20  09/10/20  09/11/20  09/12/20  09/13/20    
0.9% sodium chloride infusion Rate: 100 mL/hr Dose: 100 mL/hr Freq: CONTINUOUS Route: IV Start: 09/04/20 0018 End: 09/07/20 1748  09 (100 mL/hr) Order ID: [id number]          
0.9% sodium chloride infusion 250 mL Rate: 250 mL/hr Dose: 250 mL Freq: CONTINUOUS Route: IV Start: 09/04/20 0014 End: 09/04/20 0017 Admin Instructions:           
Continue until lithium level is within normal reference range Order ID: [id number]          
acetaminophen (TYLENOL) tablet 650 mg  
Dose: 650 mg 
Freq: EVERY 6 HOURS AS NEEDED Route: PO 
PRN Reasons: Mild Pain,Fever PRN Comment: For temp greater than 100.4 F (38 C) Start: 09/04/20 0005 End: 09/13/20 1634    09 (650 mg) Admin Instructions:    18 (650 mg) Yobany Fruit Order ID: [id number] Or 
acetaminophen (TYLENOL) suppository 650 mg  
Dose: 650 mg 
Freq: EVERY 6 HOURS AS NEEDED Route: RE 
PRN Reasons: Pain,Fever PRN Comment: For temp greater than 100.4 F (38 C) Start: 09/04/20 0005 End: 09/13/20 1634    09-See Alt Admin Instructions:    18-See Alt Administer if oral route cannot be used. Order ID: [id number]          
aspirin delayed-release tablet 81 mg  
Dose: 81 mg 
Freq: DAILY Route: PO 
Start: 09/04/20 0900 End: 09/13/20 1634  09 (81 mg) 08 (81 mg) 08 (81 mg) 09 (81 mg) 11 (81 mg) 09 (81 mg) 10 (81 mg) Admin Instructions: Do not crush, break or chew. Swallow whole. Order ID: [id number]          
cloNIDine HCL (CATAPRES) tablet 0.1 mg  
Dose: 0.1 mg 
Freq: 2 TIMES DAILY Route: PO 
Start: 09/04/20 0900 End: 09/13/20 1634  09 (0.1 mg) 08 (0.1 mg) 08 (0.1 mg) 09 (0.1 mg) 11 (0.1 mg) 09 
 10 (0.1 mg) Admin Instructions:  17 (0.1 mg) 17 (0.1 mg) 18 (0.1 mg) 17 (0.1 mg) 19 (0.1 mg) 18 (0.1 mg) HOLD FOR SYSTOLIC BLOOD PRESSURE < 120 mmHg OR HEART RATE < 60 bpm           
Order ID: [id number]          
dextrose 5% infusion Rate: 50 mL/hr Dose: 50 mL/hr Freq: CONTINUOUS Route: IV Start: 09/09/20 1200 End: 09/13/20 1634     18 (75 mL/hr) 12 [C] Order ID: [id number]          
diphenhydrAMINE (BENADRYL) capsule 50 mg  
Dose: 50 mg 
Freq: EVERY BEDTIME Route: PO 
Start: 09/04/20 2200 End: 09/13/20 1634  23 (50 mg) 21 (50 mg) 23 (50 mg) 23 (50 mg) 21 (50 mg) 22 (50 mg) Order ID: [id number]          
divalproex DR (DEPAKOTE) tablet 1,000 mg Dose: 1,000 mg Freq: 3 TIMES DAILY Route: PO 
Start: 09/04/20 1600 End: 09/13/20 1634  09 (1,000 mg) 08 (1,000 mg) 08 (1,000 mg) 09 (1,000 mg) 11 (1,000 mg) 09 (1,000 mg) 10 (1,000 mg) Admin Instructions:  15 (1,000 mg) 16 (1,000 mg) 16 (1,000 mg) 16 (1,000 mg) 15 (1,000 mg) 16 (1,000 mg) Give with food. Do not crush.  23 (1,000 mg) 21 (1,000 mg) 23 (1,000 mg) 23 (1,000 mg) 21 (1,000 mg) 22 (1,000 mg) Order ID: [id number]          
enoxaparin (LOVENOX) injection 30 mg  
Dose: 30 mg 
Freq: EVERY 12 HOURS Route: SC 
Start: 09/04/20 0100 End: 09/04/20 1553 Admin Instructions:           
Administer by deep subCUTAneous injection with pt lying down. Alternate injection sites on abdominal wall. Do not rub site after injection. Check with MD prior to any invasive procedure. If administering partial syringe, nurse to expel the air bubble and the excess drug prior to administering the dose Order ID: [id number]          
enoxaparin (LOVENOX) injection 40 mg  
Dose: 40 mg 
Freq: EVERY 24 HOURS Route: SC 
Start: 09/05/20 1241 End: 09/13/20 1634  13 (40 mg) 14 (40 mg) (13) 
 14 (40 mg) [C] 15 (40 mg) 16 (40 mg) 15 Admin Instructions:           
Administer by deep subCUTAneous injection with pt lying down.  Alternate injection sites on abdominal wall. Do not rub site after injection. Check with MD prior to any invasive procedure. If administering partial syringe, nurse to expel the air bubble and the excess drug prior to administering the dose Order ID: [id number]          
lactulose (CHRONULAC) 10 gram/15 mL solution 15 mL Dose: 10 g Freq: EVERY EVENING Route: RE 
Start: 09/04/20 1800 End: 09/04/20 1309 Admin Instructions:           
Report diarrhea to provider. Order ID: [id number]          
lactulose (CHRONULAC) 10 gram/15 mL solution 30 mL Dose: 30 mL Freq: EVERY BEDTIME Route: PO 
Start: 09/04/20 2200 End: 09/13/20 1634  (23) [C] 21 (30 mL) 23 (30 mL) 23 (30 mL) 21 (30 mL) 22 (30 mL) Admin Instructions:           
Report diarrhea to provider. Order ID: [id number]          
lactulose (CHRONULAC) 10 gram/15 mL solution 45 mL Dose: 45 mL Freq: DAILY Route: PO 
Start: 09/05/20 0900 End: 09/13/20 1634  09 (45 mL) (09) 
 08 (45 mL) 09 (45 mL) 11 (45 mL) 09 (45 mL) 10 (45 mL) Admin Instructions:           
Report diarrhea to provider. Order ID: [id number]          
lithium carbonate SR (LITHOBID) tablet 300 mg Dose: 300 mg Freq: EVERY BEDTIME Route: PO 
Start: 09/09/20 1700 End: 09/13/20 1634    17 
 23 (300 mg) 22 (300 mg) 22 (300 mg) Admin Instructions: Do not crush, break or chew. Swallow whole. Order ID: [id number] LORazepam (ATIVAN) tablet 1 mg Dose: 1 mg Freq: 2 TIMES DAILY AS NEEDED Route: PO 
PRN Reason: Agitation Start: 09/04/20 1400 End: 09/13/20 1634   05 (1 mg) 04 (1 mg) 07 (1 mg) 16 (1 mg) Admin Instructions:    12 (1 mg) 23 (1 mg) HOLD FOR SBP < 110, RR < 12, O2SAT < 95%, OR CHANGE IN MENTAL STATUS Order ID: [id number] LORazepam (ATIVAN) tablet 1 mg Dose: 1 mg Freq: 3 TIMES DAILY Route: PO 
 Start: 09/04/20 0900 End: 09/04/20 1309 Admin Instructions: HOLD FOR SBP < 110, RR < 12, O2SAT < 95%, OR CHANGE IN MENTAL STATUS Order ID: [id number]          
melatonin tablet 4.5 mg  
Dose: 4.5 mg 
Freq: EVERY BEDTIME Route: PO 
Start: 09/04/20 2200 End: 09/13/20 1634   00 (4.5 mg) 23 (4.5 mg) 23 (4.5 mg) 21 (4.5 mg) 22 (4.5 mg) Admin Instructions:   21 (4.5 mg) Not intended for use by pregnant or nursing women. Order ID: [id number]          
potassium chloride 10 mEq in 100 ml IVPB Dose: 10 mEq Freq: EVERY 1 HOUR Route: IV Start: 09/03/20 2215 End: 09/04/20 0214 Admin Instructions:           
Preferred peripheral concentration Order ID: [id number]          
potassium chloride 10 mEq in 50 ml IVPB Dose: 10 mEq Freq: EVERY 1 HOUR Route: IV Start: 09/07/20 0900 End: 09/07/20 1259  09 (10 mEq) 11 (10 mEq) 12 
         
Order ID: [id number] 46 (10 mEq) potassium chloride SR (KLOR-CON 10) tablet 40 mEq Dose: 40 mEq Freq: NOW Route: PO 
Start: 09/04/20 1200 End: 09/04/20 2359 Admin Instructions: Do not crush, break or chew. Swallow whole. Order ID: [id number] QUEtiapine (SEROquel) tablet 200 mg Dose: 200 mg Freq: 3 TIMES DAILY Route: PO 
Start: 09/04/20 1600 End: 09/13/20 1634  09 (200 mg) 08 (200 mg) 08 (200 mg) 09 (200 mg) 11 (200 mg) 09 (200 mg) 10 (200 mg) 15 (200 mg) 16 (200 mg) 16 (200 mg) 16 (200 mg) 15 (200 mg) 16 (200 mg) Order ID: [id number] 93 (200 mg) 21 (200 mg) 23 (200 mg) 23 (200 mg) 21 (200 mg) 22 (200 mg) QUEtiapine (SEROquel) tablet 200 mg Dose: 200 mg Freq: 2 TIMES DAILY Route: PO 
Start: 09/04/20 0900 End: 09/04/20 1309 Order ID: [id number] QUEtiapine (SEROquel) tablet 200 mg Dose: 200 mg Freq: NOW Route: PO 
 Start: 09/04/20 0047 End: 09/04/20 6129 Order ID: [id number]          
risperiDONE (RisperDAL) tablet 2 mg Dose: 2 mg Freq: DAILY Route: PO 
Start: 09/05/20 0900 End: 09/13/20 1634  09 (2 mg) 08 (2 mg) 08 (2 mg) 09 (2 mg) 11 (2 mg) 09 (2 mg) 10 (2 mg) Admin Instructions: Do not crush, break or chew. Swallow whole. Order ID: [id number]          
risperiDONE (RisperDAL) tablet 4 mg Dose: 4 mg Freq: EVERY BEDTIME Route: PO 
Start: 09/04/20 2200 End: 09/13/20 1634  23 (4 mg) 21 (4 mg) 23 (4 mg) 23 (4 mg) 23 (4 mg) 22 (4 mg) Admin Instructions: Do not crush, break or chew. Swallow whole. Order ID: [id number]          
topiramate (TOPAMAX) tablet 100 mg Dose: 100 mg Freq: 2 TIMES DAILY Route: PO 
Start: 09/04/20 0900 End: 09/13/20 1634  09 (100 mg) 08 (100 mg) 08 (100 mg) 09 (100 mg) 11 (100 mg) 09 (100 mg) 10 (100 mg) Admin Instructions:  17 (100 mg) 17 (100 mg) 18 (100 mg) 17 (100 mg) 19 (100 mg) 18 (100 mg) Do not crush, break or chew. Swallow whole. Potentially hazardous if crushed powder is inhaled; oral suspension available. Order ID: [id number]          
ziprasidone (GEODON) capsule 80 mg  
Dose: 80 mg 
Freq: 2 TIMES DAILY WITH MEALS Route: PO 
Start: 09/04/20 0800 End: 09/04/20 1306 Admin Instructions: HOLD FOR SBP < 110, RR < 12, O2SAT < 95%, OR CHANGE IN MENTAL STATUS Order ID: [id number] Medications  09/07/20  09/08/20  09/09/20  09/10/20  09/11/20  09/12/20  09/13/20 Systemic or Infectious Condition [facility] - Care Day 9 (9/12/2020) by Denice Drummond RN Review Entered  Review Status 9/15/2020 11:06  Completed Criteria Review Care Day: 9 Care Date: 9/12/2020 Level of Care: Telemetry Guideline Day 3 Level Of Care  
  
(X) * Activity level acceptable  
  
(X) Floor to discharge Clinical Status (X) * Hemodynamic stability 9/15/2020 11:06:27 EDT by Daniel Pierre /66 [id number]  
  
(X) * Respiratory status acceptable 9/15/2020 11:06:27 EDT by Daniel Pierre Resp18 O2 sat 100% RA Resp: CTA bilaterally. No wheezing/rhonchi/rales. No accessory muscle use  
  
(X) * Neurologic status acceptable 9/15/2020 11:06:27 EDT by Daniel Pierre Neurologic:Conscious and alert, aphasic, follows simple command, moves all extremities  
  
(X) * Temperature status acceptable 9/15/2020 11:06:27 EDT by Daniel Pierre Temp97.6 °F (36.4 °C) (X) * No infection, or status acceptable  
  
(X) * No neutropenia, or status acceptable  
  
(X) * Special infection or injury situations absent  
  
(X) * Electrolyte status acceptable 9/15/2020 11:06:27 EDT by Daniel Pierre  
K3.9 *  BUN4* 
CREA0.63* Elisabeth Charleston. 6 ALT14 AP62 TBILI0.3 TP5.3* ALB2.0* 
GLOB3.3  
  
( ) * General Discharge Criteria met Interventions  
  
(X) * Intake acceptable 9/15/2020 11:06:27 EDT by Daniel Pierre Topamax 100mg po 2x/day Ativan 1 mg po 2x/day prn x 2 doses 9/15/2020 11:03:52 EDT by Daniel Pierre  
  
regular diet ASA 81mg po q day Catapress 0.1 mg po 2x/day Benadryl 50mg po q hs Depakote 1,000 mg po 3x/day Lactulose 45 ml po q day Lithobid 300mg po q hs Melatonin 4.5 mg po q hs Seroquel 200mg po 3x/day Risperdal 2mg po q day Risperdal 4mg po q hs ( ) * No inpatient interventions needed * Milestone Additional Notes 9/12/20 Patient is conscious and alert, aphasic but makes some voices Physical Examination:   
  
  
  
Constitutional: No acute distress, cooperative, pleasant ENT: Oral mucosa moist, oropharynx benign. Resp: CTA bilaterally. No wheezing/rhonchi/rales. No accessory muscle use CV: Regular rhythm, normal rate, no murmurs, gallops, rubs GI: Soft, non distended, non tender. normoactive bowel sounds, no hepatosplenomegaly Musculoskeletal: No edema Assessment & Plan:   
  
Lithium toxicity, improved   
-lithium was held initially, on IV fluid per recommendation from poison control   
-psychiatry consulted , no additional recommendation   
-lithium level WNL, restarted on lithium medications on a lower dose 300 mg at bedtime on 9/9   
-lithium level was 0.51 on 9/12 Tremulousness likely due to debility   
-seen by neurologist and recommended PT Altered mental status   
-Secondary to lithium toxicity and electrolyte derangements   
-improving, conscious and alert, appears at base line   
-continue neuro check and supportive care HTN   
-BP normal, continue clonidine and monitor BP History of psychosis   
-On multiple psych medications, resume home meds; risperidone, seroquel, lithium, topamax and depakote Hypokalemia   
-replaced and resolved Hypernatremia   
-improved, Na 145, cut back D5W and repeat BMP in am   
  
Hx of psychosis   
-on seroquel, risperidone, dpakote and topamax Hx of mental retardation   
-continue supportive care Obesity   
-Patient follow-up for weight management COVID 19 test negative on 9/10 Code status: Full Code DVT prophylaxis: Lovenox Care Plan discussed with: Patient/Family, Nurse and  Anticipated Disposition: Group Home Anticipated Discharge: Greater than 48 hours I called and updated his guardian, Paul Hilario at , questions answered on 9/12 No imaging Orders PT/OT Lovenox 40mg SQ q 24hrs Systemic or Infectious Condition [facility] - Care Day 8 (9/11/2020) by Allie Cardenas RN Review Entered  Review Status 9/15/2020 10:59  Completed Criteria Review Care Day: 8 Care Date: 9/11/2020 Level of Care: Telemetry Guideline Day 3 Level Of Care (X) * Activity level acceptable  
  
(X) Floor to discharge Clinical Status  
  
(X) * Hemodynamic stability 9/15/2020 10:59:04 EDT by Daisy Packer /72 [id number]  
  
(X) * Respiratory status acceptable 9/15/2020 10:59:04 EDT by Daisy Packer Resp18 O2 sat 98% RA Resp: CTA bilaterally. No wheezing/rhonchi/rales. No accessory muscle use  
  
(X) * Neurologic status acceptable  
  
(X) * Temperature status acceptable 9/15/2020 10:59:04 EDT by Daisy Packer Temp98.4 °F (36.9 °C) (X) * No infection, or status acceptable  
  
(X) * No neutropenia, or status acceptable  
  
(X) * Special infection or injury situations absent  
  
(X) * Electrolyte status acceptable 9/15/2020 10:59:04 EDT by Daisy Packer  
K4.0   BUN3* 
CREA0.63* Jaylen.Pennington* CA8.9  
  
( ) * General Discharge Criteria met Interventions  
  
(X) * Intake acceptable 9/15/2020 10:59:23 EDT by Daisy Packer Topamax 100mg po 2x/day 9/15/2020 10:59:04 EDT by Daisy Packer  
  
regular diet ASA 81mg po q day Catapress 0.1 mg po 2x/day Benadryl 50mg po q hs Depakote 1,000 mg po 3x/day Lactulose 45 ml po q day Lithobid 300mg po q hs Melatonin 4.5 mg po q hs Seroquel 200mg po 3x/day Risperdal 2mg po q day Risperdal 4mg po q hs ( ) * No inpatient interventions needed * Milestone Additional Notes 9/11/20 Patient is conscious and alert, aphasic but makes some voices Physical Examination:   
  
  
  
Constitutional: No acute distress, cooperative, pleasant ENT: Oral mucosa moist, oropharynx benign. Resp: CTA bilaterally. No wheezing/rhonchi/rales. No accessory muscle use CV: Regular rhythm, normal rate, no murmurs, gallops, rubs GI: Soft, non distended, non tender. normoactive bowel sounds, no hepatosplenomegaly Musculoskeletal: No edema Neurologic: Conscious and alert, aphasic, follows simple command, moves all extremities Skin: Good turgor, no rashes or ulcers WBC 4.9 HGB 11.3* HCT 38.0   
 ALT 16   
AP 80 TBILI 0.3 TP 6.3* ALB 2.5*   
GLOB 3.8 Assessment & Plan:   
  
Lithium toxicity, improved   
-lithium was held, on IV fluid per recommendation from poison control   
-psychiatry consulted , no additional recommendation   
-lithium level WNL, restarted on lithium medications on a lower dose 300 mg at bedtime on 9/9   
-lithium level was 0.88 on 9/8 Tremulousness likely due to debility   
-seen by neurologist and recommended PT Altered mental status, improving   
-Secondary to lithium toxicity and electrolyte derangements   
-continue neuro check and supportive care HTN   
-BP normal, continue clonidine and monitor BP History of psychosis   
-On multiple psych medications, resume home meds; risperidone, seroquel, lithium, topamax and depakote Hypokalemia   
-replaced and resolved Hypernatremia   
-Na 148, continue D5W and repeat BMP in am   
  
Hx of psychosis   
-on seroquel, risperidone, dpakote and topamax Hx of mental retardation   
-continue supportive care Obesity   
-Patient follow-up for weight management COVID 19 test negative on 9/10 Code status: Full Code DVT prophylaxis: Lovenox Care Plan discussed with: Patient/Family, Nurse and  Anticipated Disposition: Group Home Anticipated Discharge: Greater than 48 hours I called and updated his guardian, Xochitl Mcdaniels at , questions answered PT Note ASSESSMENT Patient continues with skilled PT services and is progressing towards goals. Pt agreeable and able to participate in session this date.  He transferred supine>sit with Annette, sit<>stand with modA x2, and took side steps along EOB with modA x2. While taking steps, he demonstrated posterior trunk lean and narrow steps but improving compared to last session. Recommending return group home upon discharge. Current Level of Function Impacting Discharge (mobility/balance): modA x2 for transfers Other factors to consider for discharge: intelectual disability, has 1:1 caregiver at baseline, PLAN :   
Patient continues to benefit from skilled intervention to address the above impairments. Continue treatment per established plan of care. to address goals. Recommendation for discharge: (in order for the patient to meet his/her long term goals) Return to group home No imaging Orders PT/OT Lovenox 40mg SQ q 24hrs

## 2020-09-18 ENCOUNTER — OFFICE VISIT (OUTPATIENT)
Dept: FAMILY MEDICINE CLINIC | Age: 30
End: 2020-09-18
Payer: MEDICAID

## 2020-09-18 VITALS
HEIGHT: 75 IN | DIASTOLIC BLOOD PRESSURE: 80 MMHG | OXYGEN SATURATION: 98 % | HEART RATE: 90 BPM | SYSTOLIC BLOOD PRESSURE: 114 MMHG | RESPIRATION RATE: 14 BRPM | BODY MASS INDEX: 34.17 KG/M2 | WEIGHT: 274.8 LBS | TEMPERATURE: 98 F

## 2020-09-18 DIAGNOSIS — Z23 NEEDS FLU SHOT: ICD-10-CM

## 2020-09-18 DIAGNOSIS — E66.01 SEVERE OBESITY (BMI 35.0-39.9) WITH COMORBIDITY (HCC): Primary | ICD-10-CM

## 2020-09-18 DIAGNOSIS — R73.02 IGT (IMPAIRED GLUCOSE TOLERANCE): ICD-10-CM

## 2020-09-18 DIAGNOSIS — Z23 ENCOUNTER FOR IMMUNIZATION: ICD-10-CM

## 2020-09-18 DIAGNOSIS — E78.2 MIXED HYPERLIPIDEMIA: ICD-10-CM

## 2020-09-18 DIAGNOSIS — F79 INTELLECTUAL DISABILITY: ICD-10-CM

## 2020-09-18 PROCEDURE — 90686 IIV4 VACC NO PRSV 0.5 ML IM: CPT

## 2020-09-18 PROCEDURE — 99214 OFFICE O/P EST MOD 30 MIN: CPT | Performed by: NURSE PRACTITIONER

## 2020-09-18 PROCEDURE — 90471 IMMUNIZATION ADMIN: CPT

## 2020-09-18 NOTE — PROGRESS NOTES
Chief Complaint   Patient presents with    Complete Physical     1. Have you been to the ER, urgent care clinic since your last visit? Hospitalized since your last visit? Yes When: [facility] 9/3/20 [facility]     2. Have you seen or consulted any other health care providers outside of the [address] [place] [address] since your last visit? Include any pap smears or colon screening.  No

## 2020-09-18 NOTE — PROGRESS NOTES
[address] Note    Airamguerita Paige Sanford is a 31 y.o. male who was seen in clinic today (9/21/2020). Subjective:  Cardiovascular Review:  The patient has hypertension and obesity. Diet and Lifestyle: generally follows a low fat low cholesterol diet, generally follows a low sodium diet, exercises sporadically, nonsmoker  Home BP Monitoring: is not measured at home. Pertinent ROS: taking medications as instructed, no medication side effects noted, no TIA's, no chest pain on exertion, no dyspnea on exertion, no swelling of ankles. Psychiatry Review:  Patient admitted to [facility] [place] for AMS s/t elevated lithium level from 9/3-9/13/20. COVID 19 test negative on 9/10. Patient is treated for bipolar disorder, MR. Patient seen routinely by psychiatry, Dr Hayden Dillon. Last Hemoglobin A1c: 4.4 (11/2019). Diabetic medications have been discontinued. Prior to Admission medications    Medication Sig Start Date End Date Taking? Authorizing Provider   divalproex DR (Depakote) 500 mg tablet Take 1,000 mg by mouth three (3) times daily. Yes Provider, Historical   melatonin 5 mg tablet Take 5 mg by mouth nightly. Yes Provider, Historical   risperiDONE (RisperDAL) 4 mg tablet Take 4 mg by mouth nightly. Yes Provider, Historical   lithium carbonate SR (LITHOBID) 300 mg CR tablet Take 300 mg by mouth nightly. Takes with 450 mg tab for total of 750 mg   Yes Provider, Historical   potassium chloride SR (KLOR-CON 10) 10 mEq tablet TAKE 1 TABLET BY MOUTH DAILY 12/3/19  Yes Raleigh Thacker NP   QUEtiapine (SEROQUEL) 200 mg tablet Take 200 mg by mouth three (3) times daily. Yes Provider, Historical   aspirin delayed-release 81 mg tablet TAKE 1 TABLET DAILY. 11/15/19  Yes Raleigh Thacker NP   cloNIDine HCl (CATAPRES) 0.1 mg tablet TAKE 1 TABLET BY MOUTH TWICE A DAY 11/15/19  Yes Raleigh Thacker NP   risperiDONE (RisperDAL) 2 mg tablet Take 2 mg by mouth daily.    Yes Provider, Historical   topiramate (TOPAMAX) 100 mg tablet Take 100 mg by mouth two (2) times a day. Yes Provider, Historical          Allergies   Allergen Reactions    Chocolate Flavor Unknown (comments)           Review of Systems   Unable to perform ROS: Psychiatric disorder   Gastrointestinal: Negative for blood in stool. Objective:   Physical Exam  Vitals signs and nursing note reviewed. Constitutional:       General: He is not in acute distress. Appearance: He is well-developed. HENT:      Right Ear: Tympanic membrane and ear canal normal.      Left Ear: Tympanic membrane and ear canal normal.      Nose: No mucosal edema. Right Sinus: No maxillary sinus tenderness or frontal sinus tenderness. Left Sinus: No maxillary sinus tenderness or frontal sinus tenderness. Eyes:      Pupils: Pupils are equal, round, and reactive to light. Neck:      Musculoskeletal: Normal range of motion and neck supple. Thyroid: No thyromegaly. Vascular: No carotid bruit or JVD. Cardiovascular:      Rate and Rhythm: Normal rate and regular rhythm. Heart sounds: Normal heart sounds. No murmur. No friction rub. No gallop. Pulmonary:      Effort: Pulmonary effort is normal. No respiratory distress. Breath sounds: Normal breath sounds. No decreased breath sounds, wheezing or rhonchi. Abdominal:      General: Bowel sounds are normal. There is no distension. Palpations: Abdomen is soft. Tenderness: There is no abdominal tenderness. Lymphadenopathy:      Cervical: No cervical adenopathy. Neurological:      Mental Status: He is alert.       Comments: Left sided weakness - chronic   Psychiatric:         Behavior: Behavior normal.         Visit Vitals  /80 (BP 1 Location: Left arm, BP Patient Position: Sitting)   Pulse 90   Temp 98 °F (36.7 °C)   Resp 14   Ht 6' 3\" (1.905 m)   Wt 274 lb 12.8 oz (124.6 kg)   SpO2 98%   BMI 34.35 kg/m²       Assessment & Plan:  Diagnoses and all orders for this visit:    1. Severe obesity (BMI 35.0-39. 9) with comorbidity ([address]  Discussed need for weight loss through diet and exercise. Reviewed decreased caloric intake and increased activity. 2. IGT (impaired glucose tolerance)  Reviewed diet and lifestyle changes. 3. Intellectual disability  Stable, no changes to current therapy    4. Mixed hyperlipidemia  On statin therapy. 5. Encounter for immunization  -     ADMIN INFLUENZA VIRUS VAC  -     INFLUENZA VIRUS VAC QUAD,SPLIT,PRESV FREE SYRINGE IM      I have discussed the diagnosis with the patient and the intended plan as seen in the above orders. The patient has received an after-visit summary along with patient information handout. I have discussed medication side effects and warnings with the patient as well.             Asiya Fernandez NP

## 2020-09-21 ENCOUNTER — HOME CARE VISIT (OUTPATIENT)
Dept: SCHEDULING | Facility: HOME HEALTH | Age: 30
End: 2020-09-21
Payer: MEDICAID

## 2020-09-21 VITALS
OXYGEN SATURATION: 98 % | RESPIRATION RATE: 18 BRPM | HEART RATE: 70 BPM | SYSTOLIC BLOOD PRESSURE: 122 MMHG | TEMPERATURE: 98.2 F | DIASTOLIC BLOOD PRESSURE: 70 MMHG

## 2020-09-21 PROBLEM — T56.891A LITHIUM TOXICITY: Status: RESOLVED | Noted: 2020-09-04 | Resolved: 2020-09-21

## 2020-09-21 PROBLEM — R41.82 ALTERED MENTAL STATUS: Status: RESOLVED | Noted: 2020-09-04 | Resolved: 2020-09-21

## 2020-09-21 PROCEDURE — G0152 HHCP-SERV OF OT,EA 15 MIN: HCPCS

## 2020-09-23 ENCOUNTER — HOME CARE VISIT (OUTPATIENT)
Dept: SCHEDULING | Facility: HOME HEALTH | Age: 30
End: 2020-09-23
Payer: MEDICAID

## 2020-09-23 PROCEDURE — G0157 HHC PT ASSISTANT EA 15: HCPCS

## 2020-09-24 VITALS
RESPIRATION RATE: 16 BRPM | HEART RATE: 72 BPM | DIASTOLIC BLOOD PRESSURE: 68 MMHG | TEMPERATURE: 98.3 F | SYSTOLIC BLOOD PRESSURE: 124 MMHG | OXYGEN SATURATION: 96 %

## 2020-09-25 ENCOUNTER — HOME CARE VISIT (OUTPATIENT)
Dept: SCHEDULING | Facility: HOME HEALTH | Age: 30
End: 2020-09-25
Payer: MEDICAID

## 2020-09-25 PROCEDURE — G0157 HHC PT ASSISTANT EA 15: HCPCS

## 2020-09-26 VITALS
RESPIRATION RATE: 16 BRPM | SYSTOLIC BLOOD PRESSURE: 120 MMHG | DIASTOLIC BLOOD PRESSURE: 75 MMHG | HEART RATE: 74 BPM | OXYGEN SATURATION: 97 % | TEMPERATURE: 97.8 F

## 2020-09-28 ENCOUNTER — HOME CARE VISIT (OUTPATIENT)
Dept: SCHEDULING | Facility: HOME HEALTH | Age: 30
End: 2020-09-28
Payer: MEDICAID

## 2020-09-28 PROCEDURE — G0157 HHC PT ASSISTANT EA 15: HCPCS

## 2020-09-29 ENCOUNTER — HOME CARE VISIT (OUTPATIENT)
Dept: SCHEDULING | Facility: HOME HEALTH | Age: 30
End: 2020-09-29
Payer: MEDICAID

## 2020-09-29 ENCOUNTER — HOME CARE VISIT (OUTPATIENT)
Dept: HOME HEALTH SERVICES | Facility: HOME HEALTH | Age: 30
End: 2020-09-29
Payer: MEDICAID

## 2020-09-29 VITALS
TEMPERATURE: 98.5 F | HEART RATE: 70 BPM | DIASTOLIC BLOOD PRESSURE: 80 MMHG | SYSTOLIC BLOOD PRESSURE: 136 MMHG | OXYGEN SATURATION: 98 % | RESPIRATION RATE: 18 BRPM

## 2020-09-29 VITALS
HEART RATE: 80 BPM | TEMPERATURE: 97.9 F | RESPIRATION RATE: 16 BRPM | SYSTOLIC BLOOD PRESSURE: 137 MMHG | DIASTOLIC BLOOD PRESSURE: 68 MMHG | OXYGEN SATURATION: 97 %

## 2020-09-29 PROCEDURE — G0152 HHCP-SERV OF OT,EA 15 MIN: HCPCS

## 2020-09-30 ENCOUNTER — HOME CARE VISIT (OUTPATIENT)
Dept: SCHEDULING | Facility: HOME HEALTH | Age: 30
End: 2020-09-30
Payer: MEDICAID

## 2020-09-30 NOTE — PROGRESS NOTES
patient discharged to date to group home with assistance, one on one care and 24 hour assistance, patient alert, oriented to person and place, self. 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 patient tolerated therapy fair to well, denies pain. patient tremors still present but minor and rarely interfere, patient has and is utilizing weighted built up utensils effectively for eating at setup assi stance, faciltated transfers at mod I, patient bed mobility transfers to and from commode and shower at mod i to supervision level of assistance, patient compliant and pleasant with education and staff compliant with recommendations, patient at standby for dressing and bathing, grooming at stand by with mod cues for attentiong to task and coordination, patient has grab bars in shower for transfers to improve fall prevention and safety.  good status noted. patient has returned to baseline, exercising daily with supervision, patient has low tone and naturally sedentary behaviors, improvements noted with compliance for improved activity level. patient agreed to discharge, house staff educated on medical status and discharge, patient at end of session in sitting resting prior to outing for lunch with group. 4/5 BUE average strength, improved status with adls due to strengt h improvements, no falls to report.

## 2020-10-05 ENCOUNTER — TELEPHONE (OUTPATIENT)
Dept: FAMILY MEDICINE CLINIC | Age: 30
End: 2020-10-05

## 2020-10-05 NOTE — TELEPHONE ENCOUNTER
----- Message from Chris Rudolph sent at 10/5/2020 10:27 AM EDT -----  Regarding: FAM Thacker/Telephone  Caller's first and last name: Aaron Morse (caregiver)  Reason for call: Requesting bloodwork for pt. Callback required yes/no and why: yes  Best contact number(s): [phone]  Details to clarify the request: Stated that pt was informed by [place] nurse to get his hemoglobin levels checked for diabetes. Would also like to have a checkup for tremors in both hands.

## 2020-10-08 ENCOUNTER — OFFICE VISIT (OUTPATIENT)
Dept: FAMILY MEDICINE CLINIC | Age: 30
End: 2020-10-08
Payer: MEDICAID

## 2020-10-08 VITALS
SYSTOLIC BLOOD PRESSURE: 126 MMHG | BODY MASS INDEX: 32.8 KG/M2 | HEIGHT: 75 IN | HEART RATE: 66 BPM | DIASTOLIC BLOOD PRESSURE: 81 MMHG | WEIGHT: 263.8 LBS | TEMPERATURE: 97.6 F

## 2020-10-08 DIAGNOSIS — Z23 NEEDS FLU SHOT: ICD-10-CM

## 2020-10-08 DIAGNOSIS — Z23 ENCOUNTER FOR IMMUNIZATION: ICD-10-CM

## 2020-10-08 DIAGNOSIS — I10 ESSENTIAL HYPERTENSION: ICD-10-CM

## 2020-10-08 DIAGNOSIS — F79 INTELLECTUAL DISABILITY: ICD-10-CM

## 2020-10-08 DIAGNOSIS — R79.89 ELEVATED LITHIUM LEVEL: Primary | ICD-10-CM

## 2020-10-08 PROCEDURE — 90686 IIV4 VACC NO PRSV 0.5 ML IM: CPT | Performed by: NURSE PRACTITIONER

## 2020-10-08 PROCEDURE — 99213 OFFICE O/P EST LOW 20 MIN: CPT | Performed by: NURSE PRACTITIONER

## 2020-10-08 PROCEDURE — 90471 IMMUNIZATION ADMIN: CPT | Performed by: NURSE PRACTITIONER

## 2020-10-08 NOTE — PROGRESS NOTES
[facility] Note    Esperanza Fuentes is a 31 y.o. male who was seen in clinic today (10/8/2020). Subjective:  Cardiovascular Review:  The patient has hypertension and obesity. Diet and Lifestyle: generally follows a low fat low cholesterol diet, generally follows a low sodium diet, exercises sporadically, nonsmoker  Home BP Monitoring: is not measured at home. Pertinent ROS: taking medications as instructed, no medication side effects noted, no TIA's, no chest pain on exertion, no dyspnea on exertion, no swelling of ankles. Psychiatry Review:  Patient admitted to [facility] [place] for AMS s/t elevated lithium level from 9/3-9/13/20. COVID 19 test negative on 9/10. Patient is treated for bipolar disorder, MR. Patient seen routinely by psychiatry, Dr Endy Blackburn. Last Hemoglobin A1c: 4.4 (11/2019). Diabetic medications have been discontinued. Prior to Admission medications    Medication Sig Start Date End Date Taking? Authorizing Provider   divalproex DR (Depakote) 500 mg tablet Take 1,000 mg by mouth three (3) times daily. Provider, Historical   melatonin 5 mg tablet Take 5 mg by mouth nightly. Provider, Historical   risperiDONE (RisperDAL) 4 mg tablet Take 4 mg by mouth nightly. Provider, Historical   lithium carbonate SR (LITHOBID) 300 mg CR tablet Take 300 mg by mouth nightly. Takes with 450 mg tab for total of 750 mg    Provider, Historical   potassium chloride SR (KLOR-CON 10) 10 mEq tablet TAKE 1 TABLET BY MOUTH DAILY 12/3/19   Gunner Arias NP   QUEtiapine (SEROQUEL) 200 mg tablet Take 200 mg by mouth three (3) times daily. Provider, Historical   aspirin delayed-release 81 mg tablet TAKE 1 TABLET DAILY. 11/15/19   Gunner Arias NP   cloNIDine HCl (CATAPRES) 0.1 mg tablet TAKE 1 TABLET BY MOUTH TWICE A DAY 11/15/19   Gunner Arias NP   risperiDONE (RisperDAL) 2 mg tablet Take 2 mg by mouth daily.     Provider, Historical   topiramate (TOPAMAX) 100 mg tablet Take 100 mg by mouth two (2) times a day. Provider, Historical          Allergies   Allergen Reactions    Chocolate Flavor Unknown (comments)           ROS  See HPI    Objective:   Physical Exam  Vitals signs and nursing note reviewed. Constitutional:       Appearance: He is well-developed. Cardiovascular:      Rate and Rhythm: Normal rate and regular rhythm. Heart sounds: No murmur. No friction rub. No gallop. Pulmonary:      Effort: Pulmonary effort is normal. No respiratory distress. Breath sounds: Normal breath sounds. Neurological:      Mental Status: He is alert. Psychiatric:         Speech: Speech normal.         Behavior: Behavior normal.         Thought Content: Thought content normal.           Visit Vitals  /81 (BP 1 Location: Right arm, BP Patient Position: Sitting)   Pulse 66   Temp 97.6 °F (36.4 °C) (Temporal)   Ht 6' 3\" (1.905 m)   Wt 263 lb 12.8 oz (119.7 kg)   BMI 32.97 kg/m²       Assessment & Plan:  Diagnoses and all orders for this visit:    1. Elevated lithium level  Recheck labs following hospital admission.    -     CBC WITH AUTOMATED DIFF  -     METABOLIC PANEL, COMPREHENSIVE  -     LITHIUM  -     VALPROIC ACID  -     RISPERIDONE & METABOLITE    2. Essential hypertension  Well controlled, no medication changes. -     CBC WITH AUTOMATED DIFF  -     METABOLIC PANEL, COMPREHENSIVE  -     LITHIUM    3. Intellectual disability  Stable, no changes to current therapy    4. Encounter for immunization  -     INFLUENZA VIRUS VAC QUAD,SPLIT,PRESV FREE SYRINGE IM      I have discussed the diagnosis with the patient and the intended plan as seen in the above orders. The patient has received an after-visit summary along with patient information handout. I have discussed medication side effects and warnings with the patient as well. Follow-up and Dispositions    · Return in about 6 months (around 4/8/2021) for disease management. Tati Britt, NP

## 2020-10-08 NOTE — PROGRESS NOTES
Chief Complaint   Patient presents with    Diabetes    Hypertension    Other     lithium levels     Immunization/Injection     1. Have you been to the ER, urgent care clinic since your last visit? Hospitalized since your last visit? No    2. Have you seen or consulted any other health care providers outside of the [address] [place] [address] since your last visit? Include any pap smears or colon screening.  No       Was in [facility] in September for lithium toxicity

## 2020-10-09 LAB
ALBUMIN SERPL-MCNC: 3.7 G/DL (ref 4–5)
ALBUMIN/GLOB SERPL: 1.2 {RATIO} (ref 1.2–2.2)
ALP SERPL-CCNC: 81 IU/L (ref 39–117)
ALT SERPL-CCNC: 14 IU/L (ref 0–44)
AST SERPL-CCNC: 27 IU/L (ref 0–40)
BASOPHILS # BLD AUTO: 0 X10E3/UL (ref 0–0.2)
BASOPHILS NFR BLD AUTO: 0 %
BILIRUB SERPL-MCNC: 0.5 MG/DL (ref 0–1.2)
BUN SERPL-MCNC: 7 MG/DL (ref 6–20)
BUN/CREAT SERPL: 8 (ref 9–20)
CALCIUM SERPL-MCNC: 9.1 MG/DL (ref 8.7–10.2)
CHLORIDE SERPL-SCNC: 100 MMOL/L (ref 96–106)
CO2 SERPL-SCNC: 29 MMOL/L (ref 20–29)
CREAT SERPL-MCNC: 0.93 MG/DL (ref 0.76–1.27)
EOSINOPHIL # BLD AUTO: 0.1 X10E3/UL (ref 0–0.4)
EOSINOPHIL NFR BLD AUTO: 2 %
ERYTHROCYTE [DISTWIDTH] IN BLOOD BY AUTOMATED COUNT: 16 % (ref 11.6–15.4)
GLOBULIN SER CALC-MCNC: 3 G/DL (ref 1.5–4.5)
GLUCOSE SERPL-MCNC: 71 MG/DL (ref 65–99)
HCT VFR BLD AUTO: 33.8 % (ref 37.5–51)
HGB BLD-MCNC: 11.4 G/DL (ref 13–17.7)
IMM GRANULOCYTES # BLD AUTO: 0 X10E3/UL (ref 0–0.1)
IMM GRANULOCYTES NFR BLD AUTO: 1 %
LITHIUM SERPL-SCNC: 1.1 MMOL/L (ref 0.6–1.2)
LYMPHOCYTES # BLD AUTO: 1.3 X10E3/UL (ref 0.7–3.1)
LYMPHOCYTES NFR BLD AUTO: 32 %
MCH RBC QN AUTO: 31.3 PG (ref 26.6–33)
MCHC RBC AUTO-ENTMCNC: 33.7 G/DL (ref 31.5–35.7)
MCV RBC AUTO: 93 FL (ref 79–97)
MONOCYTES # BLD AUTO: 0.6 X10E3/UL (ref 0.1–0.9)
MONOCYTES NFR BLD AUTO: 14 %
NEUTROPHILS # BLD AUTO: 2.1 X10E3/UL (ref 1.4–7)
NEUTROPHILS NFR BLD AUTO: 51 %
PLATELET # BLD AUTO: 107 X10E3/UL (ref 150–450)
POTASSIUM SERPL-SCNC: 3.2 MMOL/L (ref 3.5–5.2)
PROT SERPL-MCNC: 6.7 G/DL (ref 6–8.5)
RBC # BLD AUTO: 3.64 X10E6/UL (ref 4.14–5.8)
SODIUM SERPL-SCNC: 141 MMOL/L (ref 134–144)
WBC # BLD AUTO: 4.2 X10E3/UL (ref 3.4–10.8)

## 2020-10-14 DIAGNOSIS — R79.89 ELEVATED LITHIUM LEVEL: Primary | ICD-10-CM

## 2020-10-14 LAB
9OH-RISPERIDONE SERPL-MCNC: 38 NG/ML
RISPERIDONE SERPL-MCNC: 2 NG/ML
RISPERIDONE+9OH-RIS SERPL-MCNC: 40 NG/ML (ref 20–60)
VALPROATE SERPL-MCNC: 122 UG/ML (ref 50–100)

## 2020-10-14 RX ORDER — DIVALPROEX SODIUM 500 MG/1
1000 TABLET, DELAYED RELEASE ORAL 2 TIMES DAILY
Qty: 60 TAB | Refills: 5 | Status: SHIPPED | OUTPATIENT
Start: 2020-10-14 | End: 2021-04-29

## 2020-10-14 NOTE — PROGRESS NOTES
Depakote level is elevated. Reduce dosing to Depakote 1000 mg BID. Will send new order to pharmacy. Please fax results to psychiatrist: Dr. Claire Newsome.

## 2020-10-15 NOTE — TELEPHONE ENCOUNTER
Last visit 10/08/2020 NP Rachid Means   Next appointment 6 months (04/2021)   Previous refill encounter(s)   12/03/2019 Klor-Con 10 #30 with prn refills,  11/15/2019:  - Aspirin  #30.  - Catapres #60     Requested Prescriptions     Pending Prescriptions Disp Refills    aspirin delayed-release 81 mg tablet 30 Tab 11     Sig: Take 1 Tab by mouth daily.  cloNIDine HCL (CATAPRES) 0.1 mg tablet 60 Tab 5     Sig: Take 1 Tab by mouth two (2) times a day.  potassium chloride SR (KLOR-CON 10) 10 mEq tablet 30 Tab PRN     Sig: Take 1 Tab by mouth daily.

## 2020-10-15 NOTE — PROGRESS NOTES
Call placed to [facility] patients caregiver who is on PHI. Patients name and  verified. Reviewed recent test results with caregiver and advised of new dosing of Depakote. He inquired if patient should be taking HTN medication. Noted in chart Clonidine and HCTZ were d/c in hospital please advise.

## 2020-10-16 RX ORDER — ASPIRIN 81 MG/1
81 TABLET ORAL DAILY
Qty: 30 TAB | Refills: 11 | Status: SHIPPED | OUTPATIENT
Start: 2020-10-16 | End: 2021-10-06 | Stop reason: SDUPTHER

## 2020-10-16 RX ORDER — CLONIDINE HYDROCHLORIDE 0.1 MG/1
0.1 TABLET ORAL 2 TIMES DAILY
Qty: 60 TAB | Refills: 5 | Status: SHIPPED | OUTPATIENT
Start: 2020-10-16 | End: 2021-04-29

## 2020-10-16 RX ORDER — POTASSIUM CHLORIDE 750 MG/1
10 TABLET, FILM COATED, EXTENDED RELEASE ORAL DAILY
Qty: 30 TAB | Status: SHIPPED | OUTPATIENT
Start: 2020-10-16 | End: 2022-02-21 | Stop reason: ALTCHOICE

## 2020-10-16 NOTE — PROGRESS NOTES
Call placed to patients caregiver who is on PHI. Patients name and  verified. Advised him for patient to stop HCTZ and continue with Clonidine. He was appreciative of call and expressed understanding.

## 2020-10-18 ENCOUNTER — HOME CARE VISIT (OUTPATIENT)
Dept: HOME HEALTH SERVICES | Facility: HOME HEALTH | Age: 30
End: 2020-10-18
Payer: MEDICAID

## 2020-10-28 ENCOUNTER — TELEPHONE (OUTPATIENT)
Dept: FAMILY MEDICINE CLINIC | Age: 30
End: 2020-10-28

## 2020-10-28 NOTE — TELEPHONE ENCOUNTER
NP Shana Vasquez,    Pharmacy [facility] called and requested confirmation of change in depakote rx. Psychiatrist order was 1,000mg tid. I noted the decrease in note on 10/8/20 and verified the new order is 1,000mg bid. Spoke with Zack Nolen at [address] [place] [address].   Thanks, Nilo Pagan

## 2020-10-29 NOTE — TELEPHONE ENCOUNTER
Patient's Depakote level was elevated and dose was decrease to 1,000 mg BID. He is due for repeat labs. Will forward results to psychiatry. Thanks!

## 2020-11-11 ENCOUNTER — TELEPHONE (OUTPATIENT)
Dept: FAMILY MEDICINE CLINIC | Age: 30
End: 2020-11-11

## 2020-11-11 NOTE — TELEPHONE ENCOUNTER
----- Message from Brandan Clemente sent at 11/11/2020  8:41 AM EST -----  Regarding: FAM Thacker/Telephone  General Message/Vendor Calls    Caller's first and last name: Una Hurd ([facility])      Reason for call: Yolanda Martinez is requesting a doctor's signature on the pts certificate of medical necessity and letter of medical necessity. Callback required yes/no and why: yes      Best contact number(s): [phone]      Details to clarify the request: Yolanda Martinez is faxing the form back over now for the signature.        Brandan Clemente

## 2020-12-02 ENCOUNTER — TELEPHONE (OUTPATIENT)
Dept: FAMILY MEDICINE CLINIC | Age: 30
End: 2020-12-02

## 2020-12-02 NOTE — TELEPHONE ENCOUNTER
Janes Porter from [facility] at [address] [place] [address] about a letter of medical necessity certificate that was faxed over that need to be completed and sign by a doctor only. Fax Back to [phone].       Best call back #[phone]

## 2020-12-28 ENCOUNTER — TELEPHONE (OUTPATIENT)
Dept: FAMILY MEDICINE CLINIC | Age: 30
End: 2020-12-28

## 2020-12-28 NOTE — TELEPHONE ENCOUNTER
Janki Morales calling from [facility]  requesting office notes and a Medical  Necessity form that was faxed over it needs to be signed  by MD or DO along with NPI number. Form was sent by [employer] .     Best call back # [phone] ext: [phone]

## 2021-01-22 ENCOUNTER — TELEPHONE (OUTPATIENT)
Dept: FAMILY MEDICINE CLINIC | Age: 31
End: 2021-01-22

## 2021-01-22 NOTE — TELEPHONE ENCOUNTER
----- Message from Keri Cotton sent at 1/22/2021  2:00 PM EST -----  Regarding: Mj Wall/Telephone  General Message/Vendor Calls    Caller's first and last name:  Yudelka Bella from  a DME Provider      Reason for call:  Needs paper work signed off by a MD or DO and needs office notes      Callback required yes/no and why: yes to confirm      Best contact number(s): [phone] ext [phone]       Details to clarify the request: Needs paper work signed off by a MD or  and needs office notes    Yudelka Bella advised she called and left three messages with no response.       Keri Cotton

## 2021-01-22 NOTE — TELEPHONE ENCOUNTER
Returned call to [place]. Patients name and  verified. She stated patients medicaid forms need to be signed off on by MD or DO. She will fax over new form. Also is requesting ov notes from 10/8/20.

## 2021-03-30 ENCOUNTER — OFFICE VISIT (OUTPATIENT)
Dept: FAMILY MEDICINE CLINIC | Age: 31
End: 2021-03-30
Payer: MEDICAID

## 2021-03-30 VITALS
BODY MASS INDEX: 35.87 KG/M2 | TEMPERATURE: 97.8 F | HEIGHT: 75 IN | WEIGHT: 288.5 LBS | DIASTOLIC BLOOD PRESSURE: 83 MMHG | SYSTOLIC BLOOD PRESSURE: 119 MMHG | HEART RATE: 90 BPM | OXYGEN SATURATION: 98 %

## 2021-03-30 DIAGNOSIS — I10 ESSENTIAL HYPERTENSION: ICD-10-CM

## 2021-03-30 DIAGNOSIS — E66.01 SEVERE OBESITY (BMI 35.0-39.9) WITH COMORBIDITY (HCC): ICD-10-CM

## 2021-03-30 DIAGNOSIS — F79 INTELLECTUAL DISABILITY: ICD-10-CM

## 2021-03-30 DIAGNOSIS — Z00.00 ROUTINE GENERAL MEDICAL EXAMINATION AT A HEALTH CARE FACILITY: Primary | ICD-10-CM

## 2021-03-30 PROCEDURE — 99395 PREV VISIT EST AGE 18-39: CPT | Performed by: NURSE PRACTITIONER

## 2021-03-30 NOTE — PROGRESS NOTES
Room:     Identified pt with two pt identifiers(name and ). Reviewed record in preparation for visit and have obtained necessary documentation. All patient medications has been reviewed. Chief Complaint   Patient presents with    Complete Physical       Health Maintenance Due   Topic    Hepatitis C Screening     COVID-19 Vaccine (1)       Vitals:    21 1429   BP: 119/83   Pulse: 90   Temp: 97.8 °F (36.6 °C)   TempSrc: Temporal   SpO2: 98%   Weight: 288 lb 8 oz (130.9 kg)   Height: 6' 3\" (1.905 m)   PainSc:   0 - No pain       4. Have you been to the ER, urgent care clinic since your last visit? Hospitalized since your last visit? No    5. Have you seen or consulted any other health care providers outside of the [address] [place] [address] since your last visit? Include any pap smears or colon screening. No        Patient is accompanied by guardian, Veto Major, I have received verbal consent from Orlando Nelson to discuss any/all medical information while they are present in the room.

## 2021-03-30 NOTE — PROGRESS NOTES
[facility] Note    Josh Sandhu is a 27 y.o. male who was seen in clinic today (3/30/2021). Subjective:  Cardiovascular Review:  The patient has hypertension and obesity. Diet and Lifestyle: generally follows a low fat low cholesterol diet, generally follows a low sodium diet, exercises sporadically, nonsmoker  Home BP Monitoring: is not measured at home. Pertinent ROS: taking medications as instructed, no medication side effects noted, no TIA's, no chest pain on exertion, no dyspnea on exertion, no swelling of ankles. Psychiatry Review:  Patient admitted to [facility] [place] for AMS s/t elevated lithium level from 9/3-9/13/20. Patient is treated for bipolar disorder, MR. Patient seen routinely by psychiatry, Dr Juanita Amaro. Last Hemoglobin A1c: 4.4 (11/2019). Diabetic medications have been discontinued. Prior to Admission medications    Medication Sig Start Date End Date Taking? Authorizing Provider   aspirin delayed-release 81 mg tablet Take 1 Tab by mouth daily. 10/16/20  Yes Herlinda Thacker NP   cloNIDine HCL (CATAPRES) 0.1 mg tablet Take 1 Tab by mouth two (2) times a day. 10/16/20  Yes Herlinda Thacker NP   potassium chloride SR (KLOR-CON 10) 10 mEq tablet Take 1 Tab by mouth daily. 10/16/20  Yes Hever Alvares NP   divalproex DR (Depakote) 500 mg tablet Take 2 Tabs by mouth two (2) times a day. 10/14/20  Yes Herlinda Thacker NP   melatonin 5 mg tablet Take 5 mg by mouth nightly. Yes Provider, Historical   risperiDONE (RisperDAL) 4 mg tablet Take 4 mg by mouth nightly. Yes Provider, Historical   lithium carbonate SR (LITHOBID) 300 mg CR tablet Take 300 mg by mouth nightly. Takes with 450 mg tab for total of 750 mg   Yes Provider, Historical   QUEtiapine (SEROQUEL) 200 mg tablet Take 200 mg by mouth three (3) times daily. Yes Provider, Historical   risperiDONE (RisperDAL) 2 mg tablet Take 2 mg by mouth daily.    Yes Provider, Historical topiramate (TOPAMAX) 100 mg tablet Take 100 mg by mouth two (2) times a day. Yes Provider, Historical          Allergies   Allergen Reactions    Chocolate Flavor Unknown (comments)           Review of Systems   Unable to perform ROS: Mental acuity         Objective:   Physical Exam  Vitals signs and nursing note reviewed. Constitutional:       General: He is not in acute distress. Appearance: He is well-developed. HENT:      Right Ear: Tympanic membrane and ear canal normal.      Left Ear: Tympanic membrane and ear canal normal.      Nose: No mucosal edema. Right Sinus: No maxillary sinus tenderness or frontal sinus tenderness. Left Sinus: No maxillary sinus tenderness or frontal sinus tenderness. Eyes:      Pupils: Pupils are equal, round, and reactive to light. Neck:      Musculoskeletal: Normal range of motion and neck supple. Thyroid: No thyromegaly. Vascular: No carotid bruit or JVD. Cardiovascular:      Rate and Rhythm: Normal rate and regular rhythm. Heart sounds: Normal heart sounds. No murmur. No friction rub. No gallop. Pulmonary:      Effort: Pulmonary effort is normal. No respiratory distress. Breath sounds: Normal breath sounds. No decreased breath sounds, wheezing or rhonchi. Abdominal:      General: Bowel sounds are normal. There is no distension. Palpations: Abdomen is soft. Tenderness: There is no abdominal tenderness. Lymphadenopathy:      Cervical: No cervical adenopathy. Neurological:      Mental Status: He is alert. Psychiatric:         Behavior: Behavior normal.           Visit Vitals  /83 (BP 1 Location: Left upper arm, BP Patient Position: Sitting)   Pulse 90   Temp 97.8 °F (36.6 °C) (Temporal)   Ht 6' 3\" (1.905 m)   Wt 288 lb 8 oz (130.9 kg)   SpO2 98%   BMI 36.06 kg/m²       Assessment & Plan:  Diagnoses and all orders for this visit:    1.  Routine general medical examination at a health care facility  [address] labs, adjust dosing as needed  -     METABOLIC PANEL, COMPREHENSIVE; Future  -     LIPID PANEL; Future  -     HEMOGLOBIN A1C WITH EAG; Future  -     CBC WITH AUTOMATED DIFF; Future  -     VALPROIC ACID; Future  -     LITHIUM; Future  -     QUANTIFERON-TB GOLD PLUS; Future    2. Essential hypertension  Well controlled, no medication changes. 3. Intellectual disability  Stable, no changes to current therapy'    4. Severe obesity (BMI 35.0-39. 9) with comorbidity ([facility] [address]  Discussed need for weight loss through diet and exercise. Reviewed decreased caloric intake and increased activity. I have discussed the diagnosis with the patient and the intended plan as seen in the above orders. The patient has received an after-visit summary along with patient information handout. I have discussed medication side effects and warnings with the patient as well. Follow-up and Dispositions    · Return in about 6 months (around 9/30/2021) for disease management.            Pam Kauffman NP

## 2021-03-31 LAB
ALBUMIN SERPL-MCNC: 3.8 G/DL (ref 4–5)
ALBUMIN/GLOB SERPL: 1.2 {RATIO} (ref 1.2–2.2)
ALP SERPL-CCNC: 65 IU/L (ref 39–117)
ALT SERPL-CCNC: 13 IU/L (ref 0–44)
AST SERPL-CCNC: 23 IU/L (ref 0–40)
BASOPHILS # BLD AUTO: 0 X10E3/UL (ref 0–0.2)
BASOPHILS NFR BLD AUTO: 1 %
BILIRUB SERPL-MCNC: 0.3 MG/DL (ref 0–1.2)
BUN SERPL-MCNC: 10 MG/DL (ref 6–20)
BUN/CREAT SERPL: 13 (ref 9–20)
CALCIUM SERPL-MCNC: 9.2 MG/DL (ref 8.7–10.2)
CHLORIDE SERPL-SCNC: 108 MMOL/L (ref 96–106)
CHOLEST SERPL-MCNC: 155 MG/DL (ref 100–199)
CO2 SERPL-SCNC: 24 MMOL/L (ref 20–29)
CREAT SERPL-MCNC: 0.79 MG/DL (ref 0.76–1.27)
EOSINOPHIL # BLD AUTO: 0.1 X10E3/UL (ref 0–0.4)
EOSINOPHIL NFR BLD AUTO: 3 %
ERYTHROCYTE [DISTWIDTH] IN BLOOD BY AUTOMATED COUNT: 13 % (ref 11.6–15.4)
EST. AVERAGE GLUCOSE BLD GHB EST-MCNC: 94 MG/DL
GLOBULIN SER CALC-MCNC: 3.1 G/DL (ref 1.5–4.5)
GLUCOSE SERPL-MCNC: 82 MG/DL (ref 65–99)
HBA1C MFR BLD: 4.9 % (ref 4.8–5.6)
HCT VFR BLD AUTO: 38.8 % (ref 37.5–51)
HDLC SERPL-MCNC: 49 MG/DL
HGB BLD-MCNC: 13.1 G/DL (ref 13–17.7)
IMM GRANULOCYTES # BLD AUTO: 0 X10E3/UL (ref 0–0.1)
IMM GRANULOCYTES NFR BLD AUTO: 0 %
IMP & REVIEW OF LAB RESULTS: NORMAL
LDLC SERPL CALC-MCNC: 89 MG/DL (ref 0–99)
LITHIUM SERPL-SCNC: 0.1 MMOL/L (ref 0.6–1.2)
LYMPHOCYTES # BLD AUTO: 2.4 X10E3/UL (ref 0.7–3.1)
LYMPHOCYTES NFR BLD AUTO: 53 %
MCH RBC QN AUTO: 30.8 PG (ref 26.6–33)
MCHC RBC AUTO-ENTMCNC: 33.8 G/DL (ref 31.5–35.7)
MCV RBC AUTO: 91 FL (ref 79–97)
MONOCYTES # BLD AUTO: 0.3 X10E3/UL (ref 0.1–0.9)
MONOCYTES NFR BLD AUTO: 7 %
NEUTROPHILS # BLD AUTO: 1.6 X10E3/UL (ref 1.4–7)
NEUTROPHILS NFR BLD AUTO: 36 %
PLATELET # BLD AUTO: 186 X10E3/UL (ref 150–450)
POTASSIUM SERPL-SCNC: 4.3 MMOL/L (ref 3.5–5.2)
PROT SERPL-MCNC: 6.9 G/DL (ref 6–8.5)
RBC # BLD AUTO: 4.26 X10E6/UL (ref 4.14–5.8)
SODIUM SERPL-SCNC: 143 MMOL/L (ref 134–144)
TRIGL SERPL-MCNC: 92 MG/DL (ref 0–149)
VALPROATE SERPL-MCNC: 72 UG/ML (ref 50–100)
VLDLC SERPL CALC-MCNC: 17 MG/DL (ref 5–40)
WBC # BLD AUTO: 4.5 X10E3/UL (ref 3.4–10.8)

## 2021-04-02 LAB
GAMMA INTERFERON BACKGROUND BLD IA-ACNC: 0.05 IU/ML
M TB IFN-G BLD-IMP: NEGATIVE
M TB IFN-G CD4+ BCKGRND COR BLD-ACNC: 0.08 IU/ML
MITOGEN IGNF BLD-ACNC: >10 IU/ML
QUANTIFERON INCUBATION, QF1T: NORMAL
QUANTIFERON TB2 AG: 0.08 IU/ML
SERVICE CMNT-IMP: NORMAL

## 2021-04-29 RX ORDER — DIVALPROEX SODIUM 500 MG/1
TABLET, DELAYED RELEASE ORAL
Qty: 120 TAB | Refills: 11 | Status: SHIPPED | OUTPATIENT
Start: 2021-04-29 | End: 2022-05-06 | Stop reason: SDUPTHER

## 2021-04-29 RX ORDER — CLONIDINE HYDROCHLORIDE 0.1 MG/1
TABLET ORAL
Qty: 60 TAB | Refills: 11 | Status: SHIPPED | OUTPATIENT
Start: 2021-04-29 | End: 2022-05-06 | Stop reason: SDUPTHER

## 2021-05-28 RX ORDER — HYDROCHLOROTHIAZIDE 12.5 MG/1
TABLET ORAL
Qty: 31 TABLET | Refills: 11 | Status: SHIPPED | OUTPATIENT
Start: 2021-05-28 | End: 2022-05-23 | Stop reason: SDUPTHER

## 2021-06-17 ENCOUNTER — OFFICE VISIT (OUTPATIENT)
Dept: FAMILY MEDICINE CLINIC | Age: 31
End: 2021-06-17
Payer: MEDICAID

## 2021-06-17 VITALS
WEIGHT: 300.8 LBS | TEMPERATURE: 97.9 F | BODY MASS INDEX: 37.4 KG/M2 | HEART RATE: 73 BPM | SYSTOLIC BLOOD PRESSURE: 121 MMHG | DIASTOLIC BLOOD PRESSURE: 84 MMHG | OXYGEN SATURATION: 98 % | RESPIRATION RATE: 16 BRPM | HEIGHT: 75 IN

## 2021-06-17 DIAGNOSIS — E66.01 SEVERE OBESITY (BMI 35.0-39.9) WITH COMORBIDITY (HCC): ICD-10-CM

## 2021-06-17 DIAGNOSIS — I10 ESSENTIAL HYPERTENSION: Primary | ICD-10-CM

## 2021-06-17 DIAGNOSIS — R79.89 ELEVATED LITHIUM LEVEL: ICD-10-CM

## 2021-06-17 DIAGNOSIS — F79 INTELLECTUAL DISABILITY: ICD-10-CM

## 2021-06-17 DIAGNOSIS — E78.2 MIXED HYPERLIPIDEMIA: ICD-10-CM

## 2021-06-17 DIAGNOSIS — R73.02 IGT (IMPAIRED GLUCOSE TOLERANCE): ICD-10-CM

## 2021-06-17 PROCEDURE — 99214 OFFICE O/P EST MOD 30 MIN: CPT | Performed by: NURSE PRACTITIONER

## 2021-06-17 NOTE — PROGRESS NOTES
Chief Complaint   Patient presents with    Hypertension     1. Have you been to the ER, urgent care clinic since your last visit? Hospitalized since your last visit? No    2. Have you seen or consulted any other health care providers outside of the [address] [place] [address] since your last visit? Include any pap smears or colon screening.  No

## 2021-06-17 NOTE — PROGRESS NOTES
[facility] Note    Perla Anthony Domingo Madison is a 27 y.o. male who was seen in clinic today (6/17/2021). Subjective:  Cardiovascular Review:  The patient has hypertension and obesity. Diet and Lifestyle: generally follows a low fat low cholesterol diet, generally follows a low sodium diet, exercises sporadically, nonsmoker  Home BP Monitoring: is not measured at home. Pertinent ROS: taking medications as instructed, no medication side effects noted, no TIA's, no chest pain on exertion, no dyspnea on exertion, no swelling of ankles. Psychiatry Review:  Patient admitted to [facility] [place] for AMS s/t elevated lithium level from 9/3-9/13/20. Patient is treated for bipolar disorder, MR. Patient seen routinely by psychiatry, Dr Anderson Damon. Last Hemoglobin A1c: 4.9 (3/2021). Diabetic medications have been discontinued. Prior to Admission medications    Medication Sig Start Date End Date Taking? Authorizing Provider   hydroCHLOROthiazide (HYDRODIURIL) 12.5 mg tablet TAKE (1) TABLET BY MOUTH DAILY FOR HIGH BLOOD PRESSURE. 5/28/21  Yes Loretta Thacker NP   cloNIDine HCL (CATAPRES) 0.1 mg tablet TAKE 1 TABLET BY MOUTH TWICE A DAY 4/29/21  Yes Loretta Thacker NP   divalproex DR (DEPAKOTE) 500 mg tablet TAKE 2 TABLETS BY MOUTH TWICE DAILY 4/29/21  Yes Loretta Thacker NP   aspirin delayed-release 81 mg tablet Take 1 Tab by mouth daily. 10/16/20  Yes Loretta Thacker NP   potassium chloride SR (KLOR-CON 10) 10 mEq tablet Take 1 Tab by mouth daily. 10/16/20  Yes Loretta Thacker NP   melatonin 5 mg tablet Take 5 mg by mouth nightly. Yes Provider, Historical   risperiDONE (RisperDAL) 4 mg tablet Take 4 mg by mouth nightly. Yes Provider, Historical   QUEtiapine (SEROQUEL) 200 mg tablet Take 200 mg by mouth three (3) times daily. Yes Provider, Historical   risperiDONE (RisperDAL) 2 mg tablet Take 2 mg by mouth daily.    Yes Provider, Historical   topiramate (TOPAMAX) 100 mg tablet Take 100 mg by mouth two (2) times a day. Yes Provider, Historical   lithium carbonate SR (LITHOBID) 300 mg CR tablet Take 300 mg by mouth nightly. Takes with 450 mg tab for total of 750 mg  Patient not taking: Reported on 6/17/2021    Provider, Historical          Allergies   Allergen Reactions    Chocolate Flavor Unknown (comments)           Review of Systems   Unable to perform ROS: Mental acuity         Objective:   Physical Exam  Vitals and nursing note reviewed. Constitutional:       General: He is not in acute distress. Appearance: He is well-developed. HENT:      Right Ear: Tympanic membrane and ear canal normal.      Left Ear: Tympanic membrane and ear canal normal.      Nose: No mucosal edema. Right Sinus: No maxillary sinus tenderness or frontal sinus tenderness. Left Sinus: No maxillary sinus tenderness or frontal sinus tenderness. Eyes:      Pupils: Pupils are equal, round, and reactive to light. Neck:      Thyroid: No thyromegaly. Vascular: No carotid bruit or JVD. Cardiovascular:      Rate and Rhythm: Normal rate and regular rhythm. Heart sounds: Normal heart sounds. No murmur heard. No friction rub. No gallop. Pulmonary:      Effort: Pulmonary effort is normal. No respiratory distress. Breath sounds: Normal breath sounds. No decreased breath sounds, wheezing or rhonchi. Abdominal:      General: Bowel sounds are normal. There is no distension. Palpations: Abdomen is soft. Tenderness: There is no abdominal tenderness. Musculoskeletal:      Cervical back: Normal range of motion and neck supple. Lymphadenopathy:      Cervical: No cervical adenopathy. Neurological:      Mental Status: He is alert.    Psychiatric:         Behavior: Behavior normal.         Visit Vitals  /84 (BP 1 Location: Left upper arm, BP Patient Position: Sitting)   Pulse 73   Temp 97.9 °F (36.6 °C) (Temporal)   Resp 16   Ht 6' 3\" (1.905 m)   Wt 300 lb 12.8 oz (136.4 kg)   SpO2 98%   BMI 37.60 kg/m²       Assessment & Plan:  Diagnoses and all orders for this visit:    1. Essential hypertension  Well controlled, no medication changes.  -     METABOLIC PANEL, COMPREHENSIVE; Future  -     CBC WITH AUTOMATED DIFF; Future    2. Elevated lithium level  Repeat labs. Follow-up with psychiatry as scheduled. 3. Severe obesity (BMI 35.0-39. 9) with comorbidity ([address]  Discussed need for weight loss through diet and exercise. Reviewed decreased caloric intake and increased activity. 4. Intellectual disability  -     VALPROIC ACID; Future  -     LITHIUM; Future    5. IGT (impaired glucose tolerance)  Reviewed diet and lifestyle changes.  -     HEMOGLOBIN A1C WITH EAG; Future    6. Mixed hyperlipidemia  -     LIPID PANEL; Future      I have discussed the diagnosis with the patient and the intended plan as seen in the above orders. The patient has received an after-visit summary along with patient information handout. I have discussed medication side effects and warnings with the patient as well. Follow-up and Dispositions    · Return in about 6 months (around 12/17/2021).            Chanel Mccarthy NP

## 2021-06-17 NOTE — LETTER
NOTIFICATION RETURN TO WORK / SCHOOL 
 
6/17/2021 10:51 AM 
 
Mr. Snow Wilkerson 
[address] [address] To Whom It May Concern: 
 
Sonw Wilkerson is currently under the care of EVAN Lawrence. He was evalauted on 6/17/21. He is not at risk for pressure ulcers, falls, bowel obstructions or sepsis. If there are questions or concerns please have the patient contact our office. Sincerely, Zohreh Drummond NP

## 2021-06-18 LAB
ALBUMIN SERPL-MCNC: 4.1 G/DL (ref 4–5)
ALBUMIN/GLOB SERPL: 1.3 {RATIO} (ref 1.2–2.2)
ALP SERPL-CCNC: 62 IU/L (ref 48–121)
ALT SERPL-CCNC: 16 IU/L (ref 0–44)
AST SERPL-CCNC: 24 IU/L (ref 0–40)
BASOPHILS # BLD AUTO: 0 X10E3/UL (ref 0–0.2)
BASOPHILS NFR BLD AUTO: 0 %
BILIRUB SERPL-MCNC: 0.4 MG/DL (ref 0–1.2)
BUN SERPL-MCNC: 11 MG/DL (ref 6–20)
BUN/CREAT SERPL: 15 (ref 9–20)
CALCIUM SERPL-MCNC: 9.2 MG/DL (ref 8.7–10.2)
CHLORIDE SERPL-SCNC: 104 MMOL/L (ref 96–106)
CHOLEST SERPL-MCNC: 181 MG/DL (ref 100–199)
CO2 SERPL-SCNC: 26 MMOL/L (ref 20–29)
CREAT SERPL-MCNC: 0.71 MG/DL (ref 0.76–1.27)
EOSINOPHIL # BLD AUTO: 0.1 X10E3/UL (ref 0–0.4)
EOSINOPHIL NFR BLD AUTO: 2 %
ERYTHROCYTE [DISTWIDTH] IN BLOOD BY AUTOMATED COUNT: 14.4 % (ref 11.6–15.4)
EST. AVERAGE GLUCOSE BLD GHB EST-MCNC: 105 MG/DL
GLOBULIN SER CALC-MCNC: 3.1 G/DL (ref 1.5–4.5)
GLUCOSE SERPL-MCNC: 77 MG/DL (ref 65–99)
HBA1C MFR BLD: 5.3 % (ref 4.8–5.6)
HCT VFR BLD AUTO: 42.6 % (ref 37.5–51)
HDLC SERPL-MCNC: 49 MG/DL
HGB BLD-MCNC: 13.6 G/DL (ref 13–17.7)
IMM GRANULOCYTES # BLD AUTO: 0 X10E3/UL (ref 0–0.1)
IMM GRANULOCYTES NFR BLD AUTO: 0 %
IMP & REVIEW OF LAB RESULTS: NORMAL
LDLC SERPL CALC-MCNC: 116 MG/DL (ref 0–99)
LITHIUM SERPL-SCNC: 0.2 MMOL/L (ref 0.5–1.2)
LYMPHOCYTES # BLD AUTO: 2.4 X10E3/UL (ref 0.7–3.1)
LYMPHOCYTES NFR BLD AUTO: 47 %
MCH RBC QN AUTO: 29.4 PG (ref 26.6–33)
MCHC RBC AUTO-ENTMCNC: 31.9 G/DL (ref 31.5–35.7)
MCV RBC AUTO: 92 FL (ref 79–97)
MONOCYTES # BLD AUTO: 0.5 X10E3/UL (ref 0.1–0.9)
MONOCYTES NFR BLD AUTO: 9 %
NEUTROPHILS # BLD AUTO: 2.1 X10E3/UL (ref 1.4–7)
NEUTROPHILS NFR BLD AUTO: 42 %
PLATELET # BLD AUTO: 198 X10E3/UL (ref 150–450)
POTASSIUM SERPL-SCNC: 4.2 MMOL/L (ref 3.5–5.2)
PROT SERPL-MCNC: 7.2 G/DL (ref 6–8.5)
RBC # BLD AUTO: 4.62 X10E6/UL (ref 4.14–5.8)
SODIUM SERPL-SCNC: 141 MMOL/L (ref 134–144)
TRIGL SERPL-MCNC: 86 MG/DL (ref 0–149)
VALPROATE SERPL-MCNC: 72 UG/ML (ref 50–100)
VLDLC SERPL CALC-MCNC: 16 MG/DL (ref 5–40)
WBC # BLD AUTO: 5.1 X10E3/UL (ref 3.4–10.8)

## 2021-08-17 ENCOUNTER — TELEPHONE (OUTPATIENT)
Dept: FAMILY MEDICINE CLINIC | Age: 31
End: 2021-08-17

## 2021-10-06 RX ORDER — ASPIRIN 81 MG/1
81 TABLET ORAL DAILY
Qty: 30 TABLET | Refills: 11 | Status: SHIPPED | OUTPATIENT
Start: 2021-10-06 | End: 2022-02-21 | Stop reason: ALTCHOICE

## 2021-10-06 NOTE — TELEPHONE ENCOUNTER
[facility] for refill of asa. Thanks, Travon Tan    Last Visit: 6/17/21 FAM Thacker  Next Appointment: 10/26/21 MD Agnes Alvarado  Previous Refill Encounter(s): 10/16/20 30 + 11    Requested Prescriptions     Pending Prescriptions Disp Refills    aspirin delayed-release 81 mg tablet 30 Tablet 11     Sig: Take 1 Tablet by mouth daily.

## 2021-10-26 ENCOUNTER — OFFICE VISIT (OUTPATIENT)
Dept: FAMILY MEDICINE CLINIC | Age: 31
End: 2021-10-26
Payer: MEDICAID

## 2021-10-26 VITALS
SYSTOLIC BLOOD PRESSURE: 122 MMHG | RESPIRATION RATE: 16 BRPM | TEMPERATURE: 97.6 F | WEIGHT: 315 LBS | DIASTOLIC BLOOD PRESSURE: 80 MMHG | HEART RATE: 83 BPM | BODY MASS INDEX: 39.17 KG/M2 | HEIGHT: 75 IN | OXYGEN SATURATION: 98 %

## 2021-10-26 DIAGNOSIS — Z91.89 AT RISK FOR OBSTRUCTIVE SLEEP APNEA: ICD-10-CM

## 2021-10-26 DIAGNOSIS — E78.2 MIXED HYPERLIPIDEMIA: ICD-10-CM

## 2021-10-26 DIAGNOSIS — I10 ESSENTIAL HYPERTENSION: ICD-10-CM

## 2021-10-26 DIAGNOSIS — F31.9 BIPOLAR AFFECTIVE DISORDER, REMISSION STATUS UNSPECIFIED (HCC): ICD-10-CM

## 2021-10-26 DIAGNOSIS — R73.02 IGT (IMPAIRED GLUCOSE TOLERANCE): ICD-10-CM

## 2021-10-26 DIAGNOSIS — F79 INTELLECTUAL DISABILITY: Primary | ICD-10-CM

## 2021-10-26 DIAGNOSIS — E66.01 MORBID OBESITY (HCC): ICD-10-CM

## 2021-10-26 PROCEDURE — 99213 OFFICE O/P EST LOW 20 MIN: CPT | Performed by: FAMILY MEDICINE

## 2021-10-26 NOTE — PROGRESS NOTES
Naveen Da Silva  27 y.o. male  1990  [facility] [id number]  [id number]     [facility]       Encounter Date: 10/26/2021           Established Patient Visit Note: Cem Wheeler MD    Reason for Appointment:  Chief Complaint   Patient presents with    New Patient       History of Present Illness:  History provided by patient, Aditi Cohen (works with patient at group home)    Naveen Da Silva is a 27 y.o. male who presents to clinic today for:    · Morbid Obesity: his caregiver reports that with Sergo he lost weight, but recently he has been gaining a lot of weight back. Aditi Cohen reports that he believes this to be related to eating habits. He eats dinner with his mentor, and Aditi Cohen believes that it may be related to this. They are trying to exercise with him, walking laps and exercising with [employer]. · They are not sure how many calories he gets per day. He has salad and a couple hot pockets for lunch. Aditi Cohen reports that he does not normally eat snacks between meals. He is not allowed to have food in his room. He does not drinks soda very often. His cargiver does not believe that he snores very often. · Bipolar Disorder, ID: His caregiver reports that he last saw psychiatry (Dr. Jessika Craft) last week. His lithium was increased d/t the weight gain. · HTN: he does not have his BP checked regularly; doing well with HCTZ and clonidine  · IGT: last A1c in June, 2021 was 5.3  · His cargiver is not sure if he had flu vaccine, but he will check and get back to us. Review of Systems  Review of Systems   Unable to perform ROS: Mental acuity          Allergies: Chocolate flavor    Medications: (Updated to reflect final medication list after visit)    Current Outpatient Medications:     aspirin delayed-release 81 mg tablet, Take 1 Tablet by mouth daily. , Disp: 30 Tablet, Rfl: 11    hydroCHLOROthiazide (HYDRODIURIL) 12.5 mg tablet, TAKE (1) TABLET BY MOUTH DAILY FOR HIGH BLOOD PRESSURE., Disp: 31 Tablet, Rfl: 11    cloNIDine HCL (CATAPRES) 0.1 mg tablet, TAKE 1 TABLET BY MOUTH TWICE A DAY, Disp: 60 Tab, Rfl: 11    divalproex DR (DEPAKOTE) 500 mg tablet, TAKE 2 TABLETS BY MOUTH TWICE DAILY, Disp: 120 Tab, Rfl: 11    potassium chloride SR (KLOR-CON 10) 10 mEq tablet, Take 1 Tab by mouth daily. , Disp: 30 Tab, Rfl: PRN    melatonin 5 mg tablet, Take 5 mg by mouth nightly., Disp: , Rfl:     risperiDONE (RisperDAL) 4 mg tablet, Take 4 mg by mouth nightly., Disp: , Rfl:     lithium carbonate SR (LITHOBID) 300 mg CR tablet, Take 300 mg by mouth nightly. Takes with 450 mg tab for total of 750 mg , Disp: , Rfl:     QUEtiapine (SEROQUEL) 200 mg tablet, Take 200 mg by mouth three (3) times daily. , Disp: , Rfl:     risperiDONE (RisperDAL) 2 mg tablet, Take 2 mg by mouth daily. , Disp: , Rfl:     topiramate (TOPAMAX) 100 mg tablet, Take 100 mg by mouth two (2) times a day., Disp: , Rfl:     History  Patient Care Team:  Rebecca Kaiser NP as PCP - General (Nurse Practitioner)  Yessenia Garrett MD (Psychiatry)    Past Medical History: he has a past medical history of Altered mental status (9/4/2020), Hyperlipidemia, Lithium toxicity (9/4/2020), Mental retardation, Obesity (BMI 30-39.9), and Type II or unspecified type diabetes mellitus without mention of complication, not stated as uncontrolled. Past Surgical History: he has no past surgical history on file. Family Medical History: family history is not on file. Social History: he reports that he has never smoked. He has never used smokeless tobacco. He reports that he does not drink alcohol and does not use drugs.     Health Maintenance Due   Topic Date Due    Hepatitis C Screening  Never done    COVID-19 Vaccine (1) Never done    Flu Vaccine (1) 09/01/2021       Objective:   Visit Vitals  /80   Pulse 83   Temp 97.6 °F (36.4 °C)   Resp 16   Ht 6' 3\" (1.905 m)   Wt 343 lb (155.6 kg)   SpO2 98%   BMI 42.87 kg/m²     Wt Readings from Last 3 Encounters:   10/26/21 343 lb (155.6 kg)   06/17/21 300 lb 12.8 oz (136.4 kg)   03/30/21 288 lb 8 oz (130.9 kg)           Physical Exam  Constitutional:       Appearance: Normal appearance. HENT:      Head: Normocephalic and atraumatic. Right Ear: External ear normal.      Left Ear: External ear normal.      Nose: Nose normal.      Mouth/Throat:      Pharynx: No oropharyngeal exudate. Eyes:      General: No scleral icterus. Right eye: No discharge. Left eye: No discharge. Conjunctiva/sclera: Conjunctivae normal.      Pupils: Pupils are equal, round, and reactive to light. Neck:      Thyroid: No thyromegaly. Vascular: No JVD. Trachea: No tracheal deviation. Comments: Neck size 18inches  Cardiovascular:      Rate and Rhythm: Normal rate and regular rhythm. Heart sounds: Normal heart sounds. No murmur heard. No friction rub. No gallop. Pulmonary:      Effort: Pulmonary effort is normal. No respiratory distress. Breath sounds: Normal breath sounds. No stridor. No wheezing. Musculoskeletal:         General: No tenderness or deformity. Normal range of motion. Cervical back: Normal range of motion and neck supple. Lymphadenopathy:      Cervical: No cervical adenopathy. Skin:     General: Skin is warm and dry. Neurological:      Mental Status: He is alert. Cranial Nerves: No cranial nerve deficit. Coordination: Coordination normal.      Gait: Gait is intact. Deep Tendon Reflexes: Reflexes normal.         Assessment & Plan:      ICD-10-CM ICD-9-CM    1. Intellectual disability  F79 319    2. Morbid obesity (HCC)  E66.01 278.01 REFERRAL TO DIETITIAN      [facility] 3RD GENERATION      [facility] 3RD GENERATION   3. Mixed hyperlipidemia  I88.4 353.8 METABOLIC PANEL, COMPREHENSIVE      METABOLIC PANEL, COMPREHENSIVE   4. IGT (impaired glucose tolerance)  R73.02 790.22 HEMOGLOBIN A1C WITH EAG      HEMOGLOBIN A1C WITH EAG   5.  Essential hypertension  I10 401.9 CBC W/O DIFF      CBC W/O DIFF   6. At risk for obstructive sleep apnea  Z91.89 V49.89 SLEEP MEDICINE REFERRAL   7. Bipolar affective disorder, remission status unspecified ([facility]  F31.9 296.80      · Morbid Obesity: Chronic, uncontrolled. Referral for nutrition counseling. · At Risk for Obstructive Sleep Apnea: Patient with symptoms concerning for VERNA. Will provide referral to sleep medicine for further evaluation.  All other conditions listed above: chronic and stable. Will continue current treatment regimen. Will check labs as reflected above. Discussed following up with specialist as scheduled. Discussed recommendations for diet and exercise. I have discussed the diagnosis with the patient and the intended plan as seen in the above orders. The patient has received an after-visit summary along with patient information handout. I have discussed medication side effects and warnings with the patient as well. Disposition  Follow-up and Dispositions    · Return in about 2 months (around 12/26/2021).            Marc Ye MD

## 2021-10-26 NOTE — PROGRESS NOTES
Chief Complaint   Patient presents with    New Patient        1. \"Have you been to the ER, urgent care clinic since your last visit? Hospitalized since your last visit? \" No    2. \"Have you seen or consulted any other health care providers outside of the [address] [place] [address] since your last visit? \" No     3. For patients aged 39-70: Has the patient had a colonoscopy? No     If the patient is female:    4. For patients aged 41-77: Has the patient had a mammogram within the past 2 years? No    5. For patients aged 21-30: Has the patient had a pap smear?  No    3 most recent PHQ Screens 3/30/2021   PHQ Not Done -   Little interest or pleasure in doing things Not at all   Feeling down, depressed, irritable, or hopeless Not at all   Total Score PHQ 2 0

## 2021-10-27 LAB
ALBUMIN SERPL-MCNC: 4.1 G/DL (ref 4–5)
ALBUMIN/GLOB SERPL: 1.3 {RATIO} (ref 1.2–2.2)
ALP SERPL-CCNC: 58 IU/L (ref 44–121)
ALT SERPL-CCNC: 24 IU/L (ref 0–44)
AST SERPL-CCNC: 38 IU/L (ref 0–40)
BILIRUB SERPL-MCNC: 0.5 MG/DL (ref 0–1.2)
BUN SERPL-MCNC: 12 MG/DL (ref 6–20)
BUN/CREAT SERPL: 15 (ref 9–20)
CALCIUM SERPL-MCNC: 9.5 MG/DL (ref 8.7–10.2)
CHLORIDE SERPL-SCNC: 107 MMOL/L (ref 96–106)
CO2 SERPL-SCNC: 20 MMOL/L (ref 20–29)
CREAT SERPL-MCNC: 0.8 MG/DL (ref 0.76–1.27)
ERYTHROCYTE [DISTWIDTH] IN BLOOD BY AUTOMATED COUNT: 14.4 % (ref 11.6–15.4)
EST. AVERAGE GLUCOSE BLD GHB EST-MCNC: 111 MG/DL
GLOBULIN SER CALC-MCNC: 3.2 G/DL (ref 1.5–4.5)
GLUCOSE SERPL-MCNC: 74 MG/DL (ref 65–99)
HBA1C MFR BLD: 5.5 % (ref 4.8–5.6)
HCT VFR BLD AUTO: 42.4 % (ref 37.5–51)
HGB BLD-MCNC: 13.6 G/DL (ref 13–17.7)
MCH RBC QN AUTO: 28.7 PG (ref 26.6–33)
MCHC RBC AUTO-ENTMCNC: 32.1 G/DL (ref 31.5–35.7)
MCV RBC AUTO: 90 FL (ref 79–97)
PLATELET # BLD AUTO: 171 X10E3/UL (ref 150–450)
POTASSIUM SERPL-SCNC: 4.6 MMOL/L (ref 3.5–5.2)
PROT SERPL-MCNC: 7.3 G/DL (ref 6–8.5)
RBC # BLD AUTO: 4.74 X10E6/UL (ref 4.14–5.8)
SODIUM SERPL-SCNC: 142 MMOL/L (ref 134–144)
TSH SERPL DL<=0.005 MIU/L-ACNC: 0.85 UIU/ML (ref 0.45–4.5)
WBC # BLD AUTO: 4.9 X10E3/UL (ref 3.4–10.8)

## 2021-10-27 NOTE — PROGRESS NOTES
Dear Emanuel Art    I have reviewed your most recent labs, and a copy is attached. Your A1c (average blood sugar) is normal and below the range for diabetes or prediabetes. Your CBC (oxygen carrying cells, blood-clot forming cells, and immune-fighting cells) is normal.Your thyroid function is in the normal range. Your kidney and liver function are normal. Your blood sugar, electrolytes, and calcium levels are normal. If you have any questions, please feel free to call our office at [phone]. Sincerely,    Lacy Angelo M.D.   [facility] [address]  Phone: [phone]

## 2021-11-01 PROBLEM — Z91.89 AT RISK FOR OBSTRUCTIVE SLEEP APNEA: Status: ACTIVE | Noted: 2021-11-01

## 2021-11-01 PROBLEM — F31.9 BIPOLAR DISORDER (HCC): Status: ACTIVE | Noted: 2021-11-01

## 2021-11-01 PROBLEM — I10 ESSENTIAL HYPERTENSION: Status: ACTIVE | Noted: 2021-11-01

## 2021-11-16 ENCOUNTER — HOSPITAL ENCOUNTER (OUTPATIENT)
Dept: NUTRITION | Age: 31
Discharge: HOME | End: 2021-11-16
Payer: MEDICAID

## 2021-11-16 PROCEDURE — 97802 MEDICAL NUTRITION INDIV IN: CPT | Performed by: DIETITIAN, REGISTERED

## 2021-11-16 NOTE — PROGRESS NOTES
Luis Keyes was informed of the inherent limitations of a virtual visit,  and has consented to a virtual therapy visit on 2021. Information regarding emergency contact information for this patient during this visit is to contact:  Bryce at [phone] in addition to calling 911. The patient was informed that at any time during the virtual visit, they can decide to stop the virtual visit. The patient verified that they are physically located in the [facility] for this virtual visit. [id number] [facility]     Nutrition Assessment - Medical Nutrition Therapy   Outpatient Initial Evaluation         Patient Name: Deisy Jimenez : 1990   Treatment Diagnosis: E66.01 (ICD-10-CM) - Morbid obesity [facility])   Referral Source: Supriya Brandon MD [facility]): 2021     In time:   11:30am             Out time:   12:30pm   Total Treatment Time (min):   60     Gender: male Age: 27 y.o. Ht: 75 in Wt: 343 (PCP note)  lb  kg   BMI: 42.9 BMR   Male 2591 BMR Female      Past Medical History:  Patient Active Problem List   Diagnosis Code    Hyperlipidemia E78.5    Psychiatric diagnosis F99    IGT (impaired glucose tolerance) R73.02    Intellectual disability F79    Morbid obesity (HCC) E66.01    Bipolar disorder ([facility] F31.9    Essential hypertension I10    At risk for obstructive sleep apnea Z91.89        Pertinent Medications:     Current Outpatient Medications:     aspirin delayed-release 81 mg tablet, Take 1 Tablet by mouth daily. , Disp: 30 Tablet, Rfl: 11    hydroCHLOROthiazide (HYDRODIURIL) 12.5 mg tablet, TAKE (1) TABLET BY MOUTH DAILY FOR HIGH BLOOD PRESSURE., Disp: 31 Tablet, Rfl: 11    cloNIDine HCL (CATAPRES) 0.1 mg tablet, TAKE 1 TABLET BY MOUTH TWICE A DAY, Disp: 60 Tab, Rfl: 11    divalproex DR (DEPAKOTE) 500 mg tablet, TAKE 2 TABLETS BY MOUTH TWICE DAILY, Disp: 120 Tab, Rfl: 11    potassium chloride SR (KLOR-CON 10) 10 mEq tablet, Take 1 Tab by mouth daily. , Disp: 30 Tab, Rfl: PRN    melatonin 5 mg tablet, Take 5 mg by mouth nightly., Disp: , Rfl:     risperiDONE (RisperDAL) 4 mg tablet, Take 4 mg by mouth nightly., Disp: , Rfl:     lithium carbonate SR (LITHOBID) 300 mg CR tablet, Take 300 mg by mouth nightly. Takes with 450 mg tab for total of 750 mg , Disp: , Rfl:     QUEtiapine (SEROQUEL) 200 mg tablet, Take 200 mg by mouth three (3) times daily. , Disp: , Rfl:     risperiDONE (RisperDAL) 2 mg tablet, Take 2 mg by mouth daily. , Disp: , Rfl:     topiramate (TOPAMAX) 100 mg tablet, Take 100 mg by mouth two (2) times a day., Disp: , Rfl:      Biochemical Data:   Lab Results   Component Value Date/Time    Hemoglobin A1c 5.5 10/26/2021 12:00 AM    Hemoglobin A1c (POC) 5.2 02/22/2017 02:36 PM     Lab Results   Component Value Date/Time    Sodium 142 10/26/2021 12:00 AM    Potassium 4.6 10/26/2021 12:00 AM    Chloride 107 (H) 10/26/2021 12:00 AM    CO2 20 10/26/2021 12:00 AM    Anion gap 3 (L) 09/12/2020 04:50 AM    Glucose 74 10/26/2021 12:00 AM    BUN 12 10/26/2021 12:00 AM    Creatinine 0.80 10/26/2021 12:00 AM    BUN/Creatinine ratio 15 10/26/2021 12:00 AM    GFR est  10/26/2021 12:00 AM    GFR est non- 10/26/2021 12:00 AM    Calcium 9.5 10/26/2021 12:00 AM    Bilirubin, total 0.5 10/26/2021 12:00 AM    Alk.  phosphatase 58 10/26/2021 12:00 AM    Protein, total 7.3 10/26/2021 12:00 AM    Albumin 4.1 10/26/2021 12:00 AM    Globulin 3.3 09/12/2020 04:50 AM    A-G Ratio 1.3 10/26/2021 12:00 AM    ALT (SGPT) 24 10/26/2021 12:00 AM    AST (SGOT) 38 10/26/2021 12:00 AM     Lab Results   Component Value Date/Time    Cholesterol, total 181 06/17/2021 12:00 AM    HDL Cholesterol 49 06/17/2021 12:00 AM    LDL, calculated 116 (H) 06/17/2021 12:00 AM    LDL, calculated 97 11/21/2019 10:30 AM    VLDL, calculated 16 06/17/2021 12:00 AM    VLDL, calculated 16 11/21/2019 10:30 AM    Triglyceride 86 06/17/2021 12:00 AM     BP Readings from Last 3 Encounters:   10/26/21 122/80   06/17/21 121/84   03/30/21 119/83          Assessment:     Pt is a 34yo male living in a group home. Session took place with his legal gardian, home director, and day . They have been concerned with his weight gain over the past few months. Caregivers state they were not aware he had gained 40# in 4 months. Pt does not have weight or bp taken at the group home currently. Gilberto = guardian. Noticed he tried adding multiple condiments (caba 5 packets). Arlene Ocampo = group home director  Jax Haney =   Wt Readings from Last 10 Encounters:   10/26/21 155.6 kg (343 lb)   06/17/21 136.4 kg (300 lb 12.8 oz)   03/30/21 130.9 kg (288 lb 8 oz)   10/08/20 119.7 kg (263 lb 12.8 oz)   09/18/20 124.6 kg (274 lb 12.8 oz)   09/07/20 126.8 kg (279 lb 8.7 oz)   01/28/20 132 kg (291 lb)   11/21/19 122 kg (269 lb)   05/24/19 117.9 kg (260 lb)   05/15/18 117.9 kg (260 lb)   {  Activity: pt picks and chooses what he does or does not want to do. Does not like to exercise. With 1:1 provider in evenings for about 2 years. MOnte gets out to track to walk or do indoor exercises. Unsure how well that is currently going. Food & Nutrition: 3 meals + 2-3 snacks per day by home director. Meals are made and dropped off at the home. Snacks: fruit cups, fresh fruit (apples, bananas), granola bars, trail mix,. Gets 1 per snack. Maybe up to 3 snacks per day. Out in community in afternoon. He may have access to additional food with his 2:1 staff. If communicated he is getting a dinner he will not get dinner from group home. estimated 3-4 days per week. Behavior issues when hungry. Unsure of quantity. Portioned out for him by staff. No extras provided by group home.  Notes issue with multiple condiemtns being used on foods (5 caba at a time)    B- oatmeal OR cereal OR eggs and turkey sausage with toast  L- salads OR sandwiches with soup OR if out (ex:grilled wrap no smoothie. Recent change to choose healthier. As of end of September)  D- turkey with vegetables OR grilled chicken + ___ OR tenderlion with mixed vegetables (string beans, corn,etc)  Drinks: may get something with 1:1 or 2:1 staff gets fixated on drinks. sometimes at group day may get a Dr. Shelbi Schafer regular or cheetos 1-2 days per week. Requests popeyes. Frequently in the past.   In past was provided with requested food to curve behavior issues. Caregivers are unsure of where extra calories are currently being provided. Estimate Needs = Equation( [x] MSJ ; []  HBE; [] Phillips; [] other)  * Activity Factor (1.2) -500-750   Calories: 2350- 2600 Protein: 130 Carbs: 325 Fat: 87   Kcal/day  g/day  g/day  g/day        percent: 20  50  30               Nutrition Diagnosis Obesity R/T excessive energy intake AEB BMI> 40 and unintended weight gain of 40# in 4 months. Nutrition Intervention &  Education: Worked with caregivers to identify where outside foods are being provided and extra calorie sources. Worked on setting exercise goals for pt, accounting for snacks. Set goals around portion reduction of provided foods and limiting foods provided for behavior modification. Directors will dicusse with others in his care group who may be providing outside food/drink. Recommend diet sodas only. Dicussed possible use of protein shakes. No current menu is followed for group home meals, thus focused on reducing current portions of usual foods instead of implementing specific calorie controlled menu.     Handouts Provided: []  Carbohydrates  []  Protein  []  Non-starchy Vegatbles  []  Food Label  []  Meal and Snack Ideas  []  Food Journals []  Diabetes  []  Cholesterol  []  Sodium  []  Gen Nutr Guidelines  []  SBGM Guidelines  []  Others:   Information Reviewed with: Caregivers (Ralph Gamble), pt   Readiness to Change Stage: []  Pre-contemplative    []  Contemplative  [x]  Preparation               [] Action                  []  Maintenance   Potential Barriers to Learning:                      []  Decline in memory    []  Language barrier   []  Other:  []  Emotional                  []  Limited mobility     []  None  []  Lack of motivation     [x] Vision, hearing or cognitive impairment   Expected Compliance: Good due to support from caregivers     Nutritional Goal - To promote lifestyle changes to result in:    [x]  Weight loss  []  Improved diabetic control  []  Decreased cholesterol levels  []  Decreased blood pressure  []  Weight maintenance []  Preventing any interactions associated with food allergies  []  Adequate weight gain toward goal weight  []  Other:        Patient Goals:   -Pt accessibility to condiments will be restricted and the location will be moved within the next week. Limit to 1 condiment per meal (instread of 5+)   -Pt will consume diet sodas rather than regular sodas 90% of the time within the next month (provided by Karyle Ryder)  [facility] will check in with 1:1 support to know what types of foods and drinks are provided within the next week  -Pt will increase physical activity by 30% over the next month; potentially with use of gym membership or walking 3+ times per week. [facility] will reduce provided meal portions and added fats in foods from current amount by 30%. Non-starchy vegetables can be increased to support fullness at meals. Dietitian Signature: Muriel Del Rosario MS, RD, CSSD Date: 11/16/2021   Follow-up: 1 month Time: 11:45 AM   Luis Jaimes is a 27 y.o. male being evaluated by a Virtual Visit (video visit) encounter to address concerns as mentioned above. A caregiver was present when appropriate.  Due to this being a TeleHealth encounter (During [place] [address] public health emergency), evaluation of the following areas was limited: Nutrition Focused Physical Exam. Pursuant to the emergency declaration under the [facility] waiver authority and the Coronavirus Preparedness and Response Supplemental Appropriations Act, this Virtual Visit was conducted with patient's (and/or legal guardian's) consent, to reduce the risk of exposure to COVID-19 and provide necessary medical care. Services were provided through a video synchronous discussion virtually to substitute for in-person encounter. --Winston Mccarty on 11/16/2021 at 11:45 AM    An electronic signature was used to authenticate this note.

## 2022-01-18 ENCOUNTER — TELEPHONE (OUTPATIENT)
Dept: FAMILY MEDICINE CLINIC | Age: 32
End: 2022-01-18

## 2022-01-18 DIAGNOSIS — F91.3 OPPOSITIONAL DEFIANT DISORDER: ICD-10-CM

## 2022-01-18 DIAGNOSIS — F71 MODERATE INTELLECTUAL DISABILITIES: Primary | ICD-10-CM

## 2022-01-18 NOTE — TELEPHONE ENCOUNTER
Chandra Bautista [facility]) [phone] (H)     Pt real called and needs order to be written for Pt. RX for speech therapy evaluation and treatment. Make sure to include both ICD code: F71 and F91.3. Please call back if there are any questions or concerns. [facility]  Fax #: [phone]. Offered to schedule an appointment with a provider. Stated that it was not needed.

## 2022-01-21 NOTE — TELEPHONE ENCOUNTER
Called, left vm for pt to return call to office. Message left that Paperwork was faxed and confirmed.

## 2022-02-21 ENCOUNTER — OFFICE VISIT (OUTPATIENT)
Dept: INTERNAL MEDICINE CLINIC | Age: 32
End: 2022-02-21
Payer: MEDICAID

## 2022-02-21 VITALS
BODY MASS INDEX: 39.17 KG/M2 | OXYGEN SATURATION: 98 % | SYSTOLIC BLOOD PRESSURE: 130 MMHG | DIASTOLIC BLOOD PRESSURE: 70 MMHG | WEIGHT: 315 LBS | RESPIRATION RATE: 20 BRPM | TEMPERATURE: 98.3 F | HEIGHT: 75 IN | HEART RATE: 100 BPM

## 2022-02-21 DIAGNOSIS — E66.01 CLASS 3 SEVERE OBESITY WITH SERIOUS COMORBIDITY AND BODY MASS INDEX (BMI) OF 40.0 TO 44.9 IN ADULT, UNSPECIFIED OBESITY TYPE (HCC): ICD-10-CM

## 2022-02-21 DIAGNOSIS — F31.9 BIPOLAR AFFECTIVE DISORDER, REMISSION STATUS UNSPECIFIED (HCC): ICD-10-CM

## 2022-02-21 DIAGNOSIS — I10 ESSENTIAL HYPERTENSION: Primary | ICD-10-CM

## 2022-02-21 DIAGNOSIS — Z00.00 HEALTHCARE MAINTENANCE: ICD-10-CM

## 2022-02-21 DIAGNOSIS — F79 INTELLECTUAL DISABILITY: ICD-10-CM

## 2022-02-21 PROCEDURE — 99214 OFFICE O/P EST MOD 30 MIN: CPT | Performed by: INTERNAL MEDICINE

## 2022-02-21 NOTE — PROGRESS NOTES
Office Visit Note:    Assessment/Plan:  1. Essential hypertension    2. Intellectual disability    3. Healthcare maintenance    4. Class 3 severe obesity with serious comorbidity and body mass index (BMI) of 40.0 to 44.9 in adult, unspecified obesity type ([facility]    5. Bipolar affective disorder, remission status unspecified ([facility]      1. Hypertension-blood pressure controlled, continue on clonidine and hydrochlorothiazide. He has never had a heart attack or stroke or is a history of peripheral vascular disease. He apparently was taking aspirin 81 mg daily, since I do not see any indication for aspirin have advised him to stop taking the aspirin. Also he was on potassium supplementation and his last potassium was 4.6, advised to stop it  2. Intellectual disability-stable, lives in a group home  3. Bipolar disorder-on multiple medications including Seroquel, Risperdal, Topamax, lithium, Depakote, managed by psychiatry  4. Obesity-advised lifestyle changes to the group home staff    Health Maintenance:  Immunizations:  Tetanus: due 2023  Influenza: advised to get it next season  Covid: 3 doses moderna    Screening:  Hepatitis C: We will plan to check with next set of labs  HIV: We will plan to check with the next set of labs  Exercise: walk daily  Diet:fair  Sleep: good  Follow-up in 3 months      No orders of the defined types were placed in this encounter.     Social Determinants of Health     Tobacco Use: Low Risk     Smoking Tobacco Use: Never Smoker    Smokeless Tobacco Use: Never Used   Alcohol Use:     Frequency of Alcohol Consumption: Not on file    Average Number of Drinks: Not on file    Frequency of Binge Drinking: Not on file   Financial Resource Strain:     Difficulty of Paying Living Expenses: Not on file   Food Insecurity:     Worried About Running Out of Food in the Last Year: Not on file    Ila of Food in the Last Year: Not on file   Transportation Needs:     Lack of Transportation (Medical): Not on file    Lack of Transportation (Non-Medical): Not on file   Physical Activity:     Days of Exercise per Week: Not on file    Minutes of Exercise per Session: Not on file   Stress:     Feeling of Stress : Not on file   Social Connections:     Frequency of Communication with Friends and Family: Not on file    Frequency of Social Gatherings with Friends and Family: Not on file    Attends Sabianist Services: Not on file    Active Member of [address] [place] [address] [place] or Organizations: Not on file    Attends Club or Organization Meetings: Not on file    Marital Status: Not on file   Intimate Partner Violence:     Fear of Current or Ex-Partner: Not on file    Emotionally Abused: Not on file    Physically Abused: Not on file    Sexually Abused: Not on file   Depression: Not at risk    PHQ-2 Score: 0   Housing Stability:     Unable to Pay for Housing in the Last Year: Not on file    Number of [place] in the Last Year: Not on file    Unstable Housing in the Last Year: Not on file       Follow-up and Dispositions    · Return in about 3 months (around 5/21/2022). I have reviewed with the patient details of the assessment and plan and all questions were answered. Relevant patient education was performed. The most recent lab findings were reviewed with the patient. An After Visit Summary was printed and given to the patient. Reason for Visit: Complete Physical      Subjective:  27 y.o. male with h/o hypertension, bipolar disorder and intellectual disability who comes to establish care with me. His last PCP apparently moved out of that office. Not much history is available from the patient due to his intellectual disability. Apparently he denies any discomfort other than a small discomfort in his right forearm. Not sure if it is pain. But he does point to his forearm. No other real complaints. Review of Systems  Review of systems were limited because of his intellectual disability. .    Past Medical History:   Diagnosis Date    Altered mental status 9/4/2020    Hyperlipidemia     Lithium toxicity 9/4/2020    Mental retardation     Obesity (BMI 30-39. 9)     Type II or unspecified type diabetes mellitus without mention of complication, not stated as uncontrolled      History reviewed. No pertinent surgical history. Social History     Socioeconomic History    Marital status: SINGLE   Tobacco Use    Smoking status: Never Smoker    Smokeless tobacco: Never Used   Vaping Use    Vaping Use: Never used   Substance and Sexual Activity    Alcohol use: No    Drug use: No    Sexual activity: Never     History reviewed. No pertinent family history. Current Outpatient Medications   Medication Sig Dispense Refill    hydroCHLOROthiazide (HYDRODIURIL) 12.5 mg tablet TAKE (1) TABLET BY MOUTH DAILY FOR HIGH BLOOD PRESSURE. 31 Tablet 11    cloNIDine HCL (CATAPRES) 0.1 mg tablet TAKE 1 TABLET BY MOUTH TWICE A DAY 60 Tab 11    divalproex DR (DEPAKOTE) 500 mg tablet TAKE 2 TABLETS BY MOUTH TWICE DAILY 120 Tab 11    melatonin 5 mg tablet Take 5 mg by mouth nightly.  risperiDONE (RisperDAL) 4 mg tablet Take 4 mg by mouth nightly.  lithium carbonate SR (LITHOBID) 300 mg CR tablet Take 300 mg by mouth nightly. Takes with 450 mg tab for total of 750 mg       QUEtiapine (SEROQUEL) 200 mg tablet Take 200 mg by mouth three (3) times daily.  risperiDONE (RisperDAL) 2 mg tablet Take 2 mg by mouth daily.  topiramate (TOPAMAX) 100 mg tablet Take 100 mg by mouth two (2) times a day. Allergies   Allergen Reactions    Chocolate Flavor Unknown (comments)       Objective:  Visit Vitals  /70 (BP 1 Location: Left upper arm, BP Patient Position: Sitting, BP Cuff Size: Adult)   Pulse 100   Temp 98.3 °F (36.8 °C) (Oral)   Resp 20   Ht 6' 3\" (1.905 m)   Wt 346 lb (156.9 kg)   SpO2 98%   BMI 43.25 kg/m²     Physical Exam:   Calm and cooperative, nods to questions, not in any distress.   HEENT: ENT negative. Neck: Supple  Lungs: clear  Heart: S1 S2 +, RRR  Abdomen: Soft, No tenderness  Neuro: No focal deficits. Extremities: no pedal edema, good peripheral pulses  Results for orders placed or performed in visit on 10/26/21   TSH 3RD GENERATION   Result Value Ref Range    TSH 0.855 0.450 - 5.372 uIU/mL   METABOLIC PANEL, COMPREHENSIVE   Result Value Ref Range    Glucose 74 65 - 99 mg/dL    BUN 12 6 - 20 mg/dL    Creatinine 0.80 0.76 - 1.27 mg/dL    GFR est non- >59 mL/min/1.73    GFR est  >59 mL/min/1.73    BUN/Creatinine ratio 15 9 - 20    Sodium 142 134 - 144 mmol/L    Potassium 4.6 3.5 - 5.2 mmol/L    Chloride 107 (H) 96 - 106 mmol/L    CO2 20 20 - 29 mmol/L    Calcium 9.5 8.7 - 10.2 mg/dL    Protein, total 7.3 6.0 - 8.5 g/dL    Albumin 4.1 4.0 - 5.0 g/dL    GLOBULIN, TOTAL 3.2 1.5 - 4.5 g/dL    A-G Ratio 1.3 1.2 - 2.2    Bilirubin, total 0.5 0.0 - 1.2 mg/dL    Alk. phosphatase 58 44 - 121 IU/L    AST (SGOT) 38 0 - 40 IU/L    ALT (SGPT) 24 0 - 44 IU/L   CBC W/O DIFF   Result Value Ref Range    WBC 4.9 3.4 - 10.8 x10E3/uL    RBC 4.74 4.14 - 5.80 x10E6/uL    HGB 13.6 13.0 - 17.7 g/dL    HCT 42.4 37.5 - 51.0 %    MCV 90 79 - 97 fL    MCH 28.7 26.6 - 33.0 pg    MCHC 32.1 31.5 - 35.7 g/dL    RDW 14.4 11.6 - 15.4 %    PLATELET 796 089 - 572 x10E3/uL   HEMOGLOBIN A1C WITH EAG   Result Value Ref Range    Hemoglobin A1c 5.5 4.8 - 5.6 %    Estimated average glucose 111 mg/dL         Pal Ho MD, FACP, [facility].   [address], [place], [place].

## 2022-02-21 NOTE — PROGRESS NOTES
Chief Complaint   Patient presents with    Complete Physical     3 most recent PHQ Screens 2/21/2022   PHQ Not Done -   Little interest or pleasure in doing things Not at all   Feeling down, depressed, irritable, or hopeless Not at all   Total Score PHQ 2 0     1. Have you been to the ER, urgent care clinic since your last visit? Hospitalized since your last visit? 2. Have you seen or consulted any other health care providers outside of the [address] [place] [address] since your last visit? Include any pap smears or colon screening.  no

## 2022-03-19 PROBLEM — Z91.89 AT RISK FOR OBSTRUCTIVE SLEEP APNEA: Status: ACTIVE | Noted: 2021-11-01

## 2022-03-19 PROBLEM — I10 ESSENTIAL HYPERTENSION: Status: ACTIVE | Noted: 2021-11-01

## 2022-03-19 PROBLEM — E66.01 MORBID OBESITY (HCC): Status: ACTIVE | Noted: 2018-05-15

## 2022-03-20 PROBLEM — F31.9 BIPOLAR DISORDER (HCC): Status: ACTIVE | Noted: 2021-11-01

## 2022-05-06 RX ORDER — CLONIDINE HYDROCHLORIDE 0.1 MG/1
0.1 TABLET ORAL 2 TIMES DAILY
Qty: 60 TABLET | Refills: 11 | Status: SHIPPED | OUTPATIENT
Start: 2022-05-06 | End: 2022-05-23 | Stop reason: SDUPTHER

## 2022-05-06 RX ORDER — DIVALPROEX SODIUM 500 MG/1
1000 TABLET, DELAYED RELEASE ORAL 2 TIMES DAILY
Qty: 120 TABLET | Refills: 11 | Status: SHIPPED | OUTPATIENT
Start: 2022-05-06

## 2022-05-06 NOTE — TELEPHONE ENCOUNTER
Pt has now established care with Dr. Xochitl Baca. Previous PCP was [facility] Thacker. Last visit 02/21/2022 MD Frank Nunez   Next appointment 05/23/2022 MD Frank Nunez   Previous refill encounter(s)   04/29/2021:  - Catapres #60 with 11 refills,  - Depakote-DR #120 with 11 refills - both where written by [facility] Thacker,     Requested Prescriptions     Pending Prescriptions Disp Refills    cloNIDine HCL (CATAPRES) 0.1 mg tablet 60 Tablet 11     Sig: Take 1 Tablet by mouth two (2) times a day.  divalproex DR (DEPAKOTE) 500 mg tablet 120 Tablet 11     Sig: Take 2 Tablets by mouth two (2) times a day.

## 2022-05-23 ENCOUNTER — OFFICE VISIT (OUTPATIENT)
Dept: INTERNAL MEDICINE CLINIC | Age: 32
End: 2022-05-23
Payer: MEDICAID

## 2022-05-23 VITALS
HEIGHT: 75 IN | OXYGEN SATURATION: 98 % | RESPIRATION RATE: 20 BRPM | WEIGHT: 315 LBS | SYSTOLIC BLOOD PRESSURE: 120 MMHG | BODY MASS INDEX: 39.17 KG/M2 | DIASTOLIC BLOOD PRESSURE: 70 MMHG | TEMPERATURE: 98.5 F | HEART RATE: 90 BPM

## 2022-05-23 DIAGNOSIS — F31.9 BIPOLAR AFFECTIVE DISORDER, REMISSION STATUS UNSPECIFIED (HCC): ICD-10-CM

## 2022-05-23 DIAGNOSIS — I10 ESSENTIAL HYPERTENSION: ICD-10-CM

## 2022-05-23 DIAGNOSIS — Z11.4 SCREENING FOR HIV (HUMAN IMMUNODEFICIENCY VIRUS): ICD-10-CM

## 2022-05-23 DIAGNOSIS — Z00.00 HEALTHCARE MAINTENANCE: ICD-10-CM

## 2022-05-23 DIAGNOSIS — E66.01 CLASS 3 SEVERE OBESITY WITH SERIOUS COMORBIDITY AND BODY MASS INDEX (BMI) OF 40.0 TO 44.9 IN ADULT, UNSPECIFIED OBESITY TYPE (HCC): ICD-10-CM

## 2022-05-23 DIAGNOSIS — F79 INTELLECTUAL DISABILITY: ICD-10-CM

## 2022-05-23 DIAGNOSIS — Z11.59 ENCOUNTER FOR HEPATITIS C SCREENING TEST FOR LOW RISK PATIENT: ICD-10-CM

## 2022-05-23 DIAGNOSIS — N30.90 CYSTITIS: Primary | ICD-10-CM

## 2022-05-23 PROCEDURE — 99214 OFFICE O/P EST MOD 30 MIN: CPT | Performed by: INTERNAL MEDICINE

## 2022-05-23 RX ORDER — CLONIDINE HYDROCHLORIDE 0.1 MG/1
0.1 TABLET ORAL 2 TIMES DAILY
Qty: 60 TABLET | Refills: 11 | Status: SHIPPED | OUTPATIENT
Start: 2022-05-23

## 2022-05-23 RX ORDER — HYDROCHLOROTHIAZIDE 12.5 MG/1
TABLET ORAL
Qty: 90 TABLET | Refills: 3 | Status: SHIPPED | OUTPATIENT
Start: 2022-05-23

## 2022-05-23 RX ORDER — SULFAMETHOXAZOLE AND TRIMETHOPRIM 800; 160 MG/1; MG/1
1 TABLET ORAL 2 TIMES DAILY
Qty: 14 TABLET | Refills: 0 | Status: SHIPPED | OUTPATIENT
Start: 2022-05-23
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
## 2022-05-23 NOTE — PATIENT INSTRUCTIONS
DASH Diet: Care Instructions  Your Care Instructions     The DASH diet is an eating plan that can help lower your blood pressure. DASH stands for Dietary Approaches to Stop Hypertension. Hypertension is high blood pressure. The DASH diet focuses on eating foods that are high in calcium, potassium, and magnesium. These nutrients can lower blood pressure. The foods that are highest in these nutrients are fruits, vegetables, low-fat dairy products, nuts, seeds, and legumes. But taking calcium, potassium, and magnesium supplements instead of eating foods that are high in those nutrients does not have the same effect. The DASH diet also includes whole grains, fish, and poultry. The DASH diet is one of several lifestyle changes your doctor may recommend to lower your high blood pressure. Your doctor may also want you to decrease the amount of sodium in your diet. Lowering sodium while following the DASH diet can lower blood pressure even further than just the DASH diet alone. Follow-up care is a key part of your treatment and safety. Be sure to make and go to all appointments, and call your doctor if you are having problems. It's also a good idea to know your test results and keep a list of the medicines you take. How can you care for yourself at home? Following the DASH diet  · Eat 4 to 5 servings of fruit each day. A serving is 1 medium-sized piece of fruit, ½ cup chopped or canned fruit, 1/4 cup dried fruit, or 4 ounces (½ cup) of fruit juice. Choose fruit more often than fruit juice. · Eat 4 to 5 servings of vegetables each day. A serving is 1 cup of lettuce or raw leafy vegetables, ½ cup of chopped or cooked vegetables, or 4 ounces (½ cup) of vegetable juice. Choose vegetables more often than vegetable juice. · Get 2 to 3 servings of low-fat and fat-free dairy each day. A serving is 8 ounces of milk, 1 cup of yogurt, or 1 ½ ounces of cheese. · Eat 6 to 8 servings of grains each day.  A serving is 1 slice of bread, 1 ounce of dry cereal, or ½ cup of cooked rice, pasta, or cooked cereal. Try to choose whole-grain products as much as possible. · Limit lean meat, poultry, and fish to 2 servings each day. A serving is 3 ounces, about the size of a deck of cards. · Eat 4 to 5 servings of nuts, seeds, and legumes (cooked dried beans, lentils, and split peas) each week. A serving is 1/3 cup of nuts, 2 tablespoons of seeds, or ½ cup of cooked beans or peas. · Limit fats and oils to 2 to 3 servings each day. A serving is 1 teaspoon of vegetable oil or 2 tablespoons of salad dressing. · Limit sweets and added sugars to 5 servings or less a week. A serving is 1 tablespoon jelly or jam, ½ cup sorbet, or 1 cup of lemonade. · Eat less than 2,300 milligrams (mg) of sodium a day. If you limit your sodium to 1,500 mg a day, you can lower your blood pressure even more. · Be aware that all of these are the suggested number of servings for people who eat 1,800 to 2,000 calories a day. Your recommended number of servings may be different if you need more or fewer calories. Tips for success  · Start small. Do not try to make dramatic changes to your diet all at once. You might feel that you are missing out on your favorite foods and then be more likely to not follow the plan. Make small changes, and stick with them. Once those changes become habit, add a few more changes. · Try some of the following:  ? Make it a goal to eat a fruit or vegetable at every meal and at snacks. This will make it easy to get the recommended amount of fruits and vegetables each day. ? Try yogurt topped with fruit and nuts for a snack or healthy dessert. ? Add lettuce, tomato, cucumber, and onion to sandwiches. ? Combine a ready-made pizza crust with low-fat mozzarella cheese and lots of vegetable toppings. Try using tomatoes, squash, spinach, broccoli, carrots, cauliflower, and onions. ?  Have a variety of cut-up vegetables with a low-fat dip as an appetizer instead of chips and dip. ? Sprinkle sunflower seeds or chopped almonds over salads. Or try adding chopped walnuts or almonds to cooked vegetables. ? Try some vegetarian meals using beans and peas. Add garbanzo or kidney beans to salads. Make burritos and tacos with mashed ingram beans or black beans. Where can you learn more? Go to http://www.kong.com/  Enter H967 in the search box to learn more about \"DASH Diet: Care Instructions. \"  Current as of: January 10, 2022               Content Version: 13.2  © 7433-8565 [employer]. Care instructions adapted under license by [employer] (which disclaims liability or warranty for this information). If you have questions about a medical condition or this instruction, always ask your healthcare professional. [address] any warranty or liability for your use of this information.
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
## 2022-05-24 LAB
BUN SERPL-MCNC: 10 MG/DL (ref 6–20)
BUN/CREAT SERPL: 12 (ref 9–20)
CALCIUM SERPL-MCNC: 9 MG/DL (ref 8.7–10.2)
CHLORIDE SERPL-SCNC: 108 MMOL/L (ref 96–106)
CO2 SERPL-SCNC: 20 MMOL/L (ref 20–29)
CREAT SERPL-MCNC: 0.84 MG/DL (ref 0.76–1.27)
EGFR: 118 ML/MIN/1.73
GLUCOSE SERPL-MCNC: 68 MG/DL (ref 65–99)
HCV AB S/CO SERPL IA: <0.1 S/CO RATIO (ref 0–0.9)
HIV 1+2 AB+HIV1 P24 AG SERPL QL IA: NON REACTIVE
POTASSIUM SERPL-SCNC: 4.3 MMOL/L (ref 3.5–5.2)
SODIUM SERPL-SCNC: 142 MMOL/L (ref 134–144)

## 2023-06-01 RX ORDER — HYDROCHLOROTHIAZIDE 12.5 MG/1
TABLET ORAL
Qty: 30 TABLET | Refills: 0 | Status: SHIPPED | OUTPATIENT
Start: 2023-06-01

## 2023-06-01 NOTE — TELEPHONE ENCOUNTER
Pt was a previous pt of Dr. Caren Bender. Please contact pt for an office visit. Thank you. Last appointment: 05/23/2022 MD Lesvia Ovalles   Next appointment: Nothing scheduled   Previous refill encounter(s):   05/23/2022 HCTZ #90 with 3 refills. For Pharmacy Admin Tracking Only    Program: Medication Refill  Intervention Detail: New Rx: 1, reason: Patient Preference  Time Spent (min): 5      Requested Prescriptions     Pending Prescriptions Disp Refills    hydroCHLOROthiazide (HYDRODIURIL) 12.5 MG tablet 30 tablet 0     Sig: TAKE (1) TABLET BY MOUTH DAILY FOR HIGH BLOOD PRESSURE.

## 2023-08-09 RX ORDER — HYDROCHLOROTHIAZIDE 12.5 MG/1
TABLET ORAL
Qty: 30 TABLET | Refills: 12 | OUTPATIENT
Start: 2023-08-09

## 2025-06-16 ENCOUNTER — OFFICE VISIT (OUTPATIENT)
Facility: CLINIC | Age: 35
End: 2025-06-16
Payer: MEDICAID

## 2025-06-16 VITALS
HEART RATE: 85 BPM | WEIGHT: 315 LBS | BODY MASS INDEX: 39.17 KG/M2 | HEIGHT: 75 IN | DIASTOLIC BLOOD PRESSURE: 87 MMHG | OXYGEN SATURATION: 98 % | SYSTOLIC BLOOD PRESSURE: 125 MMHG | TEMPERATURE: 98.7 F

## 2025-06-16 DIAGNOSIS — I10 ESSENTIAL HYPERTENSION: ICD-10-CM

## 2025-06-16 DIAGNOSIS — R32 ENURESIS: Primary | ICD-10-CM

## 2025-06-16 DIAGNOSIS — R73.02 IGT (IMPAIRED GLUCOSE TOLERANCE): ICD-10-CM

## 2025-06-16 DIAGNOSIS — E66.01 MORBID OBESITY (HCC): ICD-10-CM

## 2025-06-16 DIAGNOSIS — F63.9 IMPULSE DISORDER: ICD-10-CM

## 2025-06-16 DIAGNOSIS — E78.2 MIXED HYPERLIPIDEMIA: ICD-10-CM

## 2025-06-16 DIAGNOSIS — K59.00 CONSTIPATION, UNSPECIFIED CONSTIPATION TYPE: ICD-10-CM

## 2025-06-16 DIAGNOSIS — F39 MOOD DISORDER: ICD-10-CM

## 2025-06-16 PROCEDURE — 3074F SYST BP LT 130 MM HG: CPT | Performed by: NURSE PRACTITIONER

## 2025-06-16 PROCEDURE — 3079F DIAST BP 80-89 MM HG: CPT | Performed by: NURSE PRACTITIONER

## 2025-06-16 PROCEDURE — 99203 OFFICE O/P NEW LOW 30 MIN: CPT | Performed by: NURSE PRACTITIONER

## 2025-06-16 RX ORDER — SOLIFENACIN SUCCINATE 10 MG/1
10 TABLET, FILM COATED ORAL DAILY
COMMUNITY

## 2025-06-16 RX ORDER — DOCUSATE SODIUM 100 MG/1
100 CAPSULE, LIQUID FILLED ORAL 2 TIMES DAILY
Qty: 180 CAPSULE | Refills: 2 | Status: SHIPPED | OUTPATIENT
Start: 2025-06-16 | End: 2026-03-13

## 2025-06-16 RX ORDER — AMMONIUM LACTATE 12 G/100G
CREAM TOPICAL 2 TIMES DAILY PRN
COMMUNITY

## 2025-06-16 RX ORDER — DESMOPRESSIN ACETATE 0.2 MG/1
0.2 TABLET ORAL NIGHTLY
Qty: 90 TABLET | Refills: 1 | Status: SHIPPED | OUTPATIENT
Start: 2025-06-16

## 2025-06-16 RX ORDER — CHLORPROMAZINE HYDROCHLORIDE 50 MG/1
50 TABLET, FILM COATED ORAL 2 TIMES DAILY
COMMUNITY
Start: 2025-05-15

## 2025-06-16 RX ORDER — CHLORPROMAZINE HYDROCHLORIDE 100 MG/1
100 TABLET, FILM COATED ORAL EVERY 12 HOURS PRN
COMMUNITY

## 2025-06-16 SDOH — ECONOMIC STABILITY: FOOD INSECURITY
WITHIN THE PAST 12 MONTHS, YOU WORRIED THAT YOUR FOOD WOULD RUN OUT BEFORE YOU GOT MONEY TO BUY MORE.: PATIENT UNABLE TO ANSWER

## 2025-06-16 SDOH — ECONOMIC STABILITY: FOOD INSECURITY
WITHIN THE PAST 12 MONTHS, THE FOOD YOU BOUGHT JUST DIDN'T LAST AND YOU DIDN'T HAVE MONEY TO GET MORE.: PATIENT UNABLE TO ANSWER

## 2025-06-16 ASSESSMENT — PATIENT HEALTH QUESTIONNAIRE - PHQ9: DEPRESSION UNABLE TO ASSESS: FUNCTIONAL CAPACITY MOTIVATION LIMITS ACCURACY

## 2025-06-16 NOTE — PROGRESS NOTES
Skip Silva is a 35 y.o. male , id x 2(name and ). Reviewed record, history, and  medications.    This patient is accompanied in the office by his caregiver Ronaldo Zuniga.I have received verbal consent from Skip Silva to discuss any/all medical information while they are present in the room.    Chief Complaint   Patient presents with    New Patient       Vitals:    25 0926   Height: 1.905 m (6' 3\")       Non-Fasting    Med Review  Reviewed: Medications reviewed changes as follows see rec.  Follow up actions taken notified physician.  Compliance: compliant with all meds  List provided: ptmedlist: yes      \"Have you been to the ER, urgent care clinic since your last visit?  Hospitalized since your last visit?\"    NO    “Have you seen or consulted any other health care providers outside of [facility] since your last visit?”    NO        2022     9:00 AM   Amb Fall Risk Assessment and TUG Test   Fall in past 12 months? 0   Able to walk? Yes         2022     9:00 AM   PHQ-9    Little interest or pleasure in doing things 0   Little interest or pleasure in doing things 0   Feeling down, depressed, or hopeless 0   PHQ-2 Score 0   Total Score PHQ 2 0   PHQ-9 Total Score 0          No data to display              SDOH:  Social Drivers of Health     Tobacco Use: Low Risk  (2025)    Patient History     Smoking Tobacco Use: Never     Smokeless Tobacco Use: Never     Passive Exposure: Not on file   Alcohol Use: Not on file   Financial Resource Strain: Not on file   Food Insecurity: Not on file   Transportation Needs: Not on file   Physical Activity: Not on file   Stress: Not on file   Social Connections: Not on file   Intimate Partner Violence: Not on file   Depression: Not at risk (2022)    PHQ-2     PHQ-2 Score: 0   Housing Stability: Not on file   Interpersonal Safety: Not on file   Utilities: Not on file       Click Here for Release of Records Request

## 2025-06-16 NOTE — PROGRESS NOTES
Progress Note        New pt presents to establish care  Caregiver notes nocturnal enuresis - saw urology and was rx'd solifenacin without improvement  Has had water restriction prior to bedtime and multiple other interventions and nothing seems to help.  ·Constipation  Caregiver states that pt does not have regular BM's and when he does they are large and hard  Not taking any meds  Would like to discuss an appetite suppressant  Food is a huge trigger for the pt - has impulse disorder and he engages in impulsive eating  It's difficult to control access since he lives in a group home      Constipation         Subjective:     Patient's medications, allergies, past medical, surgical, social and family histories were reviewed and updated as appropriate.    Review of Systems   Gastrointestinal:  Positive for constipation.   All other systems reviewed and are negative.       Objective:     Vitals:    06/16/25 0926   BP: 125/87   Pulse: 85   Temp: 98.7 °F (37.1 °C)   SpO2: 98%   Weight: (!) 155 kg (341 lb 12.8 oz)   Height: 1.905 m (6' 3\")       Physical Exam  Vitals and nursing note reviewed.   Constitutional:       General: He is not in acute distress.     Appearance: Normal appearance. He is obese. He is not ill-appearing.   HENT:      Head: Normocephalic and atraumatic.      Right Ear: Tympanic membrane, ear canal and external ear normal. There is no impacted cerumen.      Left Ear: Tympanic membrane, ear canal and external ear normal. There is no impacted cerumen.      Nose: Nose normal.      Mouth/Throat:      Mouth: Mucous membranes are moist.      Pharynx: Oropharynx is clear.   Eyes:      Extraocular Movements: Extraocular movements intact.      Conjunctiva/sclera: Conjunctivae normal.      Pupils: Pupils are equal, round, and reactive to light.   Cardiovascular:      Rate and Rhythm: Normal rate and regular rhythm.      Pulses: Normal pulses.      Heart sounds: Normal heart sounds.   Pulmonary:      Effort:

## 2025-06-17 ENCOUNTER — RESULTS FOLLOW-UP (OUTPATIENT)
Facility: CLINIC | Age: 35
End: 2025-06-17

## 2025-06-17 LAB
ALBUMIN SERPL-MCNC: 4.3 G/DL (ref 4.1–5.1)
ALP SERPL-CCNC: 64 IU/L (ref 44–121)
ALT SERPL-CCNC: 23 IU/L (ref 0–44)
AST SERPL-CCNC: 22 IU/L (ref 0–40)
BASOPHILS # BLD AUTO: 0 X10E3/UL (ref 0–0.2)
BASOPHILS NFR BLD AUTO: 1 %
BILIRUB SERPL-MCNC: 0.2 MG/DL (ref 0–1.2)
BUN SERPL-MCNC: 14 MG/DL (ref 6–20)
BUN/CREAT SERPL: 18 (ref 9–20)
CALCIUM SERPL-MCNC: 9.5 MG/DL (ref 8.7–10.2)
CHLORIDE SERPL-SCNC: 109 MMOL/L (ref 96–106)
CHOLEST SERPL-MCNC: 177 MG/DL (ref 100–199)
CO2 SERPL-SCNC: 20 MMOL/L (ref 20–29)
CREAT SERPL-MCNC: 0.8 MG/DL (ref 0.76–1.27)
EGFRCR SERPLBLD CKD-EPI 2021: 118 ML/MIN/1.73
EOSINOPHIL # BLD AUTO: 0.1 X10E3/UL (ref 0–0.4)
EOSINOPHIL NFR BLD AUTO: 2 %
ERYTHROCYTE [DISTWIDTH] IN BLOOD BY AUTOMATED COUNT: 13.3 % (ref 11.6–15.4)
GLOBULIN SER CALC-MCNC: 3 G/DL (ref 1.5–4.5)
GLUCOSE SERPL-MCNC: 77 MG/DL (ref 70–99)
HBA1C MFR BLD: 5.1 % (ref 4.8–5.6)
HCT VFR BLD AUTO: 43.3 % (ref 37.5–51)
HDLC SERPL-MCNC: 40 MG/DL
HGB BLD-MCNC: 13.9 G/DL (ref 13–17.7)
IMM GRANULOCYTES # BLD AUTO: 0.1 X10E3/UL (ref 0–0.1)
IMM GRANULOCYTES NFR BLD AUTO: 2 %
IMP & REVIEW OF LAB RESULTS: NORMAL
LDLC SERPL CALC-MCNC: 116 MG/DL (ref 0–99)
LYMPHOCYTES # BLD AUTO: 2.8 X10E3/UL (ref 0.7–3.1)
LYMPHOCYTES NFR BLD AUTO: 43 %
MCH RBC QN AUTO: 29.9 PG (ref 26.6–33)
MCHC RBC AUTO-ENTMCNC: 32.1 G/DL (ref 31.5–35.7)
MCV RBC AUTO: 93 FL (ref 79–97)
MONOCYTES # BLD AUTO: 0.7 X10E3/UL (ref 0.1–0.9)
MONOCYTES NFR BLD AUTO: 11 %
NEUTROPHILS # BLD AUTO: 2.6 X10E3/UL (ref 1.4–7)
NEUTROPHILS NFR BLD AUTO: 41 %
PLATELET # BLD AUTO: 253 X10E3/UL (ref 150–450)
POTASSIUM SERPL-SCNC: 4.9 MMOL/L (ref 3.5–5.2)
PROT SERPL-MCNC: 7.3 G/DL (ref 6–8.5)
RBC # BLD AUTO: 4.65 X10E6/UL (ref 4.14–5.8)
SODIUM SERPL-SCNC: 142 MMOL/L (ref 134–144)
T4 FREE SERPL-MCNC: 1.16 NG/DL (ref 0.82–1.77)
TRIGL SERPL-MCNC: 117 MG/DL (ref 0–149)
TSH SERPL DL<=0.005 MIU/L-ACNC: 0.93 UIU/ML (ref 0.45–4.5)
VLDLC SERPL CALC-MCNC: 21 MG/DL (ref 5–40)
WBC # BLD AUTO: 6.4 X10E3/UL (ref 3.4–10.8)